# Patient Record
Sex: FEMALE | Race: WHITE | NOT HISPANIC OR LATINO | Employment: OTHER | URBAN - METROPOLITAN AREA
[De-identification: names, ages, dates, MRNs, and addresses within clinical notes are randomized per-mention and may not be internally consistent; named-entity substitution may affect disease eponyms.]

---

## 2020-03-02 ENCOUNTER — OFFICE VISIT (OUTPATIENT)
Dept: DERMATOLOGY | Facility: CLINIC | Age: 65
End: 2020-03-02
Payer: COMMERCIAL

## 2020-03-02 VITALS — WEIGHT: 159.38 LBS | TEMPERATURE: 98 F | BODY MASS INDEX: 29.33 KG/M2 | HEIGHT: 62 IN

## 2020-03-02 DIAGNOSIS — L57.0 ACTINIC KERATOSIS: Primary | ICD-10-CM

## 2020-03-02 DIAGNOSIS — L82.1 SEBORRHEIC KERATOSES: ICD-10-CM

## 2020-03-02 DIAGNOSIS — L29.9 PRURITUS: ICD-10-CM

## 2020-03-02 PROCEDURE — 99204 OFFICE O/P NEW MOD 45 MIN: CPT | Performed by: DERMATOLOGY

## 2020-03-02 PROCEDURE — 17000 DESTRUCT PREMALG LESION: CPT | Performed by: DERMATOLOGY

## 2020-03-02 RX ORDER — PANTOPRAZOLE SODIUM 40 MG/1
40 TABLET, DELAYED RELEASE ORAL DAILY
Status: ON HOLD | COMMUNITY
Start: 2020-02-10 | End: 2020-07-06 | Stop reason: SDUPTHER

## 2020-03-02 RX ORDER — LEVOTHYROXINE SODIUM 0.07 MG/1
75 TABLET ORAL DAILY
COMMUNITY
Start: 2019-11-15

## 2020-03-02 RX ORDER — BETAMETHASONE DIPROPIONATE 0.5 MG/G
CREAM TOPICAL 2 TIMES DAILY
Qty: 30 G | Refills: 2 | Status: SHIPPED | OUTPATIENT
Start: 2020-03-02 | End: 2020-08-17 | Stop reason: HOSPADM

## 2020-03-02 NOTE — PROGRESS NOTES
[facility] Note     Patient Name: Kerry Meyer  Encounter Date: 3/2/20    Today's Chief Concerns:  Yvonne Miller Concern #1:  Full body exam    Past Medical History:  Have you ever had or currently have any of the following medical conditions or treatments? · HIV/AIDS: No  · Hepatitis B: No  · Hepatitis C: No   · Diabetes: No  · Tuberculosis: No  · Biologic Therapy/Chemotherapy: No  · Organ or Bone Marrow Transplantation: No  · Radiation Treatment: No  · Cancer (If Yes, which types)- No      Have you ever had any of the following skin conditions? · Melanoma? (If Yes, please provide more detail)- No  · Basal Cell Carcinoma: No  · Squamous Cell Carcinoma: No  · Sebaceous Cell Carcinoma: No  · Merkel Cell Carcinoma: No  · Angiosarcoma: No  · Blistering Sunburns: YES  · Eczema: No  · Psoriasis: No    Social History:    What is your current Smoking Status? 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 Denies     Family history:  Do any of your "first degree relatives" (parent, brother, sister, or child) have any of the following conditions? · Melanoma? (If Yes, which relatives?) No  · Eczema: No  · Asthma: No  · Hay Fever/Seasonal Allergies: No  · Psoriasis: No  · Arthritis: YES  · Thyroid Problems: YES  · Lupus/Connective Tissue Disease: No  · Diabetes: No  · Stroke: YES  · Blood Clots: YES  · IBD/Crohn's/Ulcerative Colitis: No  · Vitiligo: No  · Scarring/Keloids: No  · Severe Acne: No  · Pancreatic Cancer: No  · Other known Skin Condition? If Yes, what condition and which relatives? No    Current Medications:    Current Outpatient Medications:     levothyroxine 75 mcg tablet, Take 75 mcg by mouth daily, Disp: , Rfl:     pantoprazole (PROTONIX) 40 mg tablet, Take 40 mg by mouth daily, Disp: , Rfl:     Specific Alerts:    Have you been seen by a [facility] Dermatologist in the last 3 years? No    Are you pregnant or planning to become pregnant? No    Are you currently or planning to be nursing or breast feeding? No    Allergies   Allergen Reactions    Sulfa Antibiotics Nausea Only    Medical Tape Rash       May we call your Preferred Phone number to discuss your specific medical information? YES    May we leave a detailed message that includes your specific medical information? YES    Have you traveled outside of the [facility] in the past 3 months? No    Do you currently have a pacemaker or defibrillator? No    Do you have any artificial heart valves, joints, plates, screws, rods, stents, pins, etc? No   - If Yes, were any placed within the last 2 years? Do you require any medications prior to a surgical procedure? No   - If Yes, for which procedure? n/a   - If Yes, what medications to you require? n/a    Are you taking any medications that cause you to bleed more easily ("blood thinners") No    Have you ever experienced a rapid heartbeat with epinephrine? No    Have you ever been treated with "gold" (gold sodium thiomalate) therapy? No    [facility] can help with wrinkles, "laugh lines," facial volume loss, "double chin," "love handles," age spots, and more  Are you interested in learning today about some of the skin enhancement procedures that we offer? (If Yes, please provide more detail) No    Review of Systems:  Have you recently had or currently have any of the following?     · Fever or chills: No  · Night Sweats: No  · Headaches: No  · Weight Gain: No  · Weight Loss: No  · Blurry Vision: No  · Nausea: No  · Vomiting: No  · Diarrhea: No  · Blood in Stool: No  · Abdominal Pain: No  · Itchy Skin: YES  · Painful Joints: No  · Swollen Joints: No  · Muscle Pain: No  · Irregular Mole: No  · Sun Burn: No  · Dry Skin: YES  · Skin Color Changes: No  · Scar or Keloid: No  · Cold Sores/Fever Blisters: YES  · Bacterial Infections/MRSA: No  · Anxiety: No  · Depression: No  · Suicidal or Homicidal Thoughts: No      PHYSICAL EXAM: Was a chaperone (Derm Clinical Assistant) present for the entirety of the Physical Exam? YES    Did the Dermatology Team specifically ask and  the patient on the importance of a Full Skin Exam to be sure that nothing is missed clinically? YES    Did the patient request or accept a Full Skin Exam?  YES    Did the patient specifically refuse to have the areas "under-the-bra" examined by the Dermatologist? No    Did the patient specifically refuse to have the areas "under-the-underwear" examined by the Dermatologist? No      CONSTITUTIONAL:   Vitals:    03/02/20 1253   Temp: 98 °F (36 7 °C)   TempSrc: Tympanic   Weight: 72 3 kg (159 lb 6 oz)   Height: 5' 2" (1 575 m)       PSYCH: Normal mood and affect  EYES: Normal conjunctiva  ENT: Normal lips and oral mucosa  CARDIOVASCULAR: No edema  RESPIRATORY: Normal respirations  HEME/LYMPH/IMMUNO:  No regional lymphadenopathy except as noted below in ASSESSMENT AND PLAN BY DIAGNOSIS    FULL ORGAN SYSTEM SKIN EXAM (SKIN)  Hair, Scalp, Ears, Face Normal except as noted below in Assessment   Neck, Cervical Chain Nodes Normal except as noted below in Assessment   Right Arm/Hand/Fingers Normal except as noted below in Assessment   Left Arm/Hand/Fingers Normal except as noted below in Assessment   Chest/Breasts/Axillae Viewed areas Normal except as noted below in Assessment   Abdomen, Umbilicus Normal except as noted below in Assessment   Back/Spine Normal except as noted below in Assessment   Groin/Genitalia/Buttocks Viewed areas Normal except as noted below in Assessment   Right Leg, Foot, Toes Normal except as noted below in Assessment   Left Leg, Foot, Toes Normal except as noted below in Assessment        ASSESSMENT AND PLAN BY DIAGNOSIS:    History of Present Condition:     Duration:  How long has this been an issue for you?    o  Years    Location Affected:  Where on the body is this affecting you?     o  trunk and extremities    Quality:  Is there any bleeding, pain, itch, burning/irritation, or redness associated with the skin lesion? o  denies    Severity:  Describe any bleeding, pain, itch, burning/irritation, or redness on a scale of 1 to 10 (with 10 being the worst)  o  denies    Timing:  Does this condition seem to be there pretty constantly or do you notice it more at specific times throughout the day?    o  denies    Context:  Have you ever noticed that this condition seems to be associated with specific activities you do?    o  denies    Modifying Factors:    o Anything that seems to make the condition worse?    -  denies   o What have you tried to do to make the condition better?    -  denies    Associated Signs and Symptoms:  Does this skin lesion seem to be associated with any of the following:  o  denies     1  ACTINIC KERATOSIS    Physical Exam:   Anatomic Location Affected:  Right upper lip   Morphological Description:  Red scaly papule     Assessment and Plan:  Based on a thorough discussion of this condition and the management approach to it (including a comprehensive discussion of the known risks, side effects and potential benefits of treatment), the patient (family) agrees to implement the following specific plan:     Reassure benign    Liquid nitrogen was applied for 10-12 seconds to the skin lesion and the expected blistering or scabbing reaction explained  Do not pick at the area  Patient reminded to expect hypopigmented scars from the procedure  Return if lesion fails to fully resolve  Actinic keratoses are very common on sites repeatedly exposed to the sun, especially the backs of the hands and the face, most often affecting the ears, nose, cheeks, upper lip, vermilion of the lower lip, temples, forehead and balding scalp  In severely chronically sun-damaged individuals, they may also be found on the upper trunk, upper and lower limbs, and dorsum of feet      We discussed the theoretical premalignant (pre-cancerous) nature and etiology of these growths  We discussed the prevailing notion that actinic keratoses are a reflection of abnormal skin cell development due to DNA damage by short wavelength UVB  They are more likely to appear if the immune function is poor, due to aging, recent sun exposure, predisposing disease or certain drugs  We discussed that the main concern is that actinic keratoses may predispose to squamous cell carcinoma  It is rare for a solitary actinic keratosis to evolve to squamous cell carcinoma (SCC), but the risk of SCC occurring at some stage in a patient with more than 10 actinic keratoses is thought to be about 10 to 15%  A tender, thickened, ulcerated or enlarging actinic keratosis is suspicious of SCC  Actinic keratoses may be prevented by strict sun protection  If already present, keratoses may improve with a very high sun protection factor (50+) broad-spectrum sunscreen applied at least daily to affected areas, year-round  We recommend that UPF-rated clothing and hats and sunglasses be worn whenever possible and that a sunscreen-moisturizer combination product such as Neutrogena Daily Defense be applied at least three times a day  We performed a thorough discussion of treatment options and specific risk/benefits/alternatives including but not limited to medical field treatment with medications such as the following:      Cryotherapy (specifically, local pain, scarring, dyspigmentation, blistering, possible superinfection, and treats only what we see versus directed treatment today)  PROCEDURE:  DESTRUCTION OF PRE-MALIGNANT LESIONS  After a thorough discussion of treatment options and risk/benefits/alternatives (including but not limited to local pain, scarring, dyspigmentation, blistering, and possible superinfection), verbal and written consent were obtained and the aforementioned lesions were treated on with cryotherapy using liquid nitrogen x 1 cycle for 5-10 seconds       TOTAL NUMBER of 1 pre-malignant lesions were treated today on the ANATOMIC LOCATION: right upper lip  The patient tolerated the procedure well, and after-care instructions were provided  2  PRURITUS    Physical Exam:   Anatomic Location Affected:  Right and left shoulder    Morphological Description:  Crusted erosions    Pertinent Positives:   Pertinent Negatives: no lymphadenopathy     Assessment and Plan:  Based on a thorough discussion of this condition and the management approach to it (including a comprehensive discussion of the known risks, side effects and potential benefits of treatment), the patient (family) agrees to implement the following specific plan:   Claritin or Allegra once daily    Start Betamethasone 0 05% cream, apply topically twice a day    What is pruritus? Pruritus is the medical term for itch  Itch is an unpleasant sensation on the skin that provokes the desire to rub or scratch the area to obtain relief  Itch can cause discomfort and frustration; in severe cases it can lead to disturbed sleep, anxiety and depression  Constant scratching to obtain relief can damage the skin (excoriation, lichenification) and reduce its effectiveness as a major protective barrier  Pruritus is often a symptom of an underlying disease process such as a skin problem, a systemic disease, or abnormal nerve impulses  What are skin signs of pruritus? There are no specific skin signs associated with pruritus, apart from scratch marks (excoriations) and signs of the underlying condition  Persistent scratching over a period of time may lead to:   Lichenification (thickened skin, lichen simplex)    Prurigo papules and nodules  Who gets pruritus? The epidemiology of pruritus depends on its underlying cause or causes  However, in general, the incidence of chronic pruritus increases with age, it is more common in women, and in those of  background       Mechanisms underlying pruritus  Itch, like pain, can originate anywhere along the neural itch pathway, from the central nervous system (brain and spinal cord) to the peripheral nervous system and the skin  Mechanisms underlying pruritus are complex   The itch signal is transmitted mainly through small, itch-selective C-fibers in the skin in addition to histamine-triggered and non-histaminergic neurons   These connect with secondary neurons which cross the opposite side of the spinothalamic tract and ascend to parts of the brain involved in sensation, emotion, reward and memory  These areas overlap with those activated by pain   Patients with chronic pruritus usually have both peripheral and central hypersensitization (heightened reaction) which means they tend to overreact to noxious stimuli which normally inhibit itch (such as heat and scratching) and also misinterpret non-noxious stimuli as an itch (eg, light touch)    The way scratching stops itching has been explained by an interaction with pain pathways within the dorsal horn of the spinal cord  Localized pruritus  Localized pruritus is pruritus that is confined to a certain part of the body  It can occur in association with a primary rash (eg dermatitis) or may occur because of hypersensitive nerves in the skin (neuropathic pruritus)  Neuropathic pruritus is due to compression or degeneration of nerves in the skin, on route to the spine or in the spine itself  Neuropathic itch is sometimes associated with reduced or absent sweating in the affected area of skin    Typical causes of localized itchy rashes   Scalp: seborrhoeic dermatitis, head lice    Back: [place] disease    Hands: pompholyx, irritant and/or allergic contact dermatitis    Genitals: vulvovaginal Candida albicans infection, lichen sclerosus    Legs: venous eczema    Feet: tinea pedis    Neuropathic causes of localized pruritus without primary rash   Face: trigeminal trophic syndrome    Hand: cheiralgia paraesthetica  Arm: brachioradial pruritus    Back: notalgia paraesthetica/topics/brachioradial-pruritus/    Genital: pruritus vulvae, pruritus ani    Dermatomal: herpes zoster (shingles) during recovery phase  Scratching a localised itch may lead to lichen simplex, prurigo or prurigo nodularis  Systemic causes of pruritus  Sytemic diseases may cause generalised pruritus  This is sometimes called metabolic itch  There is nothing wrong with the skin itself, at least until it's been scratched  Metabolic disorders include chronic renal failure (dialysis) and liver disease (with or without cholestasis)  Uraemic pruritus arises in patients undergoing dialysis is due to a combination of xerosis (dry skin), secondary hyperparathyroidism, peripheral neuropathy (nerve changes) and inflammation   Secondary hyperparathyroidism which also occurs in dialysis patients leads to microprecipitation (deposition) of calcium and magnesium salts in the skin, triggering mast cell degeneration, releasing serotonin and histamine   Once chronic pruritus has occurred, there may be secondary changes in the nerves in the skin and central nervous system which heighten the sensation of itch  Hepatogenic pruritus is more common in intrahepatic than extrahepatic cholestasis  Examples of intrahepatic cholestasis is associated with chronic viral hepatitis, primary biliary cirrhosis, pregnancy-related cholestasis  Extra-hepatic cholestasis is associated with pressure on the bile ducts, eg from pancreatic tumours or pseudocysts   Cholestasis is thought to release toxic substances from the liver, which stimulates neural itch fibres in the skin   Characteristically, cholestatic pruritus is most severe at night; it tends to affect the hands, feet and areas where clothes are rubbing on the skin  Haematological disorders include iron deficiency anaemia and polycythaemia vera     Generalized pruritus along with glossitis (tongue inflammation) and angular cheilitis (inflammation of mouth corners) are seen in iron deficiency anaemia   In polycythaemia vera, itch is usually precipitated by contact with water (aquagenic pruritus), eg after a shower  This is thought to be mediated by the effect of platelets, serotonin and prostaglandins  Endocrine disorders include thyroid disease and diabetes mellitus   In Graves' disease (thyrotoxicosis), increased blood flow, skin temperature and decreased itch threshold mediated by the increase in thyroid hormones, lead to the itch  Pruritus associated with myxoedema and hypothyroidism is rare, and if present, is more likely the result of xerosis (dry skin)   In diabetes mellitus, localized itch tends to occur in the perianal/genital region usually due to Candida albicans or dermatophyte infections  It is unclear if metabolic abnormalities such as renal impairment, autonomic failure or diabetic neuropathy contribute to this  Paraneoplastic itch is associated with lymphoma, especially Hodgkin lymphoma, leukemia or a solid organ tumor (eg lung, colon, brain)   In Hodgkin lymphoma, pruritus is thought to be caused by histamine release, which may be related to eosinophilia  Infections causing itch include human immunodeficiency virus infection (HIV) and hepatitis C virus   Patients with HIV commonly complain of itch  This may be associated with skin infections/infestations, dry skin, drug reactions, hyperoesinophilia (increased eosinophil levels) and cutaneous T cell lymphoma  There is a possible correlation between intractable pruritus and increased HIV viral load   In chronic hepatitis C infection, the mechanisms responsible for itch remain unclear  In the absence of cholestasis, pruritus may be related to antiviral therapy; it has been noted to occur in patients treated with combination therapy (interferon lianet and ribavirin)      Pruritic skin diseases  Pruritus is often a symptom of many skin diseases  Some of these are included in the following list    Allergic contact dermatitis    Dry skin    Urticaria    Psoriasis    Atopic dermatitis    Folliculitis    Dermatitis herpetiformis    Lichen simplex    Lichen planus    Bullous pemphigoid    Lice    Scabies    Miliaria    Sunburn    Pityriasis rosea    Mycosis fungoides    Exposure-related pruritus  Pruritus may arise as a result of exposure to certain external factors   Allergens or irritants    Cold, which can cause 'winter itch'    A physical urticaria, such as dermographism    Aquagenic pruritus (itch on exposure to water)    Insects and infestations, eg scabies    Medications (topical or systemic) eg opioids, aspirin    Hormonal reasons for pruritus  About 2% of pregnant women have pruritus without any obvious dermatological cause  In some cases the itch is due to cholestasis (pooling of bile in the gall bladder and liver)  It usually occurs in the 3rd trimester and is relieved after giving birth  Generalized itch is also a common symptom of menopause  How is pruritus diagnosed? The first steps of evaluation of an itchy patient are medical history and examination  A thorough history can identify constitutional symptoms that may point towards an underlying systemic disease  Drug triggers such as opioids may be identified, especially if the commencement of the drug relates to the itch  A careful examination can identify dermatological causes for the itch (eg scabies, lichen simplex, pemphigoid) or evidence of chronic skin changes related to the itch  In dermatological causes of pruritus, primary skin lesions will usually suggest the diagnosis  Patients without primary skin lesions and little evidence of chronic scratching should be investigated for systemic, neuropathic and psychogenic causes    The panel of investigations could include:   Full/complete blood count    Creatinine and renal function tests    Liver function tests    Thyroid function tests    Erythrocyte sedimentation rate    Chest radiography    HIV serology    What treatment is available for itch? The management of pruritus relies on establishing the cause and then either removing or treating the cause to prevent further itching  In many cases, tests are necessary to determine the cause; while these are in progress, treatment to provide symptomatic relief of pruritus may be given  Topical treatments  In addition to specific therapy for any underlying skin or internal disease, topical treatment may include:   Wet dressings or tepid shower to cool the skin    Calamine lotion (contains phenol, which cools the skin): avoid on dry skin and limit use to a few days    Menthol/camphor lotion: gives a chilling sensation    Local anesthetics, such as pramoxine (also called pramocaine), applied to small itchy spots such as insect bites    Regular use of emollients, especially if skin is dry    Mild topical corticosteroids for short periods    Topical calcineurin inhibitors are also used to reduce itch associated with inflammatory skin conditions    Topical doxepin, a tricyclic antidepressant and antihistamine, is an antipruritic used in eczema  Other measures that can be useful in preventing pruritus include avoiding precipitating factors such as rough clothing or fabrics, overheating, and vasodilators if they provoke itching (eg, caffeine, alcohol, spices)  Fingernails should be kept short and clean  If the urge to scratch is irresistible then rub the area with your palm  Topical antihistamines should not be used for chronic itch, as they may sensitize the skin and result in allergic contact dermatitis  Systemic therapy  If pruritus is severe and sleep is disturbed, then treatment with oral medication may be necessary  Some drugs may help to relieve the itch whilst others are given solely for their sedative effects     Antihistamines are most useful in urticaria, in which histamine is released  Use for other pruritic conditions is not supported by randomized control trials  Sedating antihistamines may be used for their sedative effects   Doxepin and amitriptyline are tricyclic antidepressants have antipruritic action and act on the central and peripheral nervous systems   Tetracyclic antidepressants such as mirtazepine and selective serotonin reuptake inhibitors (paroxetine, sertraline, fluoxetine) may also help some patients with severe itch including when it is caused by cholestasis, T-cell lymphoma, malignancy or a neuropathic condition   Anti-epileptic drugs such as sodium valproate, gabapentin and pregabalin may also be of benefit to some patients, e g , those with itch associated with renal failure or neuropathic itch  The mechanism of action is uncertain   Opioid antagonists such as butorphanol intranasal spray, naltrexone tablets, and naloxone have been effective in patients suffering from intractable pruritus in association with liver disease, atopic eczema and chronic urticaria  Nalfurafine, which is a kappa-opioid agonist has also been studied and shown to reduce itch associated with chronic renal impairment, however it is not widely available   Aspirin is sometimes effective if pruritus is mediated by kinins or prostaglandins and is noted to be effective in patients with pruritus due to polycythaemia vera  Note: aspirin may cause or aggravate itch in some patients   Thalidomide has been successful in treating nodular prurigo and chronic pruritus of various kinds but is rarely used because of serious adverse effects and expense   Rifampicin is effective for patients with pruritus associated with cholestasis (some forms of liver disease)       Isolated case reports in severe itch associated with malignancy have reported success with the NKR1 antagonist, aprepitant (normally used short-term for postoperative or chemotherapy-induced nausea)  This is under investigation for neuropathic itch and nodular prurigo  Phototherapy  Broadband ultraviolet B or narrow-band UVB phototherapy alone, or in conjunction with UVA, has been shown to be helpful for pruritus associated with chronic kidney disease, psoriasis, atopic eczema and cutaneous T-cell lymphoma  Behavioral therapy  Behavioral therapy may be used in conjunction with pharmacotherapy to modify behaviours such as coping mechanisms and stress reduction, which help interrupt the itch-scratch cycle  One randomized controlled trial showed short-term benefits in reduction in itch frequency and scratching as well as improvement in coping mechanisms  What is the outcome for pruritus? The management of chronic severe itch is difficult and often requires the use of combination therapy over a long period of time  Identification and treatment of underlying conditions causing pruritus may help in this process  The symptom may quickly disappear or persist for long periods of time  3  SEBORRHEIC KERATOSIS; NON-INFLAMED    Physical Exam:   Anatomic Location Affected:  Right nipple    Morphological Description:  scaly plaque   Pertinent Positives:   Pertinent Negatives: no lymphadenopathy     Additional History of Present Condition:  Patient reports new bumps on the skin  Denies itch, burn, pain, bleeding or ulceration  Present constantly; nothing seems to make it worse or better  No prior treatment  Assessment and Plan:  Based on a thorough discussion of this condition and the management approach to it (including a comprehensive discussion of the known risks, side effects and potential benefits of treatment), the patient (family) agrees to implement the following specific plan:   Reassure benign     Seborrheic Keratosis  A seborrheic keratosis is a harmless warty spot that appears during adult life as a common sign of skin aging    Seborrheic keratoses can arise on any area of skin, covered or uncovered, with the usual exception of the palms and soles  They do not arise from mucous membranes  Seborrheic keratoses can have highly variable appearance  Seborrheic keratoses are extremely common  It has been estimated that over 90% of adults over the age of 61 years have one or more of them  They occur in males and females of all races, typically beginning to erupt in the 35s or 45s  They are uncommon under the age of 21 years  The precise cause of seborrhoeic keratoses is not known  Seborrhoeic keratoses are considered degenerative in nature  As time goes by, seborrheic keratoses tend to become more numerous  Some people inherit a tendency to develop a very large number of them; some people may have hundreds of them  The name "seborrheic keratosis" is misleading, because these lesions are not limited to a seborrhoeic distribution (scalp, mid-face, chest, upper back), nor are they formed from sebaceous glands, nor are they associated with sebum -- which is greasy  Seborrheic keratosis may also be called "SK," "Seb K," "basal cell papilloma," "senile wart," or "barnacle "      Researchers have noted:   Eruptive seborrhoeic keratoses can follow sunburn or dermatitis   Skin friction may be the reason they appear in body folds   Viral cause (e g , human papillomavirus) seems unlikely   Stable and clonal mutations or activation of FRFR3, PIK3CA, ABBY, AKT1 and EGFR genes are found in seborrhoeic keratoses   Seborrhoeic keratosis can arise from solar lentigo   FRFR3 mutations also arise in solar lentigines  These mutations are associated with increased age and location on the head and neck, suggesting a role of ultraviolet radiation in these lesions   Seborrheic keratoses do not harbour tumour suppressor gene mutations   Epidermal growth factor receptor inhibitors, which are used to treat some cancers, often result in an increase in verrucal (warty) keratoses      There is no easy way to remove multiple lesions on a single occasion  Unless a specific lesion is "inflamed" and is causing pain or stinging/burning or is bleeding, most insurance companies do not authorize treatment  Physician synopsis:    Pt with fam hx of SCC (daughter) and  1  AK L upper lip tx with LN2 today  2  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

## 2020-03-02 NOTE — PATIENT INSTRUCTIONS
Assessment and Plan:  Based on a thorough discussion of this condition and the management approach to it (including a comprehensive discussion of the known risks, side effects and potential benefits of treatment), the patient (family) agrees to implement the following specific plan:     Reassure benign    Liquid nitrogen was applied for 10-12 seconds to the skin lesion and the expected blistering or scabbing reaction explained  Do not pick at the area  Patient reminded to expect hypopigmented scars from the procedure  Return if lesion fails to fully resolve  Actinic keratoses are very common on sites repeatedly exposed to the sun, especially the backs of the hands and the face, most often affecting the ears, nose, cheeks, upper lip, vermilion of the lower lip, temples, forehead and balding scalp  In severely chronically sun-damaged individuals, they may also be found on the upper trunk, upper and lower limbs, and dorsum of feet  We discussed the theoretical premalignant (pre-cancerous) nature and etiology of these growths  We discussed the prevailing notion that actinic keratoses are a reflection of abnormal skin cell development due to DNA damage by short wavelength UVB  They are more likely to appear if the immune function is poor, due to aging, recent sun exposure, predisposing disease or certain drugs  We discussed that the main concern is that actinic keratoses may predispose to squamous cell carcinoma  It is rare for a solitary actinic keratosis to evolve to squamous cell carcinoma (SCC), but the risk of SCC occurring at some stage in a patient with more than 10 actinic keratoses is thought to be about 10 to 15%  A tender, thickened, ulcerated or enlarging actinic keratosis is suspicious of SCC  Actinic keratoses may be prevented by strict sun protection   If already present, keratoses may improve with a very high sun protection factor (50+) broad-spectrum sunscreen applied at least daily to affected areas, year-round  We recommend that UPF-rated clothing and hats and sunglasses be worn whenever possible and that a sunscreen-moisturizer combination product such as Neutrogena Daily Defense be applied at least three times a day  We performed a thorough discussion of treatment options and specific risk/benefits/alternatives including but not limited to medical field treatment with medications such as the following:      Cryotherapy (specifically, local pain, scarring, dyspigmentation, blistering, possible superinfection, and treats only what we see versus directed treatment today)  Assessment and Plan:  Based on a thorough discussion of this condition and the management approach to it (including a comprehensive discussion of the known risks, side effects and potential benefits of treatment), the patient (family) agrees to implement the following specific plan:   Claritin or Allegra once daily    Start Betamethasone 0 05% cream, apply topically twice a day    What is pruritus? Pruritus is the medical term for itch  Itch is an unpleasant sensation on the skin that provokes the desire to rub or scratch the area to obtain relief  Itch can cause discomfort and frustration; in severe cases it can lead to disturbed sleep, anxiety and depression  Constant scratching to obtain relief can damage the skin (excoriation, lichenification) and reduce its effectiveness as a major protective barrier  Pruritus is often a symptom of an underlying disease process such as a skin problem, a systemic disease, or abnormal nerve impulses  What are skin signs of pruritus? There are no specific skin signs associated with pruritus, apart from scratch marks (excoriations) and signs of the underlying condition  Persistent scratching over a period of time may lead to:   Lichenification (thickened skin, lichen simplex)    Prurigo papules and nodules  Who gets pruritus?   The epidemiology of pruritus depends on its underlying cause or causes  However, in general, the incidence of chronic pruritus increases with age, it is more common in women, and in those of  background  Mechanisms underlying pruritus  Itch, like pain, can originate anywhere along the neural itch pathway, from the central nervous system (brain and spinal cord) to the peripheral nervous system and the skin  Mechanisms underlying pruritus are complex   The itch signal is transmitted mainly through small, itch-selective C-fibers in the skin in addition to histamine-triggered and non-histaminergic neurons   These connect with secondary neurons which cross the opposite side of the spinothalamic tract and ascend to parts of the brain involved in sensation, emotion, reward and memory  These areas overlap with those activated by pain   Patients with chronic pruritus usually have both peripheral and central hypersensitization (heightened reaction) which means they tend to overreact to noxious stimuli which normally inhibit itch (such as heat and scratching) and also misinterpret non-noxious stimuli as an itch (eg, light touch)    The way scratching stops itching has been explained by an interaction with pain pathways within the dorsal horn of the spinal cord  Localized pruritus  Localized pruritus is pruritus that is confined to a certain part of the body  It can occur in association with a primary rash (eg dermatitis) or may occur because of hypersensitive nerves in the skin (neuropathic pruritus)  Neuropathic pruritus is due to compression or degeneration of nerves in the skin, on route to the spine or in the spine itself  Neuropathic itch is sometimes associated with reduced or absent sweating in the affected area of skin    Typical causes of localized itchy rashes   Scalp: seborrhoeic dermatitis, head lice    Back: Zachary disease    Hands: pompholyx, irritant and/or allergic contact dermatitis    Genitals: vulvovaginal Candida albicans infection, lichen sclerosus    Legs: venous eczema    Feet: tinea pedis    Neuropathic causes of localized pruritus without primary rash   Face: trigeminal trophic syndrome    Hand: cheiralgia paraesthetica    Arm: brachioradial pruritus    Back: notalgia paraesthetica/topics/brachioradial-pruritus/    Genital: pruritus vulvae, pruritus ani    Dermatomal: herpes zoster (shingles) during recovery phase  Scratching a localised itch may lead to lichen simplex, prurigo or prurigo nodularis  Systemic causes of pruritus  Sytemic diseases may cause generalised pruritus  This is sometimes called metabolic itch  There is nothing wrong with the skin itself, at least until it's been scratched  Metabolic disorders include chronic renal failure (dialysis) and liver disease (with or without cholestasis)  Uraemic pruritus arises in patients undergoing dialysis is due to a combination of xerosis (dry skin), secondary hyperparathyroidism, peripheral neuropathy (nerve changes) and inflammation   Secondary hyperparathyroidism which also occurs in dialysis patients leads to microprecipitation (deposition) of calcium and magnesium salts in the skin, triggering mast cell degeneration, releasing serotonin and histamine   Once chronic pruritus has occurred, there may be secondary changes in the nerves in the skin and central nervous system which heighten the sensation of itch  Hepatogenic pruritus is more common in intrahepatic than extrahepatic cholestasis  Examples of intrahepatic cholestasis is associated with chronic viral hepatitis, primary biliary cirrhosis, pregnancy-related cholestasis  Extra-hepatic cholestasis is associated with pressure on the bile ducts, eg from pancreatic tumours or pseudocysts   Cholestasis is thought to release toxic substances from the liver, which stimulates neural itch fibres in the skin      Characteristically, cholestatic pruritus is most severe at night; it tends to affect the hands, feet and areas where clothes are rubbing on the skin  Haematological disorders include iron deficiency anaemia and polycythaemia vera   Generalized pruritus along with glossitis (tongue inflammation) and angular cheilitis (inflammation of mouth corners) are seen in iron deficiency anaemia   In polycythaemia vera, itch is usually precipitated by contact with water (aquagenic pruritus), eg after a shower  This is thought to be mediated by the effect of platelets, serotonin and prostaglandins  Endocrine disorders include thyroid disease and diabetes mellitus   In Graves' disease (thyrotoxicosis), increased blood flow, skin temperature and decreased itch threshold mediated by the increase in thyroid hormones, lead to the itch  Pruritus associated with myxoedema and hypothyroidism is rare, and if present, is more likely the result of xerosis (dry skin)   In diabetes mellitus, localized itch tends to occur in the perianal/genital region usually due to Candida albicans or dermatophyte infections  It is unclear if metabolic abnormalities such as renal impairment, autonomic failure or diabetic neuropathy contribute to this  Paraneoplastic itch is associated with lymphoma, especially Hodgkin lymphoma, leukemia or a solid organ tumor (eg lung, colon, brain)   In Hodgkin lymphoma, pruritus is thought to be caused by histamine release, which may be related to eosinophilia  Infections causing itch include human immunodeficiency virus infection (HIV) and hepatitis C virus   Patients with HIV commonly complain of itch  This may be associated with skin infections/infestations, dry skin, drug reactions, hyperoesinophilia (increased eosinophil levels) and cutaneous T cell lymphoma  There is a possible correlation between intractable pruritus and increased HIV viral load   In chronic hepatitis C infection, the mechanisms responsible for itch remain unclear   In the absence of cholestasis, pruritus may be related to antiviral therapy; it has been noted to occur in patients treated with combination therapy (interferon lianet and ribavirin)  Pruritic skin diseases  Pruritus is often a symptom of many skin diseases  Some of these are included in the following list    Allergic contact dermatitis    Dry skin    Urticaria    Psoriasis    Atopic dermatitis    Folliculitis    Dermatitis herpetiformis    Lichen simplex    Lichen planus    Bullous pemphigoid    Lice    Scabies    Miliaria    Sunburn    Pityriasis rosea    Mycosis fungoides    Exposure-related pruritus  Pruritus may arise as a result of exposure to certain external factors   Allergens or irritants    Cold, which can cause 'winter itch'    A physical urticaria, such as dermographism    Aquagenic pruritus (itch on exposure to water)    Insects and infestations, eg scabies    Medications (topical or systemic) eg opioids, aspirin    Hormonal reasons for pruritus  About 2% of pregnant women have pruritus without any obvious dermatological cause  In some cases the itch is due to cholestasis (pooling of bile in the gall bladder and liver)  It usually occurs in the 3rd trimester and is relieved after giving birth  Generalized itch is also a common symptom of menopause  How is pruritus diagnosed? The first steps of evaluation of an itchy patient are medical history and examination  A thorough history can identify constitutional symptoms that may point towards an underlying systemic disease  Drug triggers such as opioids may be identified, especially if the commencement of the drug relates to the itch  A careful examination can identify dermatological causes for the itch (eg scabies, lichen simplex, pemphigoid) or evidence of chronic skin changes related to the itch  In dermatological causes of pruritus, primary skin lesions will usually suggest the diagnosis   Patients without primary skin lesions and little evidence of chronic scratching should be investigated for systemic, neuropathic and psychogenic causes  The panel of investigations could include:   Full/complete blood count    Creatinine and renal function tests    Liver function tests    Thyroid function tests    Erythrocyte sedimentation rate    Chest radiography    HIV serology    What treatment is available for itch? The management of pruritus relies on establishing the cause and then either removing or treating the cause to prevent further itching  In many cases, tests are necessary to determine the cause; while these are in progress, treatment to provide symptomatic relief of pruritus may be given  Topical treatments  In addition to specific therapy for any underlying skin or internal disease, topical treatment may include:   Wet dressings or tepid shower to cool the skin    Calamine lotion (contains phenol, which cools the skin): avoid on dry skin and limit use to a few days    Menthol/camphor lotion: gives a chilling sensation    Local anesthetics, such as pramoxine (also called pramocaine), applied to small itchy spots such as insect bites    Regular use of emollients, especially if skin is dry    Mild topical corticosteroids for short periods    Topical calcineurin inhibitors are also used to reduce itch associated with inflammatory skin conditions    Topical doxepin, a tricyclic antidepressant and antihistamine, is an antipruritic used in eczema  Other measures that can be useful in preventing pruritus include avoiding precipitating factors such as rough clothing or fabrics, overheating, and vasodilators if they provoke itching (eg, caffeine, alcohol, spices)  Fingernails should be kept short and clean  If the urge to scratch is irresistible then rub the area with your palm  Topical antihistamines should not be used for chronic itch, as they may sensitize the skin and result in allergic contact dermatitis      Systemic therapy  If pruritus is severe and sleep is disturbed, then treatment with oral medication may be necessary  Some drugs may help to relieve the itch whilst others are given solely for their sedative effects   Antihistamines are most useful in urticaria, in which histamine is released  Use for other pruritic conditions is not supported by randomized control trials  Sedating antihistamines may be used for their sedative effects   Doxepin and amitriptyline are tricyclic antidepressants have antipruritic action and act on the central and peripheral nervous systems   Tetracyclic antidepressants such as mirtazepine and selective serotonin reuptake inhibitors (paroxetine, sertraline, fluoxetine) may also help some patients with severe itch including when it is caused by cholestasis, T-cell lymphoma, malignancy or a neuropathic condition   Anti-epileptic drugs such as sodium valproate, gabapentin and pregabalin may also be of benefit to some patients, e g , those with itch associated with renal failure or neuropathic itch  The mechanism of action is uncertain   Opioid antagonists such as butorphanol intranasal spray, naltrexone tablets, and naloxone have been effective in patients suffering from intractable pruritus in association with liver disease, atopic eczema and chronic urticaria  Nalfurafine, which is a kappa-opioid agonist has also been studied and shown to reduce itch associated with chronic renal impairment, however it is not widely available   Aspirin is sometimes effective if pruritus is mediated by kinins or prostaglandins and is noted to be effective in patients with pruritus due to polycythaemia vera  Note: aspirin may cause or aggravate itch in some patients   Thalidomide has been successful in treating nodular prurigo and chronic pruritus of various kinds but is rarely used because of serious adverse effects and expense      Rifampicin is effective for patients with pruritus associated with cholestasis (some forms of liver disease)  Isolated case reports in severe itch associated with malignancy have reported success with the NKR1 antagonist, aprepitant (normally used short-term for postoperative or chemotherapy-induced nausea)  This is under investigation for neuropathic itch and nodular prurigo  Phototherapy  Broadband ultraviolet B or narrow-band UVB phototherapy alone, or in conjunction with UVA, has been shown to be helpful for pruritus associated with chronic kidney disease, psoriasis, atopic eczema and cutaneous T-cell lymphoma  Behavioral therapy  Behavioral therapy may be used in conjunction with pharmacotherapy to modify behaviours such as coping mechanisms and stress reduction, which help interrupt the itch-scratch cycle  One randomized controlled trial showed short-term benefits in reduction in itch frequency and scratching as well as improvement in coping mechanisms  What is the outcome for pruritus? The management of chronic severe itch is difficult and often requires the use of combination therapy over a long period of time  Identification and treatment of underlying conditions causing pruritus may help in this process  The symptom may quickly disappear or persist for long periods of time  Assessment and Plan:  Based on a thorough discussion of this condition and the management approach to it (including a comprehensive discussion of the known risks, side effects and potential benefits of treatment), the patient (family) agrees to implement the following specific plan:   Reassure benign     Seborrheic Keratosis  A seborrheic keratosis is a harmless warty spot that appears during adult life as a common sign of skin aging  Seborrheic keratoses can arise on any area of skin, covered or uncovered, with the usual exception of the palms and soles  They do not arise from mucous membranes  Seborrheic keratoses can have highly variable appearance  Seborrheic keratoses are extremely common   It has been estimated that over 90% of adults over the age of 61 years have one or more of them  They occur in males and females of all races, typically beginning to erupt in the 35s or 45s  They are uncommon under the age of 21 years  The precise cause of seborrhoeic keratoses is not known  Seborrhoeic keratoses are considered degenerative in nature  As time goes by, seborrheic keratoses tend to become more numerous  Some people inherit a tendency to develop a very large number of them; some people may have hundreds of them  The name "seborrheic keratosis" is misleading, because these lesions are not limited to a seborrhoeic distribution (scalp, mid-face, chest, upper back), nor are they formed from sebaceous glands, nor are they associated with sebum -- which is greasy  Seborrheic keratosis may also be called "SK," "Seb K," "basal cell papilloma," "senile wart," or "barnacle "      Researchers have noted:   Eruptive seborrhoeic keratoses can follow sunburn or dermatitis   Skin friction may be the reason they appear in body folds   Viral cause (e g , human papillomavirus) seems unlikely   Stable and clonal mutations or activation of FRFR3, PIK3CA, ABBY, AKT1 and EGFR genes are found in seborrhoeic keratoses   Seborrhoeic keratosis can arise from solar lentigo   FRFR3 mutations also arise in solar lentigines  These mutations are associated with increased age and location on the head and neck, suggesting a role of ultraviolet radiation in these lesions   Seborrheic keratoses do not harbour tumour suppressor gene mutations   Epidermal growth factor receptor inhibitors, which are used to treat some cancers, often result in an increase in verrucal (warty) keratoses  There is no easy way to remove multiple lesions on a single occasion  Unless a specific lesion is "inflamed" and is causing pain or stinging/burning or is bleeding, most insurance companies do not authorize treatment

## 2020-03-06 ENCOUNTER — TELEPHONE (OUTPATIENT)
Dept: DERMATOLOGY | Facility: CLINIC | Age: 65
End: 2020-03-06

## 2020-07-05 ENCOUNTER — APPOINTMENT (EMERGENCY)
Dept: RADIOLOGY | Facility: HOSPITAL | Age: 65
End: 2020-07-05
Payer: COMMERCIAL

## 2020-07-05 ENCOUNTER — HOSPITAL ENCOUNTER (OUTPATIENT)
Facility: HOSPITAL | Age: 65
Setting detail: OBSERVATION
Discharge: HOME | End: 2020-07-06
Attending: EMERGENCY MEDICINE | Admitting: INTERNAL MEDICINE
Payer: COMMERCIAL

## 2020-07-05 ENCOUNTER — APPOINTMENT (EMERGENCY)
Dept: CT IMAGING | Facility: HOSPITAL | Age: 65
End: 2020-07-05
Payer: COMMERCIAL

## 2020-07-05 DIAGNOSIS — K21.9 GASTROESOPHAGEAL REFLUX DISEASE WITHOUT ESOPHAGITIS: ICD-10-CM

## 2020-07-05 DIAGNOSIS — R42 DIZZINESS: ICD-10-CM

## 2020-07-05 DIAGNOSIS — I16.0 HYPERTENSIVE URGENCY: ICD-10-CM

## 2020-07-05 DIAGNOSIS — R10.13 EPIGASTRIC PAIN DETERMINED BY EXAMINATION: ICD-10-CM

## 2020-07-05 DIAGNOSIS — R07.9 CHEST PAIN: Primary | ICD-10-CM

## 2020-07-05 LAB
ALBUMIN SERPL BCP-MCNC: 4.3 G/DL (ref 3.5–5)
ALP SERPL-CCNC: 59 U/L (ref 46–116)
ALT SERPL W P-5'-P-CCNC: 23 U/L (ref 12–78)
ANION GAP SERPL CALCULATED.3IONS-SCNC: 8 MMOL/L (ref 4–13)
AST SERPL W P-5'-P-CCNC: 15 U/L (ref 5–45)
BACTERIA UR QL AUTO: ABNORMAL /HPF
BASOPHILS # BLD AUTO: 0.04 THOUSANDS/ΜL (ref 0–0.1)
BASOPHILS NFR BLD AUTO: 1 % (ref 0–1)
BILIRUB SERPL-MCNC: 0.81 MG/DL (ref 0.2–1)
BILIRUB UR QL STRIP: NEGATIVE
BUN SERPL-MCNC: 13 MG/DL (ref 5–25)
CALCIUM SERPL-MCNC: 9.3 MG/DL (ref 8.3–10.1)
CHLORIDE SERPL-SCNC: 102 MMOL/L (ref 100–108)
CLARITY UR: CLEAR
CO2 SERPL-SCNC: 28 MMOL/L (ref 21–32)
COLOR UR: YELLOW
CREAT SERPL-MCNC: 0.97 MG/DL (ref 0.6–1.3)
EOSINOPHIL # BLD AUTO: 0.04 THOUSAND/ΜL (ref 0–0.61)
EOSINOPHIL NFR BLD AUTO: 1 % (ref 0–6)
ERYTHROCYTE [DISTWIDTH] IN BLOOD BY AUTOMATED COUNT: 12.6 % (ref 11.6–15.1)
GFR SERPL CREATININE-BSD FRML MDRD: 61 ML/MIN/1.73SQ M
GLUCOSE SERPL-MCNC: 128 MG/DL (ref 65–140)
GLUCOSE UR STRIP-MCNC: NEGATIVE MG/DL
HCT VFR BLD AUTO: 48.9 % (ref 34.8–46.1)
HGB BLD-MCNC: 16 G/DL (ref 11.5–15.4)
HGB UR QL STRIP.AUTO: NEGATIVE
IMM GRANULOCYTES # BLD AUTO: 0.03 THOUSAND/UL (ref 0–0.2)
IMM GRANULOCYTES NFR BLD AUTO: 0 % (ref 0–2)
KETONES UR STRIP-MCNC: NEGATIVE MG/DL
LEUKOCYTE ESTERASE UR QL STRIP: ABNORMAL
LYMPHOCYTES # BLD AUTO: 2.51 THOUSANDS/ΜL (ref 0.6–4.47)
LYMPHOCYTES NFR BLD AUTO: 34 % (ref 14–44)
MAGNESIUM SERPL-MCNC: 2.1 MG/DL (ref 1.6–2.6)
MCH RBC QN AUTO: 31.3 PG (ref 26.8–34.3)
MCHC RBC AUTO-ENTMCNC: 32.7 G/DL (ref 31.4–37.4)
MCV RBC AUTO: 96 FL (ref 82–98)
MONOCYTES # BLD AUTO: 0.33 THOUSAND/ΜL (ref 0.17–1.22)
MONOCYTES NFR BLD AUTO: 5 % (ref 4–12)
NEUTROPHILS # BLD AUTO: 4.41 THOUSANDS/ΜL (ref 1.85–7.62)
NEUTS SEG NFR BLD AUTO: 59 % (ref 43–75)
NITRITE UR QL STRIP: NEGATIVE
NON-SQ EPI CELLS URNS QL MICRO: ABNORMAL /HPF
NRBC BLD AUTO-RTO: 0 /100 WBCS
NT-PROBNP SERPL-MCNC: 123 PG/ML
PH UR STRIP.AUTO: 7 [PH] (ref 4.5–8)
PLATELET # BLD AUTO: 224 THOUSANDS/UL (ref 149–390)
PLATELET # BLD AUTO: 236 THOUSANDS/UL (ref 149–390)
PMV BLD AUTO: 9.7 FL (ref 8.9–12.7)
PMV BLD AUTO: 9.8 FL (ref 8.9–12.7)
POTASSIUM SERPL-SCNC: 3.7 MMOL/L (ref 3.5–5.3)
PROT SERPL-MCNC: 8.1 G/DL (ref 6.4–8.2)
PROT UR STRIP-MCNC: NEGATIVE MG/DL
RBC # BLD AUTO: 5.12 MILLION/UL (ref 3.81–5.12)
RBC #/AREA URNS AUTO: ABNORMAL /HPF
SARS-COV-2 RNA RESP QL NAA+PROBE: NEGATIVE
SODIUM SERPL-SCNC: 138 MMOL/L (ref 136–145)
SP GR UR STRIP.AUTO: 1.01 (ref 1–1.03)
TROPONIN I SERPL-MCNC: <0.02 NG/ML
TROPONIN I SERPL-MCNC: <0.02 NG/ML
TSH SERPL DL<=0.05 MIU/L-ACNC: 1.19 UIU/ML (ref 0.36–3.74)
UROBILINOGEN UR QL STRIP.AUTO: 0.2 E.U./DL
WBC # BLD AUTO: 7.36 THOUSAND/UL (ref 4.31–10.16)
WBC #/AREA URNS AUTO: ABNORMAL /HPF

## 2020-07-05 PROCEDURE — 99220 PR INITIAL OBSERVATION CARE/DAY 70 MINUTES: CPT | Performed by: PHYSICIAN ASSISTANT

## 2020-07-05 PROCEDURE — 81001 URINALYSIS AUTO W/SCOPE: CPT

## 2020-07-05 PROCEDURE — 87635 SARS-COV-2 COVID-19 AMP PRB: CPT | Performed by: EMERGENCY MEDICINE

## 2020-07-05 PROCEDURE — 74174 CTA ABD&PLVS W/CONTRAST: CPT

## 2020-07-05 PROCEDURE — 83880 ASSAY OF NATRIURETIC PEPTIDE: CPT | Performed by: EMERGENCY MEDICINE

## 2020-07-05 PROCEDURE — 71045 X-RAY EXAM CHEST 1 VIEW: CPT

## 2020-07-05 PROCEDURE — 85049 AUTOMATED PLATELET COUNT: CPT | Performed by: PHYSICIAN ASSISTANT

## 2020-07-05 PROCEDURE — 71275 CT ANGIOGRAPHY CHEST: CPT

## 2020-07-05 PROCEDURE — 36415 COLL VENOUS BLD VENIPUNCTURE: CPT | Performed by: EMERGENCY MEDICINE

## 2020-07-05 PROCEDURE — 99285 EMERGENCY DEPT VISIT HI MDM: CPT

## 2020-07-05 PROCEDURE — 84484 ASSAY OF TROPONIN QUANT: CPT | Performed by: PHYSICIAN ASSISTANT

## 2020-07-05 PROCEDURE — 93005 ELECTROCARDIOGRAM TRACING: CPT

## 2020-07-05 PROCEDURE — 83036 HEMOGLOBIN GLYCOSYLATED A1C: CPT | Performed by: PHYSICIAN ASSISTANT

## 2020-07-05 PROCEDURE — 85025 COMPLETE CBC W/AUTO DIFF WBC: CPT | Performed by: EMERGENCY MEDICINE

## 2020-07-05 PROCEDURE — 83735 ASSAY OF MAGNESIUM: CPT | Performed by: EMERGENCY MEDICINE

## 2020-07-05 PROCEDURE — 96375 TX/PRO/DX INJ NEW DRUG ADDON: CPT

## 2020-07-05 PROCEDURE — 84443 ASSAY THYROID STIM HORMONE: CPT | Performed by: PHYSICIAN ASSISTANT

## 2020-07-05 PROCEDURE — 84484 ASSAY OF TROPONIN QUANT: CPT | Performed by: EMERGENCY MEDICINE

## 2020-07-05 PROCEDURE — 99285 EMERGENCY DEPT VISIT HI MDM: CPT | Performed by: EMERGENCY MEDICINE

## 2020-07-05 PROCEDURE — 80053 COMPREHEN METABOLIC PANEL: CPT | Performed by: EMERGENCY MEDICINE

## 2020-07-05 PROCEDURE — 96374 THER/PROPH/DIAG INJ IV PUSH: CPT

## 2020-07-05 RX ORDER — MECLIZINE HYDROCHLORIDE 25 MG/1
25 TABLET ORAL EVERY 8 HOURS PRN
Status: DISCONTINUED | OUTPATIENT
Start: 2020-07-05 | End: 2020-07-06 | Stop reason: HOSPADM

## 2020-07-05 RX ORDER — MAGNESIUM HYDROXIDE/ALUMINUM HYDROXICE/SIMETHICONE 120; 1200; 1200 MG/30ML; MG/30ML; MG/30ML
30 SUSPENSION ORAL EVERY 4 HOURS PRN
Status: DISCONTINUED | OUTPATIENT
Start: 2020-07-05 | End: 2020-07-06 | Stop reason: HOSPADM

## 2020-07-05 RX ORDER — LEVOTHYROXINE SODIUM 0.07 MG/1
75 TABLET ORAL
Status: DISCONTINUED | OUTPATIENT
Start: 2020-07-06 | End: 2020-07-06 | Stop reason: HOSPADM

## 2020-07-05 RX ORDER — LABETALOL 20 MG/4 ML (5 MG/ML) INTRAVENOUS SYRINGE
10 ONCE
Status: COMPLETED | OUTPATIENT
Start: 2020-07-05 | End: 2020-07-05

## 2020-07-05 RX ORDER — PANTOPRAZOLE SODIUM 40 MG/1
40 TABLET, DELAYED RELEASE ORAL
Status: DISCONTINUED | OUTPATIENT
Start: 2020-07-06 | End: 2020-07-06

## 2020-07-05 RX ORDER — ONDANSETRON 2 MG/ML
4 INJECTION INTRAMUSCULAR; INTRAVENOUS ONCE
Status: COMPLETED | OUTPATIENT
Start: 2020-07-05 | End: 2020-07-05

## 2020-07-05 RX ORDER — ACETAMINOPHEN 325 MG/1
650 TABLET ORAL EVERY 6 HOURS PRN
Status: DISCONTINUED | OUTPATIENT
Start: 2020-07-05 | End: 2020-07-06 | Stop reason: HOSPADM

## 2020-07-05 RX ADMIN — LABETALOL 20 MG/4 ML (5 MG/ML) INTRAVENOUS SYRINGE 10 MG: at 19:10

## 2020-07-05 RX ADMIN — IOHEXOL 100 ML: 350 INJECTION, SOLUTION INTRAVENOUS at 19:45

## 2020-07-05 RX ADMIN — ONDANSETRON 4 MG: 2 INJECTION INTRAMUSCULAR; INTRAVENOUS at 18:59

## 2020-07-05 NOTE — ED PROVIDER NOTES
History  Chief Complaint   Patient presents with    Shortness of Breath     leg weakness walking up driveway yesterday; woke up with burning cp today; nausea     77-year-old female comes in for evaluation of chest pain  Patient states she got weak and short of breath walking from her house to her mailbox yesterday this is typically not a problem for her  Today she began to feel weak and lightheaded and began to have some chest pain on the left side then pain in between her shoulder blades and now has plain in her abdomen  She does have a history of acid reflux  History provided by:  Patient   used: No    Shortness of Breath   Severity:  Moderate  Onset quality:  Sudden  Duration:  1 day  Timing:  Constant  Progression:  Worsening  Chronicity:  New  Context: activity    Relieved by:  Lying down  Associated symptoms: abdominal pain and chest pain    Associated symptoms: no cough, no ear pain, no fever, no headaches, no rash and no wheezing    Abdominal pain:     Location:  Generalized    Quality: gnawing      Severity:  Moderate    Onset quality:  Gradual    Duration:  1 day    Timing:  Constant    Progression:  Worsening  Chest pain:     Quality: pressure      Severity:  Moderate    Onset quality:  Gradual    Duration:  12 hours    Timing:  Constant    Progression:  Waxing and waning    Chronicity:  New  Risk factors: no recent alcohol use, no oral contraceptive use and no prolonged immobilization        Prior to Admission Medications   Prescriptions Last Dose Informant Patient Reported? Taking?    betamethasone, augmented, (DIPROLENE-AF) 0 05 % cream   No No   Sig: Apply topically 2 (two) times a day   levothyroxine 75 mcg tablet   Yes No   Sig: Take 75 mcg by mouth daily   pantoprazole (PROTONIX) 40 mg tablet   Yes No   Sig: Take 40 mg by mouth daily      Facility-Administered Medications: None       Past Medical History:   Diagnosis Date    Disease of thyroid gland        Past Surgical History:   Procedure Laterality Date    GALLBLADDER SURGERY         Family History   Problem Relation Age of Onset    Squamous cell carcinoma Daughter      I have reviewed and agree with the history as documented  E-Cigarette/Vaping    E-Cigarette Use Never User      E-Cigarette/Vaping Substances     Social History     Tobacco Use    Smoking status: Never Smoker    Smokeless tobacco: Never Used   Substance Use Topics    Alcohol use: Never     Frequency: Never    Drug use: Never       Review of Systems   Constitutional: Negative for fatigue and fever  HENT: Negative for congestion and ear pain  Eyes: Negative for discharge and redness  Respiratory: Positive for shortness of breath  Negative for apnea, cough and wheezing  Cardiovascular: Positive for chest pain  Gastrointestinal: Positive for abdominal pain  Negative for diarrhea  Endocrine: Negative for cold intolerance and polydipsia  Genitourinary: Negative for difficulty urinating and hematuria  Musculoskeletal: Negative for arthralgias and back pain  Skin: Negative for color change and rash  Allergic/Immunologic: Negative for environmental allergies and immunocompromised state  Neurological: Negative for numbness and headaches  Hematological: Negative for adenopathy  Does not bruise/bleed easily  Psychiatric/Behavioral: Negative for agitation and behavioral problems  Physical Exam  Physical Exam   Constitutional: She is oriented to person, place, and time  Vital signs are normal  She appears well-developed and well-nourished  Non-toxic appearance  HENT:   Head: Normocephalic and atraumatic  Right Ear: Tympanic membrane and external ear normal    Left Ear: Tympanic membrane and external ear normal    Nose: Nose normal  No rhinorrhea, sinus tenderness or nasal deformity  Mouth/Throat: Uvula is midline and oropharynx is clear and moist  Normal dentition  Eyes: Pupils are equal, round, and reactive to light  Conjunctivae, EOM and lids are normal  Right eye exhibits no discharge  Left eye exhibits no discharge  Neck: Trachea normal and normal range of motion  Neck supple  No JVD present  Carotid bruit is not present  Cardiovascular: Normal rate, regular rhythm, intact distal pulses and normal pulses  No extrasystoles are present  PMI is not displaced  Pulmonary/Chest: Effort normal and breath sounds normal  No accessory muscle usage  No respiratory distress  She has no wheezes  She has no rhonchi  She has no rales  Abdominal: Soft  Normal appearance and bowel sounds are normal  She exhibits no mass  There is no tenderness  There is no rigidity, no rebound and no guarding  Musculoskeletal:        Right shoulder: She exhibits normal range of motion, no bony tenderness, no swelling and no deformity  Cervical back: Normal  She exhibits normal range of motion, no tenderness, no bony tenderness and no deformity  Lymphadenopathy:     She has no cervical adenopathy  She has no axillary adenopathy  Neurological: She is alert and oriented to person, place, and time  She has normal strength and normal reflexes  No cranial nerve deficit or sensory deficit  GCS eye subscore is 4  GCS verbal subscore is 5  GCS motor subscore is 6  Skin: Skin is warm and dry  No rash noted  Psychiatric: Her speech is normal and behavior is normal  Her mood appears anxious  Nursing note and vitals reviewed        Vital Signs  ED Triage Vitals   Temperature Pulse Respirations Blood Pressure SpO2   07/05/20 1838 07/05/20 1838 07/05/20 1838 07/05/20 1838 07/05/20 1838   98 6 °F (37 °C) 84 20 (!) 204/113 97 %      Temp Source Heart Rate Source Patient Position - Orthostatic VS BP Location FiO2 (%)   07/05/20 1838 07/05/20 1851 07/05/20 1851 07/05/20 1851 --   Oral Monitor Lying Right arm       Pain Score       07/05/20 1838       5           Vitals:    07/05/20 1901 07/05/20 1905 07/05/20 1930 07/05/20 2000   BP: (!) 186/99 (!) 177/91 155/87 142/72   Pulse:   62 72   Patient Position - Orthostatic VS:  Lying Lying Lying         Visual Acuity      ED Medications  Medications   ondansetron (ZOFRAN) injection 4 mg (4 mg Intravenous Given 7/5/20 1859)   Labetalol HCl (NORMODYNE) injection 10 mg (10 mg Intravenous Given 7/5/20 1910)   iohexol (OMNIPAQUE) 350 MG/ML injection (MULTI-DOSE) 100 mL (100 mL Intravenous Given 7/5/20 1945)       Diagnostic Studies  Results Reviewed     Procedure Component Value Units Date/Time    Urine Microscopic [[id number]]  (Abnormal) Collected:  07/05/20 2051    Lab Status:  Final result Specimen:  Urine, Clean Catch Updated:  07/05/20 2052     RBC, UA None Seen /hpf      WBC, UA 1-2 /hpf      Epithelial Cells Occasional /hpf      Bacteria, UA Occasional /hpf     Urine Macroscopic, POC [[id number]]  (Abnormal) Collected:  07/05/20 2051    Lab Status:  Final result Specimen:  Urine Updated:  07/05/20 2037     Color, UA Yellow     Clarity, UA Clear     pH, UA 7 0     Leukocytes, UA Trace     Nitrite, UA Negative     Protein, UA Negative mg/dl      Glucose, UA Negative mg/dl      Ketones, UA Negative mg/dl      Urobilinogen, UA 0 2 E U /dl      Bilirubin, UA Negative     Blood, UA Negative     Specific Gravity, UA 1 010    Narrative:       CLINITEK RESULT    Novel Coronavirus Rios Donnelly [[id number]]  (Normal) Collected:  07/05/20 1900    Lab Status:  Final result Specimen:  Nasopharyngeal Swab Updated:  07/05/20 2025     SARS-CoV-2 Negative    Narrative: The specimen collection materials, transport medium, and/or testing methodology utilized in the production of these test results have been proven to be reliable in a limited validation with an abbreviated program under the Emergency Utilization Authorization provided by the FDA  Testing reported as "Presumptive positive" will be confirmed with secondary testing with a reference laboratory to ensure result accuracy    Clinical caution and judgement should be used with the interpretation of these results with consideration of the clinical impression and other laboratory testing  Testing reported as "Positive" or "Negative" has been proven to be accurate according to standard laboratory validation requirements  All testing is performed with control materials showing appropriate reactivity at standard intervals        Magnesium [[id number]]  (Normal) Collected:  07/05/20 1851    Lab Status:  Final result Specimen:  Blood from Arm, Left Updated:  07/05/20 1933     Magnesium 2 1 mg/dL     NT-BNP PRO [[id number]]  (Normal) Collected:  07/05/20 1851    Lab Status:  Final result Specimen:  Blood from Arm, Left Updated:  07/05/20 1933     NT-proBNP 123 pg/mL     Comprehensive metabolic panel [[id number]] Collected:  07/05/20 1851    Lab Status:  Final result Specimen:  Blood from Arm, Left Updated:  07/05/20 1928     Sodium 138 mmol/L      Potassium 3 7 mmol/L      Chloride 102 mmol/L      CO2 28 mmol/L      ANION GAP 8 mmol/L      BUN 13 mg/dL      Creatinine 0 97 mg/dL      Glucose 128 mg/dL      Calcium 9 3 mg/dL      AST 15 U/L      ALT 23 U/L      Alkaline Phosphatase 59 U/L      Total Protein 8 1 g/dL      Albumin 4 3 g/dL      Total Bilirubin 0 81 mg/dL      eGFR 61 ml/min/1 73sq m     Narrative:       Nash guidelines for Chronic Kidney Disease (CKD):     Stage 1 with normal or high GFR (GFR > 90 mL/min/1 73 square meters)    Stage 2 Mild CKD (GFR = 60-89 mL/min/1 73 square meters)    Stage 3A Moderate CKD (GFR = 45-59 mL/min/1 73 square meters)    Stage 3B Moderate CKD (GFR = 30-44 mL/min/1 73 square meters)    Stage 4 Severe CKD (GFR = 15-29 mL/min/1 73 square meters)    Stage 5 End Stage CKD (GFR <15 mL/min/1 73 square meters)  Note: GFR calculation is accurate only with a steady state creatinine    Troponin I [[id number]]  (Normal) Collected:  07/05/20 1851    Lab Status:  Final result Specimen:  Blood from Arm, Left Updated: 07/05/20 1927     Troponin I <0 02 ng/mL     CBC and differential [[id number]]  (Abnormal) Collected:  07/05/20 1851    Lab Status:  Final result Specimen:  Blood from Arm, Left Updated:  07/05/20 1905     WBC 7 36 Thousand/uL      RBC 5 12 Million/uL      Hemoglobin 16 0 g/dL      Hematocrit 48 9 %      MCV 96 fL      MCH 31 3 pg      MCHC 32 7 g/dL      RDW 12 6 %      MPV 9 7 fL      Platelets 822 Thousands/uL      nRBC 0 /100 WBCs      Neutrophils Relative 59 %      Immat GRANS % 0 %      Lymphocytes Relative 34 %      Monocytes Relative 5 %      Eosinophils Relative 1 %      Basophils Relative 1 %      Neutrophils Absolute 4 41 Thousands/µL      Immature Grans Absolute 0 03 Thousand/uL      Lymphocytes Absolute 2 51 Thousands/µL      Monocytes Absolute 0 33 Thousand/µL      Eosinophils Absolute 0 04 Thousand/µL      Basophils Absolute 0 04 Thousands/µL                  CTA dissection protocol chest abdomen pelvis w wo contrast   Final Result by Rio Burgess MD (07/05 2038)      No acute pathology visualized on CT angiography of the chest, abdomen and pelvis with IV without oral contrast       Workstation performed: [id number]         XR chest 1 view portable    (Results Pending)              Procedures  ECG 12 Lead Documentation Only  Date/Time: 7/5/2020 6:47 PM  Performed by: Linard Leventhal, DO  Authorized by: Linard Leventhal, DO     Patient location:  ED  Rate:     ECG rate:  80  Rhythm:     Rhythm: sinus rhythm    Ectopy:     Ectopy: none    QRS:     QRS axis:  Normal  Conduction:     Conduction: normal               ED Course       US AUDIT      Most Recent Value   Initial Alcohol Screen: US AUDIT-C    1  How often do you have a drink containing alcohol?  0 Filed at: 07/05/2020 1841   2  How many drinks containing alcohol do you have on a typical day you are drinking? 0 Filed at: 07/05/2020 1841   3a  Male UNDER 65: How often do you have five or more drinks on one occasion?   0 Filed at: 07/05/2020 4743 3b  FEMALE Any Age, or MALE 65+: How often do you have 4 or more drinks on one occassion? 0 Filed at: 07/05/2020 1841   Audit-C Score  0 Filed at: 07/05/2020 1841            HEART Risk Score      Most Recent Value   Heart Score Risk Calculator   History  1 Filed at: 07/05/2020 2057   ECG  0 Filed at: 07/05/2020 2057   Age  2 Filed at: 07/05/2020 2057   Risk Factors  1 Filed at: 07/05/2020 2057   Troponin  0 Filed at: 07/05/2020 2057   HEART Score  4 Filed at: 07/05/2020 2057            SOFIA/DAST-10      Most Recent Value   How many times in the past year have you    Used an illegal drug or used a prescription medication for non-medical reasons? Never Filed at: 07/05/2020 1841                                MDM  Number of Diagnoses or Management Options  Chest pain: new and requires workup  Dizziness: new and requires workup  Hypertensive urgency: new and requires workup     Amount and/or Complexity of Data Reviewed  Clinical lab tests: ordered and reviewed  Tests in the radiology section of CPT®: ordered and reviewed  Tests in the medicine section of CPT®: ordered and reviewed  Discuss the patient with other providers: yes    Risk of Complications, Morbidity, and/or Mortality  General comments: Although we have corrected her blood pressure patient still continues to be dizzy and short of breath with ambulation      Patient Progress  Patient progress: stable        Disposition  Final diagnoses:   Chest pain   Dizziness   Hypertensive urgency     Time reflects when diagnosis was documented in both MDM as applicable and the Disposition within this note     Time User Action Codes Description Comment    7/5/2020  9:04 PM Megan Vail [R07 9] Chest pain     7/5/2020  9:06 PM Daniel COOL Add [R42] Dizziness     7/5/2020  9:06 PM Megan Vail [I16 0] Hypertensive urgency       ED Disposition     ED Disposition Condition Date/Time Comment    Admit Stable Sun Jul 5, 2020  9:04 PM Case was discussed with dr Olga Leger and the patient's admission status was agreed to be Admission Status: observation status to the service of Dr Olga Leger   Follow-up Information    None         Patient's Medications   Discharge Prescriptions    No medications on file     No discharge procedures on file      PDMP Review     None          ED Provider  Electronically Signed by           Amara Ruff,   07/05/20 196 Zak Kahn DO  07/05/20 7888

## 2020-07-06 VITALS
BODY MASS INDEX: 28.56 KG/M2 | OXYGEN SATURATION: 98 % | RESPIRATION RATE: 19 BRPM | SYSTOLIC BLOOD PRESSURE: 123 MMHG | HEART RATE: 68 BPM | TEMPERATURE: 98.5 F | HEIGHT: 62 IN | WEIGHT: 155.2 LBS | DIASTOLIC BLOOD PRESSURE: 72 MMHG

## 2020-07-06 LAB
ANION GAP SERPL CALCULATED.3IONS-SCNC: 11 MMOL/L (ref 4–13)
ATRIAL RATE: 53 BPM
ATRIAL RATE: 62 BPM
ATRIAL RATE: 64 BPM
ATRIAL RATE: 80 BPM
BUN SERPL-MCNC: 11 MG/DL (ref 5–25)
CALCIUM SERPL-MCNC: 9.2 MG/DL (ref 8.3–10.1)
CHLORIDE SERPL-SCNC: 103 MMOL/L (ref 100–108)
CHOLEST SERPL-MCNC: 225 MG/DL (ref 50–200)
CO2 SERPL-SCNC: 26 MMOL/L (ref 21–32)
CREAT SERPL-MCNC: 0.92 MG/DL (ref 0.6–1.3)
ERYTHROCYTE [DISTWIDTH] IN BLOOD BY AUTOMATED COUNT: 13 % (ref 11.6–15.1)
EST. AVERAGE GLUCOSE BLD GHB EST-MCNC: 105 MG/DL
GFR SERPL CREATININE-BSD FRML MDRD: 65 ML/MIN/1.73SQ M
GLUCOSE P FAST SERPL-MCNC: 94 MG/DL (ref 65–99)
GLUCOSE SERPL-MCNC: 94 MG/DL (ref 65–140)
HBA1C MFR BLD: 5.3 %
HCT VFR BLD AUTO: 44.3 % (ref 34.8–46.1)
HDLC SERPL-MCNC: 33 MG/DL
HGB BLD-MCNC: 14.3 G/DL (ref 11.5–15.4)
LDLC SERPL CALC-MCNC: 143 MG/DL (ref 0–100)
MCH RBC QN AUTO: 31.2 PG (ref 26.8–34.3)
MCHC RBC AUTO-ENTMCNC: 32.3 G/DL (ref 31.4–37.4)
MCV RBC AUTO: 97 FL (ref 82–98)
NONHDLC SERPL-MCNC: 192 MG/DL
P AXIS: 35 DEGREES
P AXIS: 52 DEGREES
P AXIS: 58 DEGREES
P AXIS: 68 DEGREES
PLATELET # BLD AUTO: 217 THOUSANDS/UL (ref 149–390)
PMV BLD AUTO: 10 FL (ref 8.9–12.7)
POTASSIUM SERPL-SCNC: 3.7 MMOL/L (ref 3.5–5.3)
PR INTERVAL: 166 MS
PR INTERVAL: 172 MS
PR INTERVAL: 174 MS
PR INTERVAL: 176 MS
QRS AXIS: 0 DEGREES
QRS AXIS: 33 DEGREES
QRS AXIS: 6 DEGREES
QRS AXIS: 6 DEGREES
QRSD INTERVAL: 66 MS
QRSD INTERVAL: 68 MS
QRSD INTERVAL: 72 MS
QRSD INTERVAL: 72 MS
QT INTERVAL: 396 MS
QT INTERVAL: 418 MS
QT INTERVAL: 442 MS
QT INTERVAL: 464 MS
QTC INTERVAL: 424 MS
QTC INTERVAL: 435 MS
QTC INTERVAL: 455 MS
QTC INTERVAL: 456 MS
RBC # BLD AUTO: 4.58 MILLION/UL (ref 3.81–5.12)
SODIUM SERPL-SCNC: 140 MMOL/L (ref 136–145)
T WAVE AXIS: 27 DEGREES
T WAVE AXIS: 41 DEGREES
T WAVE AXIS: 42 DEGREES
T WAVE AXIS: 48 DEGREES
TRIGL SERPL-MCNC: 246 MG/DL
TROPONIN I SERPL-MCNC: <0.02 NG/ML
TROPONIN I SERPL-MCNC: <0.02 NG/ML
VENTRICULAR RATE: 53 BPM
VENTRICULAR RATE: 62 BPM
VENTRICULAR RATE: 64 BPM
VENTRICULAR RATE: 80 BPM
WBC # BLD AUTO: 7.98 THOUSAND/UL (ref 4.31–10.16)

## 2020-07-06 PROCEDURE — 84484 ASSAY OF TROPONIN QUANT: CPT | Performed by: PHYSICIAN ASSISTANT

## 2020-07-06 PROCEDURE — 85027 COMPLETE CBC AUTOMATED: CPT | Performed by: PHYSICIAN ASSISTANT

## 2020-07-06 PROCEDURE — 80048 BASIC METABOLIC PNL TOTAL CA: CPT | Performed by: PHYSICIAN ASSISTANT

## 2020-07-06 PROCEDURE — 99217 PR OBSERVATION CARE DISCHARGE MANAGEMENT: CPT | Performed by: INTERNAL MEDICINE

## 2020-07-06 PROCEDURE — 80061 LIPID PANEL: CPT | Performed by: PHYSICIAN ASSISTANT

## 2020-07-06 PROCEDURE — 99204 OFFICE O/P NEW MOD 45 MIN: CPT | Performed by: PHYSICIAN ASSISTANT

## 2020-07-06 PROCEDURE — 93010 ELECTROCARDIOGRAM REPORT: CPT | Performed by: INTERNAL MEDICINE

## 2020-07-06 PROCEDURE — 93005 ELECTROCARDIOGRAM TRACING: CPT

## 2020-07-06 RX ORDER — HYDROCHLOROTHIAZIDE 12.5 MG/1
12.5 TABLET ORAL DAILY
Qty: 30 TABLET | Refills: 0 | Status: SHIPPED | OUTPATIENT
Start: 2020-07-06 | End: 2020-08-17 | Stop reason: HOSPADM

## 2020-07-06 RX ORDER — SUCRALFATE ORAL 1 G/10ML
1000 SUSPENSION ORAL EVERY 6 HOURS SCHEDULED
Qty: 420 ML | Refills: 0 | Status: SHIPPED | OUTPATIENT
Start: 2020-07-06 | End: 2020-09-08 | Stop reason: SDUPTHER

## 2020-07-06 RX ORDER — PANTOPRAZOLE SODIUM 40 MG/1
40 TABLET, DELAYED RELEASE ORAL 2 TIMES DAILY
Qty: 20 TABLET | Refills: 0 | Status: SHIPPED | OUTPATIENT
Start: 2020-07-06 | End: 2020-08-31

## 2020-07-06 RX ORDER — AMLODIPINE BESYLATE 5 MG/1
5 TABLET ORAL DAILY
Status: DISCONTINUED | OUTPATIENT
Start: 2020-07-06 | End: 2020-07-06 | Stop reason: HOSPADM

## 2020-07-06 RX ORDER — MAGNESIUM HYDROXIDE/ALUMINUM HYDROXICE/SIMETHICONE 120; 1200; 1200 MG/30ML; MG/30ML; MG/30ML
30 SUSPENSION ORAL EVERY 4 HOURS PRN
Qty: 355 ML | Refills: 0 | Status: SHIPPED | OUTPATIENT
Start: 2020-07-06 | End: 2022-07-01

## 2020-07-06 RX ORDER — PANTOPRAZOLE SODIUM 40 MG/1
40 TABLET, DELAYED RELEASE ORAL
Status: DISCONTINUED | OUTPATIENT
Start: 2020-07-06 | End: 2020-07-06 | Stop reason: HOSPADM

## 2020-07-06 RX ORDER — ONDANSETRON 2 MG/ML
4 INJECTION INTRAMUSCULAR; INTRAVENOUS EVERY 6 HOURS PRN
Status: DISCONTINUED | OUTPATIENT
Start: 2020-07-06 | End: 2020-07-06 | Stop reason: HOSPADM

## 2020-07-06 RX ORDER — MECLIZINE HYDROCHLORIDE 25 MG/1
25 TABLET ORAL 3 TIMES DAILY PRN
Qty: 30 TABLET | Refills: 0 | Status: SHIPPED | OUTPATIENT
Start: 2020-07-06 | End: 2022-07-01

## 2020-07-06 RX ORDER — HYDROCHLOROTHIAZIDE 12.5 MG/1
12.5 TABLET ORAL DAILY
Status: DISCONTINUED | OUTPATIENT
Start: 2020-07-06 | End: 2020-07-06 | Stop reason: HOSPADM

## 2020-07-06 RX ORDER — AMLODIPINE BESYLATE 5 MG/1
5 TABLET ORAL DAILY
Qty: 30 TABLET | Refills: 0 | Status: SHIPPED | OUTPATIENT
Start: 2020-07-06

## 2020-07-06 RX ORDER — SUCRALFATE ORAL 1 G/10ML
1000 SUSPENSION ORAL EVERY 6 HOURS SCHEDULED
Status: DISCONTINUED | OUTPATIENT
Start: 2020-07-06 | End: 2020-07-06 | Stop reason: HOSPADM

## 2020-07-06 RX ADMIN — ONDANSETRON 4 MG: 2 INJECTION INTRAMUSCULAR; INTRAVENOUS at 11:56

## 2020-07-06 RX ADMIN — HYDROCHLOROTHIAZIDE 12.5 MG: 12.5 TABLET ORAL at 10:47

## 2020-07-06 RX ADMIN — AMLODIPINE BESYLATE 5 MG: 5 TABLET ORAL at 10:48

## 2020-07-06 RX ADMIN — ENOXAPARIN SODIUM 40 MG: 40 INJECTION SUBCUTANEOUS at 08:20

## 2020-07-06 RX ADMIN — SUCRALFATE 1000 MG: 1 SUSPENSION ORAL at 11:56

## 2020-07-06 RX ADMIN — PANTOPRAZOLE SODIUM 40 MG: 40 TABLET, DELAYED RELEASE ORAL at 05:46

## 2020-07-06 RX ADMIN — ACETAMINOPHEN 650 MG: 325 TABLET, FILM COATED ORAL at 08:19

## 2020-07-06 RX ADMIN — LEVOTHYROXINE SODIUM 75 MCG: 75 TABLET ORAL at 05:46

## 2020-07-06 RX ADMIN — MECLIZINE HYDROCHLORIDE 25 MG: 25 TABLET ORAL at 08:19

## 2020-07-06 NOTE — ASSESSMENT & PLAN NOTE
· Patient was severe dizziness, and shortness of breath especially with movement of her head  · She states that she has been dizzy for the past couple of days expect she was sitting up, and moving of her head  · She did have horizontal nystagmus on exam   · Likely related to BPPV as she does have a history of vertigo in the past which she was treated of physical therapy

## 2020-07-06 NOTE — ASSESSMENT & PLAN NOTE
· Patient was severe dizziness, and shortness of breath especially with movement of her head  · She states that she has been dizzy for the past couple of days expect she was sitting up, and moving of her head  · She did have horizontal nystagmus on exam   · Likely related to BPPV as she does have a history of vertigo in the past which she was treated of physical therapy  · Meclizine q 8 hours scheduled

## 2020-07-06 NOTE — UTILIZATION REVIEW
Initial Clinical Review    Admission: Date/Time/Statement: Admission Orders (From admission, onward)     Ordered        07/05/20 2107  Place in Observation  Once                   Orders Placed This Encounter   Procedures    Place in Observation     Standing Status:   Standing     Number of Occurrences:   1     Order Specific Question:   Admitting Physician     Answer:   Jairon Madsen [[id number]]     Order Specific Question:   Level of Care     Answer:   Med Surg [[id number]]     ED Arrival Information     Expected Arrival Acuity Means of Arrival Escorted By Service Admission Type    - 7/5/2020 18:31 Emergent Walk-In Family Member Hospitalist Emergency    Arrival Complaint    SOB        Chief Complaint   Patient presents with    Shortness of Breath     leg weakness walking up driveway yesterday; woke up with burning cp today; nausea     Assessment/Plan: 75 yo female to ED from home admitted to Observation admission with chest pain, dizziness, and shortness of breath  PMHx for BPV,  hypothyroidism  Reports spell of SOB, dizziness beginning day before of admission after walking up driveway  Today, she reports acute substernal chest pain & burning radiation across chest into her back  IN ED she presents with HTN urgency with SBPs: 200  She received IV labetalol with improvement  CT negative ; ekg w/o ischemic changes, EKG= nsr  Maintain telemetry, trend trops, fasting lipid panel & A1c  Monitor BPs  Monitor dizziness; Meclizine Q 8hr  Cont PPI for GERD   Consult GI      7/6 GI=    ED Triage Vitals   Temperature Pulse Respirations Blood Pressure SpO2   07/05/20 1838 07/05/20 1838 07/05/20 1838 07/05/20 1838 07/05/20 1838   98 6 °F (37 °C) 84 20 (!) 204/113 97 %      Temp Source Heart Rate Source Patient Position - Orthostatic VS BP Location FiO2 (%)   07/05/20 1838 07/05/20 1851 07/05/20 1851 07/05/20 1851 --   Oral Monitor Lying Right arm       Pain Score       07/05/20 1838       5        Wt Readings from Last 1 Encounters: 07/05/20 70 4 kg (155 lb 3 3 oz)     Additional Vital Signs:   Date/Time  Temp  Pulse  Resp  BP  MAP (mmHg)  SpO2  O2 Device  Patient Position - Orthostatic VS   07/06/20 0700  98 5 °F (36 9 °C)  68  19  143/68  --  98 %  --  Lying   07/05/20 2245  --  --  --  --  --  --  None (Room air)  --   07/05/20 2200  98 5 °F (36 9 °C)  64  18  137/73  --  97 %  None (Room air)  Lying   07/05/20 2135  --  65  18  130/73  --  97 %  None (Room air)  Lying   07/05/20 2000  --  72  16  142/72  101  98 %  None (Room air)  Lying   07/05/20 1930  --  62  18  155/87  114  97 %  None (Room air)  Lying   07/05/20 1912  --  --  --  --  --  --  None (Room air)  --   07/05/20 1905  --  --  --  177/91Abnormal   127  --  --  Lying   07/05/20 1901  --  --  --  186/99Abnormal   --  --  --  --   07/05/20 1900  --  80  --  183/95Abnormal   131  --  --  --   07/05/20 1851  --  86  20  183/95Abnormal   --  --  --  Lying   07/05/20 1838  98 6 °F (37 °C)  84  20  204/113Abnormal   --  97 %  None (Room air)  --      Weights (last 14 days)     Date/Time  Weight  Weight Method  Height   07/05/20 2200  70 4 kg (155 lb 3 3 oz)  Standing scale  5' 2" (1 575 m)   07/05/20 1838  72 1 kg (158 lb 15 2 oz)  Bed scale         Pertinent Labs/Diagnostic Test Results:   Results from last 7 days   Lab Units 07/05/20 1900   SARS-COV-2  Negative     Results from last 7 days   Lab Units 07/06/20  0444 07/05/20  2224 07/05/20  1851   WBC Thousand/uL 7 98  --  7 36   HEMOGLOBIN g/dL 14 3  --  16 0*   HEMATOCRIT % 44 3  --  48 9*   PLATELETS Thousands/uL 217 224 236   NEUTROS ABS Thousands/µL  --   --  4 41         Results from last 7 days   Lab Units 07/06/20  0444 07/05/20  1851   SODIUM mmol/L 140 138   POTASSIUM mmol/L 3 7 3 7   CHLORIDE mmol/L 103 102   CO2 mmol/L 26 28   ANION GAP mmol/L 11 8   BUN mg/dL 11 13   CREATININE mg/dL 0 92 0 97   EGFR ml/min/1 73sq m 65 61   CALCIUM mg/dL 9 2 9 3   MAGNESIUM mg/dL  --  2 1     Results from last 7 days   Lab Units 07/05/20  1851   AST U/L 15   ALT U/L 23   ALK PHOS U/L 59   TOTAL PROTEIN g/dL 8 1   ALBUMIN g/dL 4 3   TOTAL BILIRUBIN mg/dL 0 81         Results from last 7 days   Lab Units 07/06/20  0444 07/05/20  1851   GLUCOSE RANDOM mg/dL 94 128         Results from last 7 days   Lab Units 07/05/20  2224   HEMOGLOBIN A1C % 5 3   EAG mg/dl 105     No results found for: BETA-HYDROXYBUTYRATE                   Results from last 7 days   Lab Units 07/06/20  0529 07/06/20  0128 07/05/20  2223 07/05/20  1851   TROPONIN I ng/mL <0 02 <0 02 <0 02 <0 02             Results from last 7 days   Lab Units 07/05/20  2223   TSH 3RD GENERATON uIU/mL 1 186                     Results from last 7 days   Lab Units 07/05/20  1851   NT-PRO BNP pg/mL 123                         Results from last 7 days   Lab Units 07/05/20  2051   CLARITY UA  Clear   COLOR UA  Yellow   SPEC GRAV UA  1 010   PH UA  7 0   GLUCOSE UA mg/dl Negative   KETONES UA mg/dl Negative   BLOOD UA  Negative   PROTEIN UA mg/dl Negative   NITRITE UA  Negative   BILIRUBIN UA  Negative   UROBILINOGEN UA E U /dl 0 2   LEUKOCYTES UA  Trace*   WBC UA /hpf 1-2*   RBC UA /hpf None Seen   BACTERIA UA /hpf Occasional   EPITHELIAL CELLS WET PREP /hpf Occasional     ED Treatment:   Medication Administration from 07/05/2020 1831 to 07/05/2020 2141       Date/Time Order Dose Route Action     07/05/2020 1859 ondansetron (ZOFRAN) injection 4 mg 4 mg Intravenous Given     07/05/2020 1910 Labetalol HCl (NORMODYNE) injection 10 mg 10 mg Intravenous Given     07/05/2020 1945 iohexol (OMNIPAQUE) 350 MG/ML injection (MULTI-DOSE) 100 mL 100 mL Intravenous Given        Past Medical History:   Diagnosis Date    Disease of thyroid gland      Present on Admission:   Mixed hyperlipidemia   Hypothyroidism   GERD (gastroesophageal reflux disease)      Admitting Diagnosis: Shortness of breath [R06 02]  Dizziness [R42]  Chest pain [R07 9]  Hypertensive urgency [I16 0]  Age/Sex: 76 y o  female  Admission Orders:  Scheduled Medications:    Medications:  amLODIPine 5 mg Oral Daily   enoxaparin 40 mg Subcutaneous Daily   hydrochlorothiazide 12 5 mg Oral Daily   levothyroxine 75 mcg Oral Early Morning   pantoprazole 40 mg Oral Early Morning     Continuous IV Infusions:     PRN Meds:    acetaminophen 650 mg Oral Q6H PRN   aluminum-magnesium hydroxide-simethicone 30 mL Oral Q4H PRN   meclizine 25 mg Oral Q8H PRN       IP CONSULT TO GASTROENTEROLOGY  Telemetry  Cardiac diet  Contact & airborne isolation  Up oob  scd  Network Utilization Review Department  [email or web address]  ATTENTION: Please call with any questions or concerns to [phone] and carefully listen to the prompts so that you are directed to the right person  All voicemails are confidential   Randy Magana all requests for admission clinical reviews, approved or denied determinations and any other requests to dedicated fax number below belonging to the campus where the patient is receiving treatment   List of dedicated fax numbers for the Facilities:  [address] DENIALS (Administrative/Medical Necessity) [phone]   [address] (Maternity/NICU/Pediatrics) [phone]   Diana Hoffman [phone]   Beck Delay [phone]   Lela Sitter [phone]   Sara Duquesne [phone]   [address] [facility] [address] [facility] [phone]   [facility]  [phone]   [address] [facility] [address]  [address] [address] [address] [facility] [phone]

## 2020-07-06 NOTE — DISCHARGE INSTR - AVS FIRST PAGE
Dear Dian Kern,     It was our pleasure to care for you here at [facility], [facility]  It is our hope that we were always able to exceed the expected standards for your care during your stay  You were hospitalized due to ***  You were cared for on the *** floor by Sharan Kumar DO under the service of Danica Mcgregor MD with the [facility] who covers for your primary care physician (PCP), Whitfield Duane, DO, while you were hospitalized  If you have any questions or concerns related to this hospitalization, you may contact us at [phone]  For follow up as well as any medication refills, we recommend that you follow up with your primary care physician  A registered nurse will reach out to you by phone within a few days after your discharge to answer any additional questions that you may have after going home  However, at this time we provide for you here, the most important instructions / recommendations at discharge:     · Notable Medication Adjustments -   · Start mylanta, amlodipine, hydrochlorothiazide, sulcrafate   · We increased your dose of protonix  Start taking protonix 40 mg twice daily  · Testing Required after Discharge -   · EGD - your gastroenterology will set up an endoscopy when you follow-up with them  · Outpatient Stress Test - recommend early as possible  · Important follow up information -   · Follow-up with your PCP in 1 week   · Follow-up with GI in 2 weeks    · Please review this entire after visit summary as additional general instructions including medication list, appointments, activity, diet, any pertinent wound care, and other additional recommendations from your care team that may be provided for you        Sincerely,     Sharan Kumar DO

## 2020-07-06 NOTE — QUICK NOTE
[facility]ist [facility] Attending Physician Attestation Note - Discharge    I have seen and examined Irineo Rivas personally and have reviewed the medical record independently  I have reviewed the case with the resident physician including all assessments and the plan of care for each  I agree with the resident physician and offer the following addendum to the below statements by the resident physician:     Date Evaluated:  July 6, 2020  Time Evaluated:  Morning    Subjective / HPI:  This is a 79-year-old female with dyspeptic like symptoms and chest pain admitted yesterday  BERYL score is 1 and chest pain has subsided  CTA was negative for dissection  She continues to have early satiety and some belching/discomfort after eating  She states is been going on for years but acutely worse over the last couple of days  We have consulted Gastroenterology have set her up for an outpatient EGD  Overall given her negative troponins and negative EKG as well as no events on telemetry and negative CT angiogram she is medically stable for discharge  We have ordered an outpatient stress test as well    Exam:   Heart sounds regular  Chest clear  Abdomen soft nondistended  Mildly tender in epigastrium  Extremities no edema  No neurological deficits    Assessment and Plan:  Chest pain  - from a CAT cardiac standpoint troponins x3 were negative  Dissection study was negative as well  Also no events on telemetry an EKG did not show any acute ischemia  The patient will be ordered a nuclear stress test which should be done within the next 72 hours  From a GI standpoint she does have dyspepsia and this is been ongoing for several months to years acutely worse over the last couple of days  The patient complains of early satiety and nausea following meals over the last week or so      For this reason we consulted GI and they are setting up an outpatient EGD for the patient to be done at earliest convenience  Patient Centered Rounds: I have performed bedside rounds with nursing staff today  Discussions with Specialists or Other Care Team Provider:  Discussed with gastroenterology they will see the patient in the outpatient setting and perform EGD    Education and Discussions with Family / Patient:  Discussed with the patient resident updated family    Discharge Statement:  I spent 45 minutes discharging the patient  This time was spent on the day of discharge  I had direct contact with the patient on the day of discharge  Greater than 50% of the total time was spent examining patient, answering all patient questions, arranging and discussing plan of care with patient as well as directly providing post-discharge instructions  Additional time then spent on discharge activities  For detailed history, assessment, and plan of care, please review the statements below by Dr Tierra Saldaña MD

## 2020-07-06 NOTE — H&P
H&P- Marybel Fong 1955, 76 y o  female MRN: [id number]  Unit/Bed#: VIN Encounter: [id number]  Primary Care Provider: Costa Wilkes DO   Date and time admitted to hospital: 7/5/2020  6:37 PM    * Chest pain  Assessment & Plan   Patient Presentation: presented with chest burning after being short of breath yesterday walking up dir[address]   CTA negative for dissection   Likely etiology: unclear at this time but could be related to GERD, rule out ACS    BERYL: 1   Initial Troponin:  Negative  o Trend x3 or to peak   Initial EKG:  Normal sinus rhythm no ischemic changes  o Monitor on telemetry   Check fasting lipid panel and A1c   Admit under Observation Status  Hypertensive urgency  Assessment & Plan  · Hypertensive urgency present on admission  · Patient had blood pressures exceeding 279 systolic initially when she presented  · She does not have a history of high blood pressure in the past   · Labetalol given in the ED and pressures have since come down to 140s  · Continue to monitor BP  · Labetalol as needed  · Aortic dissection ruled out with negative CTA  Dizziness  Assessment & Plan  · Patient was severe dizziness, and shortness of breath especially with movement of her head  · She states that she has been dizzy for the past couple of days expect she was sitting up, and moving of her head  · She did have horizontal nystagmus on exam   · Likely related to BPPV as she does have a history of vertigo in the past which she was treated of physical therapy  · Meclizine q 8 hours scheduled  GERD (gastroesophageal reflux disease)  Assessment & Plan  · Continue PPI    · Will try Mylanta    Hypothyroidism  Assessment & Plan  · Continue levothyroxine    Mixed hyperlipidemia  Assessment & Plan  · Check lipids    VTE Prophylaxis: Enoxaparin (Lovenox)  / sequential compression device   Code Status: full   POLST: There is no POLST form on file for this patient (pre-hospital)  Discussion with family: patient and daughter at the bedside     Anticipated Length of Stay:  Patient will be admitted on an Observation basis with an anticipated length of stay of  < 2 midnights  Justification for Hospital Stay:  Chest pain rule out    Total Time for Visit, including Counseling / Coordination of Care: 1 hour  Greater than 50% of this total time spent on direct patient counseling and coordination of care  Chief Complaint:   Chest pain, dizziness, shortness of breath    History of Present Illness:    Tamiko Oates is a 76 y o  female who presents with chest pain, dizziness  Past medical history significant for hypothyroidism  She presents to the emergency department after having a spell of shortness of breath, and dizziness which began yesterday after she was walking up her driveway  She states that today she also acutely developed some substernal chest pain and burning with radiation across her chest and into her back  She has never had symptoms like this before  She denies any other associated symptoms such as arm pain, jaw pain, diaphoresis  She presented to the emergency depart in hypertensive urgency with pressures exceeding 334 systolic  She was given labetalol in pressures decreased  CTA was done in the emergency room to rule out any dissection which was negative  She had negative initial troponin, EKG was normal sinus rhythm without ischemic changes  Denies any prior cardiac history, or family history of any heart disease  She does state that she was having some dizziness especially with moving of her head and sitting up  She does have a past medical history of being BPV for which she was treated       Review of Systems:    Review of Systems   Constitutional: Negative for chills, fatigue, fever and unexpected weight change  Respiratory: Positive for chest tightness and shortness of breath  Negative for cough  Cardiovascular: Positive for chest pain  Negative for palpitations  Gastrointestinal: Negative for abdominal pain, diarrhea, nausea and vomiting  Genitourinary: Negative for decreased urine volume, dysuria, frequency and urgency  Musculoskeletal: Negative for arthralgias  Neurological: Positive for dizziness and light-headedness  Negative for syncope and headaches  All other systems reviewed and are negative  Past Medical and Surgical History:     Past Medical History:   Diagnosis Date    Disease of thyroid gland        Past Surgical History:   Procedure Laterality Date    GALLBLADDER SURGERY         Meds/Allergies:    Prior to Admission medications    Medication Sig Start Date End Date Taking? Authorizing Provider   betamethasone, augmented, (DIPROLENE-AF) 0 05 % cream Apply topically 2 (two) times a day 3/2/20   Breanne Baca MD   levothyroxine 75 mcg tablet Take 75 mcg by mouth daily 11/15/19   Historical Provider, MD   pantoprazole (PROTONIX) 40 mg tablet Take 40 mg by mouth daily 2/10/20   Historical Provider, MD GRIJALVA have reviewed home medications with patient personally  Allergies:    Allergies   Allergen Reactions    Sulfa Antibiotics Nausea Only    Medical Tape Rash       Social History:     Marital Status: /Civil Union   Occupation: unknown   Patient Pre-hospital Living Situation: lives at home   Patient Pre-hospital Level of Mobility: ambulates   Patient Pre-hospital Diet Restrictions: none   Substance Use History:   Social History     Substance and Sexual Activity   Alcohol Use Never    Frequency: Never     Social History     Tobacco Use   Smoking Status Never Smoker   Smokeless Tobacco Never Used     Social History     Substance and Sexual Activity   Drug Use Never       Family History:    Family History   Problem Relation Age of Onset    Squamous cell carcinoma Daughter        Physical Exam:     Vitals:   Blood Pressure: 130/73 (07/05/20 2135)  Pulse: 65 (07/05/20 2135)  Temperature: 98 6 °F (37 °C) (07/05/20 1838)  Temp Source: Oral (07/05/20 1838)  Respirations: 18 (07/05/20 2135)  Weight - Scale: 72 1 kg (158 lb 15 2 oz) (07/05/20 1838)  SpO2: 97 % (07/05/20 2135)    Physical Exam   Constitutional: She is oriented to person, place, and time  She appears well-developed and well-nourished  No distress  HENT:   Head: Normocephalic and atraumatic  Mouth/Throat: Mucous membranes are not pale, not dry and not cyanotic  Eyes: Pupils are equal, round, and reactive to light  Conjunctivae and EOM are normal  Right eye exhibits no discharge  Left eye exhibits no discharge  Neck: Normal range of motion  Neck supple  No JVD present  No tracheal deviation present  Cardiovascular: Normal rate, regular rhythm and normal heart sounds  No murmur heard  Pulmonary/Chest: Effort normal and breath sounds normal  She has no wheezes  She has no rales  Abdominal: Soft  Bowel sounds are normal  She exhibits no distension  There is no tenderness  Musculoskeletal: Normal range of motion  She exhibits no edema or tenderness  No lower extremity edema, Homans sign negative bilaterally  Neurological: She is alert and oriented to person, place, and time  No cranial nerve deficit or sensory deficit  Horizontal nystagmus   Skin: Skin is warm and dry  Capillary refill takes less than 2 seconds  No rash noted  She is not diaphoretic  No erythema  Psychiatric: She has a normal mood and affect  Nursing note and vitals reviewed  Additional Data:     Lab Results: I have personally reviewed pertinent reports        Results from last 7 days   Lab Units 07/05/20  1851   WBC Thousand/uL 7 36   HEMOGLOBIN g/dL 16 0*   HEMATOCRIT % 48 9*   PLATELETS Thousands/uL 236   NEUTROS PCT % 59   LYMPHS PCT % 34   MONOS PCT % 5   EOS PCT % 1     Results from last 7 days   Lab Units 07/05/20  1851   SODIUM mmol/L 138   POTASSIUM mmol/L 3 7   CHLORIDE mmol/L 102   CO2 mmol/L 28   BUN mg/dL 13   CREATININE mg/dL 0 97   ANION GAP mmol/L 8   CALCIUM mg/dL 9 3   ALBUMIN g/dL 4 3   TOTAL BILIRUBIN mg/dL 0 81   ALK PHOS U/L 59   ALT U/L 23   AST U/L 15   GLUCOSE RANDOM mg/dL 128                       Imaging: I have personally reviewed pertinent reports  CTA dissection protocol chest abdomen pelvis w wo contrast   Final Result by Patricio Rodriguez MD (07/05 2038)      No acute pathology visualized on CT angiography of the chest, abdomen and pelvis with IV without oral contrast       Workstation performed: [id number]         XR chest 1 view portable    (Results Pending)       EKG, Pathology, and Other Studies Reviewed on Admission:   · EKG: nsr with no ischemic changes     Allscripts / Epic Records Reviewed: Yes     ** Please Note: This note has been constructed using a voice recognition system   **

## 2020-07-06 NOTE — ASSESSMENT & PLAN NOTE
· Hypertensive urgency present on admission  · Patient had blood pressures exceeding 723 systolic initially when she presented  · She does not have a history of high blood pressure in the past   · Labetalol given in the ED and pressures have since come down to 140s  · Continue to monitor BP  · Labetalol as needed  · Aortic dissection ruled out with negative CTA

## 2020-07-06 NOTE — ASSESSMENT & PLAN NOTE
· Hypertensive urgency present on admission  · Patient had blood pressures exceeding 426 systolic initially when she presented  · Aortic dissection ruled out with negative CTA  · She does not have a history of high blood pressure in the past   · Labetalol given in the ED and pressures have since come down to 140s  · BP remained in 140s overnight     · Based on Stage II HTN prescribed amlodipine and HCTZ

## 2020-07-06 NOTE — ASSESSMENT & PLAN NOTE
 Patient Presentation: presented with chest burning after being short of breath yesterday walking up dir[address]   CTA negative for dissection   Likely etiology: unclear at this time but could be related to GERD, rule out ACS    BERYL: 1   Initial Troponin:  Negative  o Trend x3 or to peak   Initial EKG:  Normal sinus rhythm no ischemic changes  o Monitor on telemetry   Check fasting lipid panel and A1c   Admit under Observation Status

## 2020-07-06 NOTE — ASSESSMENT & PLAN NOTE
 Patient Presentation: presented with chest burning after being short of breath yesterday walking up dirve[address]   CTA negative for dissection   BERYL: 1   Troponin:  Negative x3   Initial EKG: Normal sinus rhythm no ischemic changes  o Monitored on telemetry  No overnight events  · NM Stress Test ordered for outpatient  Pt was instructed and understood to complete this asap  · Likely etiology: Suspect dyspepsia considering epigastric pain w/ gaurding  · GI consulted and recommended outpatient EGD, PPI, and carafate  · GI referral and medications were provided

## 2020-07-06 NOTE — PLAN OF CARE
Problem: CARDIOVASCULAR - ADULT  Goal: Maintains optimal cardiac output and hemodynamic stability  Description  INTERVENTIONS:  - Monitor I/O, vital signs and rhythm  - Monitor for S/S and trends of decreased cardiac output  - Administer and titrate ordered vasoactive medications to optimize hemodynamic stability  - Assess quality of pulses, skin color and temperature  - Assess for signs of decreased coronary artery perfusion  - Instruct patient to report change in severity of symptoms  Outcome: Progressing  Goal: Absence of cardiac dysrhythmias or at baseline rhythm  Description  INTERVENTIONS:  - Continuous cardiac monitoring, vital signs, obtain 12 lead EKG if ordered  - Administer antiarrhythmic and heart rate control medications as ordered  - Monitor electrolytes and administer replacement therapy as ordered  Outcome: Progressing     Problem: PAIN - ADULT  Goal: Verbalizes/displays adequate comfort level or baseline comfort level  Description  Interventions:  - Encourage patient to monitor pain and request assistance  - Assess pain using appropriate pain scale  - Administer analgesics based on type and severity of pain and evaluate response  - Implement non-pharmacological measures as appropriate and evaluate response  - Consider cultural and social influences on pain and pain management  - Notify physician/advanced practitioner if interventions unsuccessful or patient reports new pain  Outcome: Progressing     Problem: SAFETY ADULT  Goal: Patient will remain free of falls  Description  INTERVENTIONS:  - Assess patient frequently for physical needs  -  Identify cognitive and physical deficits and behaviors that affect risk of falls    -  [place] fall precautions as indicated by assessment   - Educate patient/family on patient safety including physical limitations  - Instruct patient to call for assistance with activity based on assessment  - Modify environment to reduce risk of injury  - Consider OT/PT consult to assist with strengthening/mobility  Outcome: Progressing     Problem: DISCHARGE PLANNING  Goal: Discharge to home or other facility with appropriate resources  Description  INTERVENTIONS:  - Identify barriers to discharge w/patient and caregiver  - Arrange for needed discharge resources and transportation as appropriate  - Identify discharge learning needs (meds, wound care, etc )  - Arrange for interpretive services to assist at discharge as needed  - Refer to Case Management Department for coordinating discharge planning if the patient needs post-hospital services based on physician/advanced practitioner order or complex needs related to functional status, cognitive ability, or social support system  Outcome: Progressing     Problem: Knowledge Deficit  Goal: Patient/family/caregiver demonstrates understanding of disease process, treatment plan, medications, and discharge instructions  Description  Complete learning assessment and assess knowledge base    Interventions:  - Provide teaching at level of understanding  - Provide teaching via preferred learning methods  Outcome: Progressing

## 2020-07-06 NOTE — CONSULTS
Consultation - [facility]  Matheus Butt 76 y o  female MRN: [id number]  Unit/Bed#: S -01 Encounter: [id number]         Reason for Consult / Principal Problem:  Epigastric pain, nausea and GERD    HPI:  Kandice Marinelli is a 50-year-old female with history of hypothyroidism, hyperlipidemia, GERD, and hypertension  Patient presented to the emergency room yesterday for chest pain, nausea and shortness of breath  On admission, her blood pressure was 204/113  CT a on admission negative  Troponin negative x3  LFTs within normal limits  She was given labetalol, and her blood pressure has stabilized  GI was consulted as the patient continues to have epigastric discomfort with nausea  Patient reports that she has also noticed increased regurgitation that began about a month ago  She reports that her symptoms are similar to when she had "gallbladder attacks" prior to cholecystectomy  She denies NSAID use  Her last EGD and colonoscopy was about 4 years ago at [facility]  Patient was told that she had reflux damage on EGD  Otherwise, she does not recall other details  Review of Systems:    CONSTITUTIONAL: Denies any fever, chills, or rigors  Good appetite, and no recent weight loss  HEENT: No earache or tinnitus  Denies hearing loss or visual disturbances  CARDIOVASCULAR: No chest pain or palpitations  RESPIRATORY: Denies any cough, hemoptysis, shortness of breath or dyspnea on exertion  GASTROINTESTINAL: As noted in the History of Present Illness  GENITOURINARY: No problems with urination  Denies any hematuria or dysuria  NEUROLOGIC: No dizziness or vertigo, denies headaches  MUSCULOSKELETAL: Denies any muscle or joint pain  SKIN: Denies skin rashes or itching  ENDOCRINE: Denies excessive thirst  Denies intolerance to heat or cold  PSYCHOSOCIAL: Denies depression or anxiety  Denies any recent memory loss         Historical Information   Past Medical History:   Diagnosis Date    Disease of thyroid gland      Past Surgical History:   Procedure Laterality Date    GALLBLADDER SURGERY       Social History   Social History     Substance and Sexual Activity   Alcohol Use Not Currently    Frequency: Never     Social History     Substance and Sexual Activity   Drug Use Never     Social History     Tobacco Use   Smoking Status Never Smoker   Smokeless Tobacco Never Used     Family History   Problem Relation Age of Onset    Squamous cell carcinoma Daughter         Meds/Allergies     Current Facility-Administered Medications   Medication Dose Route Frequency    acetaminophen (TYLENOL) tablet 650 mg  650 mg Oral Q6H PRN    aluminum-magnesium hydroxide-simethicone (MYLANTA) 200-200-20 mg/5 mL oral suspension 30 mL  30 mL Oral Q4H PRN    amLODIPine (NORVASC) tablet 5 mg  5 mg Oral Daily    enoxaparin (LOVENOX) subcutaneous injection 40 mg  40 mg Subcutaneous Daily    hydrochlorothiazide (HYDRODIURIL) tablet 12 5 mg  12 5 mg Oral Daily    levothyroxine tablet 75 mcg  75 mcg Oral Early Morning    meclizine (ANTIVERT) tablet 25 mg  25 mg Oral Q8H PRN    ondansetron (ZOFRAN) injection 4 mg  4 mg Intravenous Q6H PRN    pantoprazole (PROTONIX) EC tablet 40 mg  40 mg Oral BID AC    sucralfate (CARAFATE) oral suspension 1,000 mg  1,000 mg Oral Q6H [address]       Allergies   Allergen Reactions    Sulfa Antibiotics Nausea Only    Medical Tape Rash         Objective     Blood pressure 123/72, pulse 68, temperature 98 5 °F (36 9 °C), temperature source Oral, resp  rate 19, height 5' 2" (1 575 m), weight 70 4 kg (155 lb 3 3 oz), SpO2 98 %  No intake or output data in the 24 hours ending 07/06/20 1328      PHYSICAL EXAM:      General Appearance:   Alert and oriented x 3   Cooperative, and in no respiratory distress   HEENT:   Normocephalic, atraumatic, anicteric      Neck:  Supple, symmetrical, trachea midline   Lungs:   Clear to auscultation bilaterally; no rales, rhonchi or wheezing; respirations unlabored    Heart[de-identified]   S1 and S2 normal; regular rate and rhythm; no murmur, rub, or gallop  Abdomen:   Soft, epigastric tenderness with deep palpation without guarding or rigidity, non-distended; normal bowel sounds; no masses, no organomegaly    Genitalia:   Deferred    Rectal:   Deferred    Extremities:  No cyanosis, clubbing or edema    Pulses:  2+ and symmetric all extremities    Skin:  Skin color, texture, turgor normal, no rashes or lesions    Lymph nodes:  No palpable cervical or supraclavicular lymphadenopathy        Lab Results:   Results from last 7 days   Lab Units 07/06/20  0444  07/05/20  1851   WBC Thousand/uL 7 98  --  7 36   HEMOGLOBIN g/dL 14 3  --  16 0*   HEMATOCRIT % 44 3  --  48 9*   PLATELETS Thousands/uL 217   < > 236   NEUTROS PCT %  --   --  59   LYMPHS PCT %  --   --  34   MONOS PCT %  --   --  5   EOS PCT %  --   --  1    < > = values in this interval not displayed  Results from last 7 days   Lab Units 07/06/20 0444 07/05/20  1851   POTASSIUM mmol/L 3 7 3 7   CHLORIDE mmol/L 103 102   CO2 mmol/L 26 28   BUN mg/dL 11 13   CREATININE mg/dL 0 92 0 97   CALCIUM mg/dL 9 2 9 3   ALK PHOS U/L  --  59   ALT U/L  --  23   AST U/L  --  15               Imaging Studies: I have personally reviewed pertinent imaging studies  Xr Chest 1 View Portable    Result Date: 7/6/2020  Impression: No acute cardiopulmonary disease  Workstation performed: [id number]     Cta Dissection Protocol Chest Abdomen Pelvis W Wo Contrast    Result Date: 7/5/2020  Impression: No acute pathology visualized on CT angiography of the chest, abdomen and pelvis with IV without oral contrast  Workstation performed: [id number]       ASSESSMENT and PLAN:      1) Noncardiac chest pain, epigastric pain and regurgitation - Patient reports that she has a history of GERD in the past   As an outpatient, she was taking Nexium once daily  She reports that she only started this about 1 week ago    She reports that she was previously on Protonix, however she felt as though this did not help her symptoms either  Hypertensive urgency resolved  CT a negative  - Patient planned for discharge  - Will plan for EGD as an outpatient to investigate  - I would like her to take Nexium twice daily, 30 minutes before breakfast and dinner   - Carafate course  - Went over GERD precautions  - Plan discussed with primary team      The patient was seen and examined by Dr Jarod Cortez, all [place] medical decisions were made with Dr Jarod Cortez  Thank you for allowing us to participate in the care of this pleasant patient  We will follow up with you closely

## 2020-07-06 NOTE — ASSESSMENT & PLAN NOTE
· ASCVD Risk 9 6%  Based on these findings consider a moderate to high intensity statin vs a trial of lifestyle intervention

## 2020-07-06 NOTE — DISCHARGE SUMMARY
Discharge- [place] Dys 1955, 76 y o  female MRN: [id number]    Unit/Bed#: S -01 Encounter: [id number]    Primary Care Provider: Chen Garcia DO   Date and time admitted to hospital: 7/5/2020  6:37 PM        * Chest pain  Assessment & Plan   Patient Presentation: presented with chest burning after being short of breath yesterday walking up [address]   CTA negative for dissection   BERYL: 1   Troponin:  Negative x3   Initial EKG: Normal sinus rhythm no ischemic changes  o Monitored on telemetry  No overnight events  · NM Stress Test ordered for outpatient  Pt was instructed and understood to complete this asap  · Likely etiology: Suspect dyspepsia considering epigastric pain w/ gaurding  · GI consulted and recommended outpatient EGD, PPI, and carafate  · GI referral and medications were provided  Hypertensive urgency  Assessment & Plan  · Hypertensive urgency present on admission  · Patient had blood pressures exceeding 720 systolic initially when she presented  · Aortic dissection ruled out with negative CTA  · She does not have a history of high blood pressure in the past   · Labetalol given in the ED and pressures have since come down to 140s  · BP remained in 140s overnight  · Based on Stage II HTN prescribed amlodipine and HCTZ    Dizziness  Assessment & Plan  · Patient was severe dizziness, and shortness of breath especially with movement of her head  · She states that she has been dizzy for the past couple of days expect she was sitting up, and moving of her head  · She did have horizontal nystagmus on exam   · Likely related to BPPV as she does have a history of vertigo in the past which she was treated of physical therapy  GERD (gastroesophageal reflux disease)  Assessment & Plan  · Continue protonix  · Will try Mylanta    Mixed hyperlipidemia  Assessment & Plan  · ASCVD Risk 9 6%   Based on these findings consider a moderate to high intensity statin vs a trial of lifestyle intervention  Hypothyroidism  Assessment & Plan  · Continue levothyroxine        Discharging Resident Physician: Sharan Kumar DO  Attending: No att  providers found  PCP: Whitfield Duane, DO  Admission Date: 7/5/2020  Discharge Date: 07/06/20    Disposition:     Home    Reason for Admission: Chest Pain     Consultations During Hospital Stay:  · GI    Procedures Performed:     · None     Significant Findings / Test Results:     · EKG: normal sinus rhythm  · Troponins: Negative x3  · CT: No acute pathology visualized on CT angiography of the chest, abdomen and pelvis with IV without oral contrast     Incidental Findings:   · None     Test Results Pending at Discharge (will require follow up): · None      Outpatient Tests Requested:  · EGD: Follow-up with GI  Referral provided  Complications:  None     Hospital Course: Dian Kern is a 76 y o  female patient who originally presented to the hospital on 7/5/2020 due to dyspeptic like symptoms and chest pain  Chest pain worsened after walking up driveway  Pain described as substernal with radiation to her back  ED cardiac workup included normal EKG (normal sinus rhythm) and negative troponins x3  BERYL score of 1  CTA was negative for dissection  Provided protonix and carafate for GI symptoms which helped alleviate her chest pain  In the AM, patient continued to have some belching/discomfort after eating  During exam, patient demonstrated epigastric tenderness  with guarding  GI was consulted and recommended continuing PPI, carafate, and scheduled outpatient EGD  Medication and referral were provided prior to discharge       Overall given her negative troponins and negative EKG as well as no events on telemetry and negative CT angiogram she is medically stable for discharge  We have ordered an outpatient NM stress test as well   Patient understands to complete test as soon as possible         Condition at Discharge: good     Discharge Day Visit / Exam:     Subjective:   Patient complained of some belching/discomfort this AM  Stated that her chest pain has improved following protonix and carafate  Denies fever, chills, N/V, SOB, abdominal pain, diarrhea, constipation  Patient is sitting comfortably at the bedside during morning evaluation  Admits that she is ready for discharge  Vitals: Blood Pressure: 123/72 (07/06/20 1047)  Pulse: 68 (07/06/20 0700)  Temperature: 98 5 °F (36 9 °C) (07/06/20 0700)  Temp Source: Oral (07/06/20 0700)  Respirations: 19 (07/06/20 0700)  Height: 5' 2" (157 5 cm) (07/05/20 2200)  Weight - Scale: 70 4 kg (155 lb 3 3 oz) (07/05/20 2200)  SpO2: 98 % (07/06/20 0700)  Exam:   Physical Exam   Constitutional: She is oriented to person, place, and time  She appears well-developed and well-nourished  HENT:   Head: Normocephalic and atraumatic  Eyes: Pupils are equal, round, and reactive to light  Conjunctivae and EOM are normal    Cardiovascular: Normal rate, regular rhythm, S1 normal and S2 normal    No murmur heard  Pulmonary/Chest: Effort normal and breath sounds normal  No respiratory distress  She has no wheezes  She has no rales  Abdominal: Soft  There is tenderness (epigastric)  There is guarding  Neurological: She is alert and oriented to person, place, and time  Discussion with Family: Yes    Discharge instructions/Information to patient and family:   See after visit summary for information provided to patient and family  Provisions for Follow-Up Care:  See after visit summary for information related to follow-up care and any pertinent home health orders  Planned Readmission: No     Discharge Medications:  See after visit summary for reconciled discharge medications provided to patient and family        ** Please Note: This note has been constructed using a voice recognition system **

## 2020-07-07 ENCOUNTER — HOSPITAL ENCOUNTER (EMERGENCY)
Facility: HOSPITAL | Age: 65
Discharge: HOME | End: 2020-07-07
Attending: EMERGENCY MEDICINE | Admitting: EMERGENCY MEDICINE
Payer: COMMERCIAL

## 2020-07-07 ENCOUNTER — APPOINTMENT (EMERGENCY)
Dept: RADIOLOGY | Facility: HOSPITAL | Age: 65
End: 2020-07-07
Payer: COMMERCIAL

## 2020-07-07 VITALS
OXYGEN SATURATION: 96 % | TEMPERATURE: 97.8 F | RESPIRATION RATE: 16 BRPM | DIASTOLIC BLOOD PRESSURE: 71 MMHG | HEART RATE: 68 BPM | BODY MASS INDEX: 27.54 KG/M2 | SYSTOLIC BLOOD PRESSURE: 125 MMHG | WEIGHT: 150.57 LBS

## 2020-07-07 DIAGNOSIS — R55 NEAR SYNCOPE: Primary | ICD-10-CM

## 2020-07-07 LAB
ANION GAP SERPL CALCULATED.3IONS-SCNC: 11 MMOL/L (ref 4–13)
ATRIAL RATE: 68 BPM
BASOPHILS # BLD AUTO: 0.04 THOUSANDS/ΜL (ref 0–0.1)
BASOPHILS NFR BLD AUTO: 0 % (ref 0–1)
BUN SERPL-MCNC: 15 MG/DL (ref 5–25)
CALCIUM SERPL-MCNC: 9.4 MG/DL (ref 8.3–10.1)
CHLORIDE SERPL-SCNC: 100 MMOL/L (ref 100–108)
CO2 SERPL-SCNC: 27 MMOL/L (ref 21–32)
CREAT SERPL-MCNC: 1.08 MG/DL (ref 0.6–1.3)
D DIMER PPP FEU-MCNC: <0.27 UG/ML FEU
EOSINOPHIL # BLD AUTO: 0.04 THOUSAND/ΜL (ref 0–0.61)
EOSINOPHIL NFR BLD AUTO: 0 % (ref 0–6)
ERYTHROCYTE [DISTWIDTH] IN BLOOD BY AUTOMATED COUNT: 12.5 % (ref 11.6–15.1)
GFR SERPL CREATININE-BSD FRML MDRD: 54 ML/MIN/1.73SQ M
GLUCOSE SERPL-MCNC: 100 MG/DL (ref 65–140)
HCT VFR BLD AUTO: 47.6 % (ref 34.8–46.1)
HGB BLD-MCNC: 16.3 G/DL (ref 11.5–15.4)
IMM GRANULOCYTES # BLD AUTO: 0.04 THOUSAND/UL (ref 0–0.2)
IMM GRANULOCYTES NFR BLD AUTO: 0 % (ref 0–2)
LYMPHOCYTES # BLD AUTO: 1.28 THOUSANDS/ΜL (ref 0.6–4.47)
LYMPHOCYTES NFR BLD AUTO: 14 % (ref 14–44)
MCH RBC QN AUTO: 32.7 PG (ref 26.8–34.3)
MCHC RBC AUTO-ENTMCNC: 34.2 G/DL (ref 31.4–37.4)
MCV RBC AUTO: 96 FL (ref 82–98)
MONOCYTES # BLD AUTO: 0.48 THOUSAND/ΜL (ref 0.17–1.22)
MONOCYTES NFR BLD AUTO: 5 % (ref 4–12)
NEUTROPHILS # BLD AUTO: 7.05 THOUSANDS/ΜL (ref 1.85–7.62)
NEUTS SEG NFR BLD AUTO: 81 % (ref 43–75)
NRBC BLD AUTO-RTO: 0 /100 WBCS
P AXIS: 60 DEGREES
PLATELET # BLD AUTO: 223 THOUSANDS/UL (ref 149–390)
PMV BLD AUTO: 9.8 FL (ref 8.9–12.7)
POTASSIUM SERPL-SCNC: 3.5 MMOL/L (ref 3.5–5.3)
PR INTERVAL: 166 MS
QRS AXIS: 13 DEGREES
QRSD INTERVAL: 78 MS
QT INTERVAL: 402 MS
QTC INTERVAL: 427 MS
RBC # BLD AUTO: 4.98 MILLION/UL (ref 3.81–5.12)
SODIUM SERPL-SCNC: 138 MMOL/L (ref 136–145)
T WAVE AXIS: 48 DEGREES
TROPONIN I SERPL-MCNC: <0.02 NG/ML
TROPONIN I SERPL-MCNC: <0.02 NG/ML
TSH SERPL DL<=0.05 MIU/L-ACNC: 1.7 UIU/ML (ref 0.36–3.74)
VENTRICULAR RATE: 68 BPM
WBC # BLD AUTO: 8.93 THOUSAND/UL (ref 4.31–10.16)

## 2020-07-07 PROCEDURE — 96360 HYDRATION IV INFUSION INIT: CPT

## 2020-07-07 PROCEDURE — 80048 BASIC METABOLIC PNL TOTAL CA: CPT | Performed by: PHYSICIAN ASSISTANT

## 2020-07-07 PROCEDURE — 93010 ELECTROCARDIOGRAM REPORT: CPT | Performed by: INTERNAL MEDICINE

## 2020-07-07 PROCEDURE — 71045 X-RAY EXAM CHEST 1 VIEW: CPT

## 2020-07-07 PROCEDURE — 36415 COLL VENOUS BLD VENIPUNCTURE: CPT | Performed by: PHYSICIAN ASSISTANT

## 2020-07-07 PROCEDURE — 84443 ASSAY THYROID STIM HORMONE: CPT | Performed by: PHYSICIAN ASSISTANT

## 2020-07-07 PROCEDURE — 85025 COMPLETE CBC W/AUTO DIFF WBC: CPT | Performed by: PHYSICIAN ASSISTANT

## 2020-07-07 PROCEDURE — 85379 FIBRIN DEGRADATION QUANT: CPT | Performed by: PHYSICIAN ASSISTANT

## 2020-07-07 PROCEDURE — 99284 EMERGENCY DEPT VISIT MOD MDM: CPT

## 2020-07-07 PROCEDURE — 84484 ASSAY OF TROPONIN QUANT: CPT | Performed by: PHYSICIAN ASSISTANT

## 2020-07-07 PROCEDURE — 99283 EMERGENCY DEPT VISIT LOW MDM: CPT | Performed by: PHYSICIAN ASSISTANT

## 2020-07-07 PROCEDURE — 93005 ELECTROCARDIOGRAM TRACING: CPT

## 2020-07-07 RX ADMIN — SODIUM CHLORIDE 1000 ML: 0.9 INJECTION, SOLUTION INTRAVENOUS at 13:20

## 2020-07-07 NOTE — DISCHARGE INSTRUCTIONS
Please proceed with your stress test as already scheduled for tomorrow  Please return to the ED if you develop any symptoms such as chest pain/shortness breath or if you have any further syncopal episodes

## 2020-07-07 NOTE — ED PROVIDER NOTES
History  Chief Complaint   Patient presents with    Syncope     Pt states that she was going to the bathroom to have a bowel movement and had a near syncopal episode  Per EMS she was pale and diaphoretc but denies CP     The patient is 48-year-old female with history of hyperthyroidism presents to the emergency department after a near syncopal episode  The patient states that she went to the bathroom and had 1 episode of diarrhea  She states while still sitting on the toilet, she became very clammy, nauseous, short of breath and shaky  She additionally reports that she had pins and needles sensation in bilateral lower extremities  She states that she felt like she was going to pass out  She reports having the symptoms for approximately 10 minutes  She states she immediately called her  in who called 911  The patient states she was just discharged from the hospital yesterday where she had a workup after experiencing chest pain and epigastric pain  She is to follow up with GI for an EGD  She states that symptoms have largely resolved as of now  She states that she did not strain at all during her bowel movement  She denies fever, chills, chest pain, abdominal pain, vomiting, headache or dizziness  History provided by:  Patient   used: No    Syncope   Associated symptoms: nausea and shortness of breath    Associated symptoms: no chest pain, no dizziness, no fever, no headaches and no vomiting        Prior to Admission Medications   Prescriptions Last Dose Informant Patient Reported?  Taking?   aluminum-magnesium hydroxide-simethicone (MYLANTA) 200-200-20 mg/5 mL suspension   No No   Sig: Take 30 mL by mouth every 4 (four) hours as needed for indigestion or heartburn   amLODIPine (NORVASC) 5 mg tablet   No No   Sig: Take 1 tablet (5 mg total) by mouth daily   betamethasone, augmented, (DIPROLENE-AF) 0 05 % cream   No No   Sig: Apply topically 2 (two) times a day hydrochlorothiazide (HYDRODIURIL) 12 5 mg tablet   No No   Sig: Take 1 tablet (12 5 mg total) by mouth daily   levothyroxine 75 mcg tablet   Yes No   Sig: Take 75 mcg by mouth daily   meclizine (ANTIVERT) 25 mg tablet   No No   Sig: Take 1 tablet (25 mg total) by mouth 3 (three) times a day as needed for dizziness   pantoprazole (PROTONIX) 40 mg tablet   No No   Sig: Take 1 tablet (40 mg total) by mouth 2 (two) times a day for 10 days   sucralfate (CARAFATE) 1 g/10 mL suspension   No No   Sig: Take 10 mL (1,000 mg total) by mouth every 6 (six) hours      Facility-Administered Medications: None       Past Medical History:   Diagnosis Date    Disease of thyroid gland     Vertigo        Past Surgical History:   Procedure Laterality Date    GALLBLADDER SURGERY         Family History   Problem Relation Age of Onset    Squamous cell carcinoma Daughter      I have reviewed and agree with the history as documented  E-Cigarette/Vaping    E-Cigarette Use Never User      E-Cigarette/Vaping Substances     Social History     Tobacco Use    Smoking status: Never Smoker    Smokeless tobacco: Never Used   Substance Use Topics    Alcohol use: Not Currently     Frequency: Never    Drug use: Never       Review of Systems   Constitutional: Negative for chills and fever  HENT: Negative for ear pain and sore throat  Eyes: Negative for redness and visual disturbance  Respiratory: Positive for shortness of breath  Negative for cough  Cardiovascular: Positive for syncope  Negative for chest pain  Gastrointestinal: Positive for diarrhea and nausea  Negative for abdominal pain and vomiting  Genitourinary: Negative for dysuria and hematuria  Musculoskeletal: Negative for back pain, neck pain and neck stiffness  Skin: Negative for color change and rash  Neurological: Positive for syncope (Near syncope)  Negative for dizziness, light-headedness and headaches     All other systems reviewed and are negative  Physical Exam  Physical Exam   Constitutional: She is oriented to person, place, and time  She appears well-developed and well-nourished  Non-toxic appearance  She does not have a sickly appearance  She does not appear ill  No distress  HENT:   Head: Normocephalic and atraumatic  Mouth/Throat: Uvula is midline, oropharynx is clear and moist and mucous membranes are normal    Eyes: Conjunctivae and lids are normal    Neck: Normal range of motion  Neck supple  Cardiovascular: Normal rate, regular rhythm and normal heart sounds  Pulmonary/Chest: Effort normal and breath sounds normal    Abdominal: Soft  She exhibits no distension  There is no tenderness  Neurological: She is alert and oriented to person, place, and time  She has normal strength  No cranial nerve deficit or sensory deficit  Coordination and gait normal    Skin: Skin is warm and dry  Nursing note and vitals reviewed        Vital Signs  ED Triage Vitals [07/07/20 1234]   Temperature Pulse Respirations Blood Pressure SpO2   97 8 °F (36 6 °C) 78 16 162/88 100 %      Temp Source Heart Rate Source Patient Position - Orthostatic VS BP Location FiO2 (%)   Oral Monitor Lying Right arm --      Pain Score       --           Vitals:    07/07/20 1234 07/07/20 1330 07/07/20 1403 07/07/20 1630   BP: 162/88 138/80 165/76 125/71   Pulse: 78 68 87 68   Patient Position - Orthostatic VS: Lying  Lying          Visual Acuity      ED Medications  Medications   sodium chloride 0 9 % bolus 1,000 mL (0 mL Intravenous Stopped 7/7/20 1426)       Diagnostic Studies  Results Reviewed     Procedure Component Value Units Date/Time    Troponin I [[id number]]  (Normal) Collected:  07/07/20 1650    Lab Status:  Final result Specimen:  Blood from Arm, Left Updated:  07/07/20 1722     Troponin I <0 02 ng/mL     TSH [[id number]]  (Normal) Collected:  07/07/20 1330    Lab Status:  Final result Specimen:  Blood from Arm, Left Updated:  07/07/20 1414     TSH 3RD URBAN 1 699 uIU/mL     Narrative:       Patients undergoing fluorescein dye angiography may retain small amounts of fluorescein in the body for 48-72 hours post procedure  Samples containing fluorescein can produce falsely depressed TSH values  If the patient had this procedure,a specimen should be resubmitted post fluorescein clearance        Troponin I [[id number]]  (Normal) Collected:  07/07/20 1330    Lab Status:  Final result Specimen:  Blood from Arm, Left Updated:  07/07/20 1402     Troponin I <0 02 ng/mL     D-Dimer [[id number]]  (Normal) Collected:  07/07/20 1330    Lab Status:  Final result Specimen:  Blood from Arm, Left Updated:  07/07/20 1358     D-Dimer, Quant <0 27 ug/ml FEU     Basic metabolic panel [[id number]] Collected:  07/07/20 1330    Lab Status:  Final result Specimen:  Blood from Arm, Left Updated:  07/07/20 1353     Sodium 138 mmol/L      Potassium 3 5 mmol/L      Chloride 100 mmol/L      CO2 27 mmol/L      ANION GAP 11 mmol/L      BUN 15 mg/dL      Creatinine 1 08 mg/dL      Glucose 100 mg/dL      Calcium 9 4 mg/dL      eGFR 54 ml/min/1 73sq m     Narrative:       Nash guidelines for Chronic Kidney Disease (CKD):     Stage 1 with normal or high GFR (GFR > 90 mL/min/1 73 square meters)    Stage 2 Mild CKD (GFR = 60-89 mL/min/1 73 square meters)    Stage 3A Moderate CKD (GFR = 45-59 mL/min/1 73 square meters)    Stage 3B Moderate CKD (GFR = 30-44 mL/min/1 73 square meters)    Stage 4 Severe CKD (GFR = 15-29 mL/min/1 73 square meters)    Stage 5 End Stage CKD (GFR <15 mL/min/1 73 square meters)  Note: GFR calculation is accurate only with a steady state creatinine    CBC and differential [[id number]]  (Abnormal) Collected:  07/07/20 1330    Lab Status:  Final result Specimen:  Blood from Arm, Left Updated:  07/07/20 1347     WBC 8 93 Thousand/uL      RBC 4 98 Million/uL      Hemoglobin 16 3 g/dL      Hematocrit 47 6 %      MCV 96 fL      MCH 32 7 pg      MCHC 34 2 g/dL      RDW 12 5 %      MPV 9 8 fL      Platelets 568 Thousands/uL      nRBC 0 /100 WBCs      Neutrophils Relative 81 %      Immat GRANS % 0 %      Lymphocytes Relative 14 %      Monocytes Relative 5 %      Eosinophils Relative 0 %      Basophils Relative 0 %      Neutrophils Absolute 7 05 Thousands/µL      Immature Grans Absolute 0 04 Thousand/uL      Lymphocytes Absolute 1 28 Thousands/µL      Monocytes Absolute 0 48 Thousand/µL      Eosinophils Absolute 0 04 Thousand/µL      Basophils Absolute 0 04 Thousands/µL                  XR chest 1 view portable   Final Result by Victorina Puckett MD (07/07 1404)      No acute cardiopulmonary disease  Workstation performed: [id number]                    Procedures  ECG 12 Lead Documentation Only  Date/Time: 7/7/2020 8:32 PM  Performed by: Damon Astorga PA-C  Authorized by: Beth Paula PA-C     Comments:      Normal sinus rhythm at 68  Normal axis  No acute ST-T changes  No significant change from EKG done yesterday  ED Course  ED Course as of Jul 07 2035   Tue Jul 07, 2020   1445 Patient is resting comfortably  She agrees to stay for a 3 hour repeat troponin  Will redraw at 1630  The patient is reporting that she feels like she does not have good circulation in her hands right now  She reports some slight tingling  On exam sensation is intact  She has strong radial pulses and good capillary refill  [phone] Patient is resting comfortably  She reports resolution of the strange sensation in her hands  56 Patient continues to rest comfortably  Vital signs remain stable  Pending repeat troponin approximately 10 minutes                  HEART Risk Score      Most Recent Value   Heart Score Risk Calculator   History  0 Filed at: 07/07/2020 1514   ECG  0 Filed at: 07/07/2020 1514   Age  2 Filed at: 07/07/2020 1514   Risk Factors  0 Filed at: 07/07/2020 1514   Troponin  0 Filed at: 07/07/2020 1514   HEART Score  2 Filed at: 07/07/2020 1514                                      [facility]  Number of Diagnoses or Management Options  Near syncope: new and requires workup  Diagnosis management comments: Patient presents for evaluation following a near syncopal episode after having 1 episode of diarrhea  Differential includes but is not limited to vasovagal syncope versus dehydration versus ACS versus PE  Labs, imaging and EKG ordered  Of note, the patient was discharged from the hospital yesterday after having an extensive chest pain workup  Patient had multiple troponin levels done, which were all within normal limits  Vasovagal syncope is highest on my list at this time  Labs and imaging reviewed with no significant findings  EKG shows no ischemic changes  Patient was re-evaluated multiple times during ED stay  Please see ED course for more details  Repeat troponin remains within normal limits  The patient has remained largely asymptomatic during her time in the ED  Her only complaint was a strange sensation in her hands, but after I adjusted her bed so she laid back flatter, she reported resolution of those symptoms  I advised the patient that I believe her symptoms today were due to a vasovagal episode  Patient is scheduled for a stress test tomorrow  I advised that she keep that appointment  Patient was given strict return to ED precautions for new or worsening symptoms  She acknowledged understanding  Patient is appropriate for discharge         Amount and/or Complexity of Data Reviewed  Clinical lab tests: ordered and reviewed  Tests in the radiology section of CPT®: ordered and reviewed  Decide to obtain previous medical records or to obtain history from someone other than the patient: yes  Review and summarize past medical records: yes    Risk of Complications, Morbidity, and/or Mortality  Presenting problems: low  Diagnostic procedures: low  Management options: low    Patient Progress  Patient progress: stable        Disposition  Final diagnoses:   Near syncope     Time reflects when diagnosis was documented in both MDM as applicable and the Disposition within this note     Time User Action Codes Description Comment    7/7/2020  5:42 PM Hari Valentin Add [R55] Near syncope       ED Disposition     ED Disposition Condition Date/Time Comment    Discharge Stable Tue Jul 7, 2020  5:42 PM Catie Ricci discharge to home/self care              Follow-up Information     Follow up With Specialties Details Why Contact Info Additional [address] [place] [address], DO Internal Medicine Schedule an appointment as soon as possible for a visit in 2 days  [id number]  [address] [place] [address] Emergency Department Emergency Medicine  If symptoms worsen [address] [facility] [address]  [phone] [facility] ED, [address], [place], [place], [address]          Discharge Medication List as of 7/7/2020  5:43 PM      CONTINUE these medications which have NOT CHANGED    Details   aluminum-magnesium hydroxide-simethicone (MYLANTA) 200-200-20 mg/5 mL suspension Take 30 mL by mouth every 4 (four) hours as needed for indigestion or heartburn, Starting Mon 7/6/2020, Normal      amLODIPine (NORVASC) 5 mg tablet Take 1 tablet (5 mg total) by mouth daily, Starting Mon 7/6/2020, Normal      betamethasone, augmented, (DIPROLENE-AF) 0 05 % cream Apply topically 2 (two) times a day, Starting Mon 3/2/2020, Normal      hydrochlorothiazide (HYDRODIURIL) 12 5 mg tablet Take 1 tablet (12 5 mg total) by mouth daily, Starting Mon 7/6/2020, Normal      levothyroxine 75 mcg tablet Take 75 mcg by mouth daily, Starting Fri 11/15/2019, Historical Med      meclizine (ANTIVERT) 25 mg tablet Take 1 tablet (25 mg total) by mouth 3 (three) times a day as needed for dizziness, Starting Mon 7/6/2020, Normal      pantoprazole (PROTONIX) 40 mg tablet Take 1 tablet (40 mg total) by mouth 2 (two) times a day for 10 days, Starting Mon 7/6/2020, Until u 7/16/2020, Normal      sucralfate (CARAFATE) 1 g/10 mL suspension Take 10 mL (1,000 mg total) by mouth every 6 (six) hours, Starting Mon 7/6/2020, Normal           No discharge procedures on file      PDMP Review     None          ED Provider  Electronically Signed by           Rachel Kang PA-C  07/07/20 2036

## 2020-07-08 ENCOUNTER — HOSPITAL ENCOUNTER (OUTPATIENT)
Dept: NON INVASIVE DIAGNOSTICS | Facility: CLINIC | Age: 65
Discharge: HOME | End: 2020-07-08
Payer: COMMERCIAL

## 2020-07-08 ENCOUNTER — TELEPHONE (OUTPATIENT)
Dept: GASTROENTEROLOGY | Facility: AMBULARY SURGERY CENTER | Age: 65
End: 2020-07-08

## 2020-07-08 DIAGNOSIS — R07.9 CHEST PAIN: ICD-10-CM

## 2020-07-08 PROCEDURE — 93016 CV STRESS TEST SUPVJ ONLY: CPT | Performed by: INTERNAL MEDICINE

## 2020-07-08 PROCEDURE — 78452 HT MUSCLE IMAGE SPECT MULT: CPT | Performed by: INTERNAL MEDICINE

## 2020-07-08 PROCEDURE — 93018 CV STRESS TEST I&R ONLY: CPT | Performed by: INTERNAL MEDICINE

## 2020-07-08 PROCEDURE — A9502 TC99M TETROFOSMIN: HCPCS

## 2020-07-08 PROCEDURE — 93017 CV STRESS TEST TRACING ONLY: CPT

## 2020-07-08 PROCEDURE — 78452 HT MUSCLE IMAGE SPECT MULT: CPT

## 2020-07-09 LAB
ARRHY DURING EX: NORMAL
CHEST PAIN STATEMENT: NORMAL
MAX DIASTOLIC BP: 80 MMHG
MAX HEART RATE: 148 BPM
MAX PREDICTED HEART RATE: 154 BPM
MAX. SYSTOLIC BP: 172 MMHG
PROTOCOL NAME: NORMAL
REASON FOR TERMINATION: NORMAL
TARGET HR FORMULA: NORMAL
TEST INDICATION: NORMAL
TIME IN EXERCISE PHASE: NORMAL

## 2020-07-28 DIAGNOSIS — I16.0 HYPERTENSIVE URGENCY: ICD-10-CM

## 2020-07-29 RX ORDER — AMLODIPINE BESYLATE 5 MG/1
TABLET ORAL
Qty: 30 TABLET | Refills: 0 | OUTPATIENT
Start: 2020-07-29

## 2020-07-29 RX ORDER — HYDROCHLOROTHIAZIDE 12.5 MG/1
TABLET ORAL
Qty: 30 TABLET | Refills: 0 | OUTPATIENT
Start: 2020-07-29

## 2020-08-14 ENCOUNTER — APPOINTMENT (EMERGENCY)
Dept: CT IMAGING | Facility: HOSPITAL | Age: 65
DRG: 392 | End: 2020-08-14
Payer: COMMERCIAL

## 2020-08-14 ENCOUNTER — HOSPITAL ENCOUNTER (INPATIENT)
Facility: HOSPITAL | Age: 65
LOS: 3 days | Discharge: HOME | DRG: 392 | End: 2020-08-17
Attending: EMERGENCY MEDICINE | Admitting: INTERNAL MEDICINE
Payer: COMMERCIAL

## 2020-08-14 DIAGNOSIS — R10.13 EPIGASTRIC PAIN: Primary | ICD-10-CM

## 2020-08-14 DIAGNOSIS — K21.9 GASTROESOPHAGEAL REFLUX DISEASE WITHOUT ESOPHAGITIS: ICD-10-CM

## 2020-08-14 DIAGNOSIS — R11.2 NAUSEA AND VOMITING: ICD-10-CM

## 2020-08-14 PROBLEM — R10.9 ABDOMINAL PAIN: Status: ACTIVE | Noted: 2020-08-14

## 2020-08-14 LAB
ALBUMIN SERPL BCP-MCNC: 4.3 G/DL (ref 3.5–5)
ALP SERPL-CCNC: 65 U/L (ref 46–116)
ALT SERPL W P-5'-P-CCNC: 31 U/L (ref 12–78)
ANION GAP SERPL CALCULATED.3IONS-SCNC: 9 MMOL/L (ref 4–13)
AST SERPL W P-5'-P-CCNC: 15 U/L (ref 5–45)
BASOPHILS # BLD AUTO: 0.04 THOUSANDS/ΜL (ref 0–0.1)
BASOPHILS NFR BLD AUTO: 1 % (ref 0–1)
BILIRUB SERPL-MCNC: 0.78 MG/DL (ref 0.2–1)
BUN SERPL-MCNC: 12 MG/DL (ref 5–25)
CALCIUM SERPL-MCNC: 9 MG/DL (ref 8.3–10.1)
CHLORIDE SERPL-SCNC: 101 MMOL/L (ref 100–108)
CO2 SERPL-SCNC: 29 MMOL/L (ref 21–32)
CREAT SERPL-MCNC: 1.01 MG/DL (ref 0.6–1.3)
EOSINOPHIL # BLD AUTO: 0.05 THOUSAND/ΜL (ref 0–0.61)
EOSINOPHIL NFR BLD AUTO: 1 % (ref 0–6)
ERYTHROCYTE [DISTWIDTH] IN BLOOD BY AUTOMATED COUNT: 12.7 % (ref 11.6–15.1)
GFR SERPL CREATININE-BSD FRML MDRD: 58 ML/MIN/1.73SQ M
GLUCOSE SERPL-MCNC: 88 MG/DL (ref 65–140)
HCT VFR BLD AUTO: 47.9 % (ref 34.8–46.1)
HGB BLD-MCNC: 15.8 G/DL (ref 11.5–15.4)
IMM GRANULOCYTES # BLD AUTO: 0.04 THOUSAND/UL (ref 0–0.2)
IMM GRANULOCYTES NFR BLD AUTO: 1 % (ref 0–2)
LACTATE SERPL-SCNC: 0.9 MMOL/L (ref 0.5–2)
LIPASE SERPL-CCNC: 118 U/L (ref 73–393)
LYMPHOCYTES # BLD AUTO: 2.56 THOUSANDS/ΜL (ref 0.6–4.47)
LYMPHOCYTES NFR BLD AUTO: 33 % (ref 14–44)
MCH RBC QN AUTO: 31.7 PG (ref 26.8–34.3)
MCHC RBC AUTO-ENTMCNC: 33 G/DL (ref 31.4–37.4)
MCV RBC AUTO: 96 FL (ref 82–98)
MONOCYTES # BLD AUTO: 0.38 THOUSAND/ΜL (ref 0.17–1.22)
MONOCYTES NFR BLD AUTO: 5 % (ref 4–12)
NEUTROPHILS # BLD AUTO: 4.63 THOUSANDS/ΜL (ref 1.85–7.62)
NEUTS SEG NFR BLD AUTO: 59 % (ref 43–75)
NRBC BLD AUTO-RTO: 0 /100 WBCS
PLATELET # BLD AUTO: 246 THOUSANDS/UL (ref 149–390)
PMV BLD AUTO: 9.7 FL (ref 8.9–12.7)
POTASSIUM SERPL-SCNC: 3.9 MMOL/L (ref 3.5–5.3)
PROT SERPL-MCNC: 8.3 G/DL (ref 6.4–8.2)
RBC # BLD AUTO: 4.99 MILLION/UL (ref 3.81–5.12)
SODIUM SERPL-SCNC: 139 MMOL/L (ref 136–145)
TROPONIN I SERPL-MCNC: <0.02 NG/ML
WBC # BLD AUTO: 7.7 THOUSAND/UL (ref 4.31–10.16)

## 2020-08-14 PROCEDURE — 36415 COLL VENOUS BLD VENIPUNCTURE: CPT | Performed by: EMERGENCY MEDICINE

## 2020-08-14 PROCEDURE — 85025 COMPLETE CBC W/AUTO DIFF WBC: CPT | Performed by: EMERGENCY MEDICINE

## 2020-08-14 PROCEDURE — 93005 ELECTROCARDIOGRAM TRACING: CPT

## 2020-08-14 PROCEDURE — 74177 CT ABD & PELVIS W/CONTRAST: CPT

## 2020-08-14 PROCEDURE — 83605 ASSAY OF LACTIC ACID: CPT | Performed by: EMERGENCY MEDICINE

## 2020-08-14 PROCEDURE — 80053 COMPREHEN METABOLIC PANEL: CPT | Performed by: EMERGENCY MEDICINE

## 2020-08-14 PROCEDURE — 99223 1ST HOSP IP/OBS HIGH 75: CPT | Performed by: PHYSICIAN ASSISTANT

## 2020-08-14 PROCEDURE — 83690 ASSAY OF LIPASE: CPT | Performed by: EMERGENCY MEDICINE

## 2020-08-14 PROCEDURE — 99285 EMERGENCY DEPT VISIT HI MDM: CPT | Performed by: EMERGENCY MEDICINE

## 2020-08-14 PROCEDURE — G1004 CDSM NDSC: HCPCS

## 2020-08-14 PROCEDURE — 99285 EMERGENCY DEPT VISIT HI MDM: CPT

## 2020-08-14 PROCEDURE — 84484 ASSAY OF TROPONIN QUANT: CPT | Performed by: EMERGENCY MEDICINE

## 2020-08-14 RX ORDER — FAMOTIDINE 20 MG/1
20 TABLET, FILM COATED ORAL ONCE
Status: COMPLETED | OUTPATIENT
Start: 2020-08-14 | End: 2020-08-14

## 2020-08-14 RX ORDER — PANTOPRAZOLE SODIUM 40 MG/1
40 TABLET, DELAYED RELEASE ORAL
Status: DISCONTINUED | OUTPATIENT
Start: 2020-08-15 | End: 2020-08-17 | Stop reason: HOSPADM

## 2020-08-14 RX ORDER — HYDRALAZINE HYDROCHLORIDE 20 MG/ML
10 INJECTION INTRAMUSCULAR; INTRAVENOUS EVERY 6 HOURS PRN
Status: DISCONTINUED | OUTPATIENT
Start: 2020-08-14 | End: 2020-08-17 | Stop reason: HOSPADM

## 2020-08-14 RX ORDER — DICYCLOMINE HYDROCHLORIDE 10 MG/1
10 CAPSULE ORAL
Status: DISCONTINUED | OUTPATIENT
Start: 2020-08-14 | End: 2020-08-17 | Stop reason: HOSPADM

## 2020-08-14 RX ORDER — ACETAMINOPHEN 325 MG/1
650 TABLET ORAL EVERY 6 HOURS PRN
Status: DISCONTINUED | OUTPATIENT
Start: 2020-08-14 | End: 2020-08-17 | Stop reason: HOSPADM

## 2020-08-14 RX ORDER — LEVOTHYROXINE SODIUM 0.07 MG/1
75 TABLET ORAL
Status: DISCONTINUED | OUTPATIENT
Start: 2020-08-15 | End: 2020-08-17 | Stop reason: HOSPADM

## 2020-08-14 RX ORDER — SODIUM CHLORIDE 9 MG/ML
50 INJECTION, SOLUTION INTRAVENOUS CONTINUOUS
Status: DISCONTINUED | OUTPATIENT
Start: 2020-08-14 | End: 2020-08-17

## 2020-08-14 RX ORDER — AMLODIPINE BESYLATE 5 MG/1
5 TABLET ORAL DAILY
Status: DISCONTINUED | OUTPATIENT
Start: 2020-08-15 | End: 2020-08-17 | Stop reason: HOSPADM

## 2020-08-14 RX ORDER — SIMETHICONE 80 MG
80 TABLET,CHEWABLE ORAL ONCE
Status: COMPLETED | OUTPATIENT
Start: 2020-08-14 | End: 2020-08-14

## 2020-08-14 RX ORDER — MAGNESIUM HYDROXIDE/ALUMINUM HYDROXICE/SIMETHICONE 120; 1200; 1200 MG/30ML; MG/30ML; MG/30ML
30 SUSPENSION ORAL EVERY 4 HOURS PRN
Status: DISCONTINUED | OUTPATIENT
Start: 2020-08-14 | End: 2020-08-17 | Stop reason: HOSPADM

## 2020-08-14 RX ORDER — MECLIZINE HYDROCHLORIDE 25 MG/1
25 TABLET ORAL 3 TIMES DAILY PRN
Status: DISCONTINUED | OUTPATIENT
Start: 2020-08-14 | End: 2020-08-17 | Stop reason: HOSPADM

## 2020-08-14 RX ORDER — SUCRALFATE ORAL 1 G/10ML
1000 SUSPENSION ORAL EVERY 6 HOURS SCHEDULED
Status: DISCONTINUED | OUTPATIENT
Start: 2020-08-15 | End: 2020-08-17 | Stop reason: HOSPADM

## 2020-08-14 RX ORDER — ACETAMINOPHEN 325 MG/1
975 TABLET ORAL ONCE
Status: COMPLETED | OUTPATIENT
Start: 2020-08-14 | End: 2020-08-14

## 2020-08-14 RX ORDER — SIMETHICONE 80 MG
80 TABLET,CHEWABLE ORAL 4 TIMES DAILY PRN
Status: DISCONTINUED | OUTPATIENT
Start: 2020-08-14 | End: 2020-08-17 | Stop reason: HOSPADM

## 2020-08-14 RX ADMIN — SODIUM CHLORIDE 75 ML/HR: 0.9 INJECTION, SOLUTION INTRAVENOUS at 23:02

## 2020-08-14 RX ADMIN — SUCRALFATE 1000 MG: 1 SUSPENSION ORAL at 23:01

## 2020-08-14 RX ADMIN — IOHEXOL 100 ML: 350 INJECTION, SOLUTION INTRAVENOUS at 19:28

## 2020-08-14 RX ADMIN — DICYCLOMINE HYDROCHLORIDE 10 MG: 10 CAPSULE ORAL at 23:01

## 2020-08-14 RX ADMIN — SIMETHICONE CHEW TAB 80 MG 80 MG: 80 TABLET ORAL at 18:59

## 2020-08-14 RX ADMIN — ACETAMINOPHEN 975 MG: 325 TABLET, FILM COATED ORAL at 18:59

## 2020-08-14 RX ADMIN — FAMOTIDINE 20 MG: 20 TABLET, FILM COATED ORAL at 18:59

## 2020-08-15 LAB
ANION GAP SERPL CALCULATED.3IONS-SCNC: 9 MMOL/L (ref 4–13)
BUN SERPL-MCNC: 13 MG/DL (ref 5–25)
CALCIUM SERPL-MCNC: 8.8 MG/DL (ref 8.3–10.1)
CHLORIDE SERPL-SCNC: 105 MMOL/L (ref 100–108)
CO2 SERPL-SCNC: 27 MMOL/L (ref 21–32)
CREAT SERPL-MCNC: 0.92 MG/DL (ref 0.6–1.3)
ERYTHROCYTE [DISTWIDTH] IN BLOOD BY AUTOMATED COUNT: 12.8 % (ref 11.6–15.1)
GFR SERPL CREATININE-BSD FRML MDRD: 65 ML/MIN/1.73SQ M
GLUCOSE SERPL-MCNC: 95 MG/DL (ref 65–140)
HCT VFR BLD AUTO: 42.6 % (ref 34.8–46.1)
HGB BLD-MCNC: 13.5 G/DL (ref 11.5–15.4)
MCH RBC QN AUTO: 31.3 PG (ref 26.8–34.3)
MCHC RBC AUTO-ENTMCNC: 31.7 G/DL (ref 31.4–37.4)
MCV RBC AUTO: 99 FL (ref 82–98)
PLATELET # BLD AUTO: 219 THOUSANDS/UL (ref 149–390)
PLATELET # BLD AUTO: 232 THOUSANDS/UL (ref 149–390)
PMV BLD AUTO: 9.7 FL (ref 8.9–12.7)
PMV BLD AUTO: 9.7 FL (ref 8.9–12.7)
POTASSIUM SERPL-SCNC: 3.9 MMOL/L (ref 3.5–5.3)
RBC # BLD AUTO: 4.32 MILLION/UL (ref 3.81–5.12)
SODIUM SERPL-SCNC: 141 MMOL/L (ref 136–145)
TROPONIN I SERPL-MCNC: <0.02 NG/ML
WBC # BLD AUTO: 6.22 THOUSAND/UL (ref 4.31–10.16)

## 2020-08-15 PROCEDURE — 84484 ASSAY OF TROPONIN QUANT: CPT | Performed by: PHYSICIAN ASSISTANT

## 2020-08-15 PROCEDURE — 80048 BASIC METABOLIC PNL TOTAL CA: CPT | Performed by: PHYSICIAN ASSISTANT

## 2020-08-15 PROCEDURE — 85027 COMPLETE CBC AUTOMATED: CPT | Performed by: PHYSICIAN ASSISTANT

## 2020-08-15 PROCEDURE — 85049 AUTOMATED PLATELET COUNT: CPT | Performed by: PHYSICIAN ASSISTANT

## 2020-08-15 PROCEDURE — 99222 1ST HOSP IP/OBS MODERATE 55: CPT | Performed by: PHYSICIAN ASSISTANT

## 2020-08-15 PROCEDURE — 99232 SBSQ HOSP IP/OBS MODERATE 35: CPT | Performed by: FAMILY MEDICINE

## 2020-08-15 RX ORDER — FAMOTIDINE 20 MG/1
20 TABLET, FILM COATED ORAL DAILY
Status: DISCONTINUED | OUTPATIENT
Start: 2020-08-15 | End: 2020-08-17 | Stop reason: HOSPADM

## 2020-08-15 RX ADMIN — SUCRALFATE 1000 MG: 1 SUSPENSION ORAL at 17:56

## 2020-08-15 RX ADMIN — AMLODIPINE BESYLATE 5 MG: 5 TABLET ORAL at 09:03

## 2020-08-15 RX ADMIN — DICYCLOMINE HYDROCHLORIDE 10 MG: 10 CAPSULE ORAL at 22:07

## 2020-08-15 RX ADMIN — DICYCLOMINE HYDROCHLORIDE 10 MG: 10 CAPSULE ORAL at 06:21

## 2020-08-15 RX ADMIN — ACETAMINOPHEN 650 MG: 325 TABLET, FILM COATED ORAL at 09:34

## 2020-08-15 RX ADMIN — DICYCLOMINE HYDROCHLORIDE 10 MG: 10 CAPSULE ORAL at 17:00

## 2020-08-15 RX ADMIN — DICYCLOMINE HYDROCHLORIDE 10 MG: 10 CAPSULE ORAL at 11:11

## 2020-08-15 RX ADMIN — SODIUM CHLORIDE 75 ML/HR: 0.9 INJECTION, SOLUTION INTRAVENOUS at 08:51

## 2020-08-15 RX ADMIN — ENOXAPARIN SODIUM 40 MG: 40 INJECTION SUBCUTANEOUS at 09:03

## 2020-08-15 RX ADMIN — SUCRALFATE 1000 MG: 1 SUSPENSION ORAL at 23:53

## 2020-08-15 RX ADMIN — PANTOPRAZOLE SODIUM 40 MG: 40 TABLET, DELAYED RELEASE ORAL at 06:21

## 2020-08-15 RX ADMIN — LEVOTHYROXINE SODIUM 75 MCG: 75 TABLET ORAL at 06:21

## 2020-08-15 RX ADMIN — PANTOPRAZOLE SODIUM 40 MG: 40 TABLET, DELAYED RELEASE ORAL at 17:00

## 2020-08-15 RX ADMIN — SUCRALFATE 1000 MG: 1 SUSPENSION ORAL at 06:21

## 2020-08-15 RX ADMIN — SUCRALFATE 1000 MG: 1 SUSPENSION ORAL at 11:11

## 2020-08-15 RX ADMIN — FAMOTIDINE 20 MG: 20 TABLET, FILM COATED ORAL at 11:00

## 2020-08-15 NOTE — PROGRESS NOTES
Progress Note - Leah Villanueva 1955, 76 y o  female MRN: [id number]    Unit/Bed#: S -01 Encounter: [id number]    Primary Care Provider: Dony Pandey DO   Date and time admitted to hospital: 8/14/2020  6:12 PM        * Abdominal pain  Assessment & Plan  · Patient with epigastric pain and the inability to eat for the past couple of days  · likley secondary to gerd   · DDX: include erosive esophagitis verses pill esophagitis versus malignancy verses hiatal hernia verses chest pain versus obstruction  · CT abdomen pelvis negative  · Patient started on Bentyl, Protonix 40 mg twice a day, Mylanta  She continues to have epigastric pain and bloating  · Will add Pepcid  · Patient has scheduled endoscopy and colonoscopy in September 29th with [facility]  However patient states she cannot wait that long due to significant discomfort  · Will consult inpatient Gastroenterology evaluate for endoscopy    Hypertensive urgency  Assessment & Plan  · Blood pressure noted to be elevated at greater than 575 systolic on admission which is now improved  · P r n  Hydralazine for elevated pressures greater than 597 systolic  · Would ideally not like to drop the blood pressure greater than 25%  · Continue home medications including amlodipine 5 mg daily,  · Hydrochlorothiazide 12 5 mg daily  · Monitor BP  GERD (gastroesophageal reflux disease)  Assessment & Plan  · Continue mylanta  · Continue PPI   · Pain likely related to esophagitis     Hypothyroidism  Assessment & Plan  · Continue synthroid     VTE Pharmacologic Prophylaxis:   Pharmacologic: Enoxaparin (Lovenox)  Mechanical VTE Prophylaxis in Place: Yes    Patient Centered Rounds: I have performed bedside rounds with nursing staff today      Current Length of Stay: 1 day(s)    Current Patient Status: Inpatient   Certification Statement: The patient will continue to require additional inpatient hospital stay due to Gastritis    Discharge Plan: Home when medically clear    Code Status: Level 1 - Full Code      Subjective:   Patient continues to have abdominal pain, bloating, distention in the epigastric area  Denies any fever or chills  Patient also had loose stool this morning    Objective:     Vitals:   Temp (24hrs), Av 1 °F (36 7 °C), Min:97 5 °F (36 4 °C), Max:98 8 °F (37 1 °C)    Temp:  [97 5 °F (36 4 °C)-98 8 °F (37 1 °C)] 97 5 °F (36 4 °C)  HR:  [60-78] 60  Resp:  [18] 18  BP: (120-185)/(65-80) 141/65  SpO2:  [96 %-98 %] 96 %  Body mass index is 29 68 kg/m²  Input and Output Summary (last 24 hours): Intake/Output Summary (Last 24 hours) at 8/15/2020 1001  Last data filed at 8/15/2020 0851  Gross per 24 hour   Intake 736 25 ml   Output --   Net 736 25 ml       Physical Exam:     Physical Exam  Constitutional:       Appearance: She is well-developed  HENT:      Head: Normocephalic and atraumatic  Neck:      Musculoskeletal: Normal range of motion  Pulmonary:      Effort: Pulmonary effort is normal  No respiratory distress  Breath sounds: Normal breath sounds  Abdominal:      Tenderness: There is abdominal tenderness  Comments: Epigastric tenderness   Skin:     General: Skin is warm and dry  Additional Data:     Labs:    Results from last 7 days   Lab Units 08/15/20  0620  1829   WBC Thousand/uL 6 22  --  7 70   HEMOGLOBIN g/dL 13 5  --  15 8*   HEMATOCRIT % 42 6  --  47 9*   PLATELETS Thousands/uL 232   < > 246   NEUTROS PCT %  --   --  59   LYMPHS PCT %  --   --  33   MONOS PCT %  --   --  5   EOS PCT %  --   --  1    < > = values in this interval not displayed       Results from last 7 days   Lab Units 08/15/20  0620  1829   SODIUM mmol/L 141 139   POTASSIUM mmol/L 3 9 3 9   CHLORIDE mmol/L 105 101   CO2 mmol/L 27 29   BUN mg/dL 13 12   CREATININE mg/dL 0 92 1 01   ANION GAP mmol/L 9 9   CALCIUM mg/dL 8 8 9 0   ALBUMIN g/dL  --  4 3   TOTAL BILIRUBIN mg/dL  --  0 78   ALK PHOS U/L  --  65 ALT U/L  --  31   AST U/L  --  15   GLUCOSE RANDOM mg/dL 95 88                 Results from last 7 days   Lab Units 08/14/20  1859   LACTIC ACID mmol/L 0 9           * I Have Reviewed All Lab Data Listed Above  * Additional Pertinent Lab Tests Reviewed: Lakeisha Lopez Admission Reviewed      Recent Cultures (last 7 days):           Last 24 Hours Medication List:   Current Facility-Administered Medications   Medication Dose Route Frequency Provider Last Rate    acetaminophen  650 mg Oral Q6H PRN Luke Lima PA-C      aluminum-magnesium hydroxide-simethicone  30 mL Oral Q4H PRN Luke Lima PA-C      amLODIPine  5 mg Oral Daily Luke Lima PA-C      dicyclomine  10 mg Oral 4x Daily (AC & HS) Luke Lima PA-C      enoxaparin  40 mg Subcutaneous Daily Luke Lima PA-C      famotidine  20 mg Oral Daily Kaylynn Wilkes MD      hydrALAZINE  10 mg Intravenous Q6H PRN Everrubia Montes PA-C      levothyroxine  75 mcg Oral Early Morning Luke Lima PA-C      meclizine  25 mg Oral TID PRN Everretphoebe Montes PA-C      pantoprazole  40 mg Oral BID AC Luke Lima PA-C      simethicone  80 mg Oral 4x Daily PRN Everretphoebe Montes PA-C      sodium chloride  75 mL/hr Intravenous Continuous Luke Lima PA-C 75 mL/hr (08/15/20 0851)    sucralfate  1,000 mg Oral Q6H [facility], [place]          Today, Patient Was Seen By: Kaylynn Wilkes MD    ** Please Note: Dictation voice to text software may have been used in the creation of this document   **

## 2020-08-15 NOTE — ASSESSMENT & PLAN NOTE
· Blood pressure noted to be elevated at greater than 910 systolic  · P r n  Hydralazine for elevated pressures greater than 627 systolic  · Would ideally not like to drop the blood pressure greater than 25%  · Continue home medications including amlodipine 5 mg daily,  · Hydrochlorothiazide 12 5 mg daily  · Monitor BP

## 2020-08-15 NOTE — UTILIZATION REVIEW
Initial Clinical Review    Admission: Date/Time/Statement:   Admission Orders (From admission, onward)     Ordered        08/14/20 2034  Inpatient Admission  Once                   Orders Placed This Encounter   Procedures    Inpatient Admission     Standing Status:   Standing     Number of Occurrences:   1     Order Specific Question:   Admitting Physician     Answer:   Dilan Jj [[id number]]     Order Specific Question:   Level of Care     Answer:   Med Surg [[id number]]     Order Specific Question:   Estimated length of stay     Answer:   More than 2 Midnights     Order Specific Question:   Certification     Answer:   I certify that inpatient services are medically necessary for this patient for a duration of greater than two midnights  See H&P and MD Progress Notes for additional information about the patient's course of treatment  ED Arrival Information     Expected Arrival Acuity Means of Arrival Escorted By Service Admission Type    - 8/14/2020 18:04 Urgent Walk-In Family Member General Medicine Urgent    Arrival Complaint    ABDOMINAL PAIN        Chief Complaint   Patient presents with    Abdominal Pain     pt woke up with upper abdominal pain and bloating this morning, vomittingx1  also c/o soft stoolsx3 months     Assessment/Plan: this is a 76year old female from home to ED admitted to inpatient due to abdominal pain/hypertensive urgency  History of GERD, hypothyroid, hpt  Presented due to worsening upper abdominal pain over last several months with bloating, nausea and intractable vomiting  Seen in ED twice in July for similar  Is unable to tolerate po for last couple of days     On exam abdominal tenderness in RUQ and epigastric area  Hypertensive  H&H 15 8/47  9  CT abdomen without acute findings, is status post cholecystectomy without duct dilatation  In the ED given mylicon, tylenol and Pepcid  Plan is BP control, IVF, pain control with bentyl, Mylanta,aquaK, PPI  GI consulted       On 8/15/2020: patient with continued abdominal pain and bloating, today with loose stool  On exam abdominal distention, epigastric and abdominal tenderness  IVF continue  Pepcid added  Per GI on 8/15/2020:  Patient with epigastric and abdominal pain with bloating with differential of gastritis vs peptic ulcer disease vs gastroparesis vs functional  Plan is increase PPI to BID, add Carafate, no NSAIDS  For EGD on 8/17/2020  ED Triage Vitals   Temperature Pulse Respirations Blood Pressure SpO2   08/14/20 1817 08/14/20 1815 08/14/20 1815 08/14/20 1815 08/14/20 1815   98 8 °F (37 1 °C) 78 18 (!) 185/80 98 %      Temp Source Heart Rate Source Patient Position - Orthostatic VS BP Location FiO2 (%)   08/14/20 1817 08/14/20 1815 08/14/20 1815 08/14/20 1815 --   Oral Monitor Sitting Right arm       Pain Score       08/14/20 2143       6          Wt Readings from Last 1 Encounters:   08/14/20 73 6 kg (162 lb 4 1 oz)     Additional Vital Signs:   08/15/20 0630   97 5 °F (36 4 °C)   60   18   141/65   96 %   None (Room air)   Lying    08/14/20 2149   98 1 °F (36 7 °C)   60   18   120/68   98 %   None (Room air)   Lying    08/14/20 2120   --   70   18   137/65   97 %   None (Room air)          Pertinent Labs/Diagnostic Test Results:   8/14/2020 CT abdomen No significant interval change since the prior examination  No discrete peripancreatic inflammatory changes seen   No evidence for bowel obstruction  Status post cholecystectomy without evidence for biliary ductal dilatation     Colonic diverticulosis without evidence for diverticulitis      Results from last 7 days   Lab Units 08/15/20  0623 08/15/20  0021 08/14/20  1829   WBC Thousand/uL 6 22  --  7 70   HEMOGLOBIN g/dL 13 5  --  15 8*   HEMATOCRIT % 42 6  --  47 9*   PLATELETS Thousands/uL 232 219 246   NEUTROS ABS Thousands/µL  --   --  4 63       Results from last 7 days   Lab Units 08/15/20  0623 08/14/20  1829   SODIUM mmol/L 141 139   POTASSIUM mmol/L 3 9 3 9 CHLORIDE mmol/L 105 101   CO2 mmol/L 27 29   ANION GAP mmol/L 9 9   BUN mg/dL 13 12   CREATININE mg/dL 0 92 1 01   EGFR ml/min/1 73sq m 65 58   CALCIUM mg/dL 8 8 9 0     Results from last 7 days   Lab Units 08/14/20  1829   AST U/L 15   ALT U/L 31   ALK PHOS U/L 65   TOTAL PROTEIN g/dL 8 3*   ALBUMIN g/dL 4 3   TOTAL BILIRUBIN mg/dL 0 78     Results from last 7 days   Lab Units 08/15/20  0623 08/14/20  1829   GLUCOSE RANDOM mg/dL 95 88     Results from last 7 days   Lab Units 08/15/20  0021 08/14/20  1829   TROPONIN I ng/mL <0 02 <0 02     Results from last 7 days   Lab Units 08/14/20  1859   LACTIC ACID mmol/L 0 9     Results from last 7 days   Lab Units 08/14/20  1829   LIPASE u/L 118     ED Treatment:   Medication Administration from 08/14/2020 1804 to 08/14/2020 2137       Date/Time Order Dose Route Action Comments     08/14/2020 1859 famotidine (PEPCID) tablet 20 mg 20 mg Oral Given      08/14/2020 3228 simethicone (MYLICON) chewable tablet 80 mg 80 mg Oral Given      08/14/2020 1859 acetaminophen (TYLENOL) tablet 975 mg 975 mg Oral Given         Past Medical History:   Diagnosis Date    Disease of thyroid gland     Vertigo      Present on Admission:   Hypothyroidism   Hypertensive urgency   GERD (gastroesophageal reflux disease)      Admitting Diagnosis: Epigastric pain [R10 13]  Abdominal pain [R10 9]  Nausea and vomiting [R11 2]  Age/Sex: 76 y o  female  Admission Orders: 8/14/2020 2034 inpatient   Scheduled Medications:  amLODIPine, 5 mg, Oral, Daily  dicyclomine, 10 mg, Oral, 4x Daily (AC & HS)  enoxaparin, 40 mg, Subcutaneous, Daily  famotidine, 20 mg, Oral, Daily  levothyroxine, 75 mcg, Oral, Early Morning  pantoprazole, 40 mg, Oral, BID AC  sucralfate, 1,000 mg, Oral, Q6H NANETTE  famotidine (PEPCID) tablet 20 mg    Dose: 20 mg  Freq: Daily Route: PO  Start: 08/15/20 1000     Continuous IV Infusions:  sodium chloride, 75 mL/hr, Intravenous, Continuous      PRN Meds:  acetaminophen, 650 mg, Oral, Q6H PRN - used x 1   aluminum-magnesium hydroxide-simethicone, 30 mL, Oral, Q4H PRN  hydrALAZINE, 10 mg, Intravenous, Q6H PRN  meclizine, 25 mg, Oral, TID PRN  simethicone, 80 mg, Oral, 4x Daily PRN    IP CONSULT TO GASTROENTEROLOGY    Network Utilization Review Department  [email or web address]  ATTENTION: Please call with any questions or concerns to [phone] and carefully listen to the prompts so that you are directed to the right person  All voicemails are confidential   Latrell Delude all requests for admission clinical reviews, approved or denied determinations and any other requests to dedicated fax number below belonging to the campus where the patient is receiving treatment   List of dedicated fax numbers for the Facilities:  [address] DENIALS (Administrative/Medical Necessity) [phone]   [address] (Maternity/NICU/Pediatrics) [phone]   Ana Paula Blackman [phone]   Gabriela Kirkpatrick [phone]   [facility]  [phone]   Juan Miguel Daniel [phone]   [address] [place] [address] [phone]   [facility]  [phone]   [address] [facility]  [address] [facility] [address] [facility] [phone]

## 2020-08-15 NOTE — ASSESSMENT & PLAN NOTE
· Patient with epigastric pain and the inability to eat for the past couple of days  · likley secondary to gerd   · DDX: include erosive esophagitis verses pill esophagitis versus malignancy verses hiatal hernia verses chest pain versus obstruction  · CT abdomen pelvis negative  · Patient started on Bentyl, Protonix 40 mg twice a day, Mylanta  She continues to have epigastric pain and bloating  · Will add Pepcid  · Patient has scheduled endoscopy and colonoscopy in September 29th with [facility]    However patient states she cannot wait that long due to significant discomfort  · Will consult inpatient Gastroenterology evaluate for endoscopy

## 2020-08-15 NOTE — H&P
H&P- Hemalatha Calderon 1955, 76 y o  female MRN: [id number]  Unit/Bed#: S -01 Encounter: [id number]  Primary Care Provider: Cleaster Cowden, DO   Date and time admitted to hospital: 8/14/2020  6:12 PM    * Abdominal pain  Assessment & Plan  · Patient with epigastric pain and the inability to eat for the past couple of days  · Bentyl, mylanta, mylicon, aqua k, PPI   · likley secondary to gerd   · DDX: include erosive esophagitis verses pill esophagitis versus malignancy verses hiatal hernia verses chest pain versus obstruction  · CT abdomen pelvis negative    Hypertensive urgency  Assessment & Plan  · Blood pressure noted to be elevated at greater than 935 systolic  · P r n  Hydralazine for elevated pressures greater than 569 systolic  · Would ideally not like to drop the blood pressure greater than 25%  · Continue home medications including amlodipine 5 mg daily,  · Hydrochlorothiazide 12 5 mg daily  · Monitor BP  GERD (gastroesophageal reflux disease)  Assessment & Plan  · Continue mylanta  · Continue PPI   · Pain likely related to esophagitis     Hypothyroidism  Assessment & Plan  · Continue synthroid       VTE Prophylaxis: Enoxaparin (Lovenox)  / sequential compression device   Code Status:  Full code  POLST: There is no POLST form on file for this patient (pre-hospital)  Discussion with family:  Patient with daughter at bedside    Anticipated Length of Stay:  Patient will be admitted on an Inpatient basis with an anticipated length of stay of  > 2 midnights  Justification for Hospital Stay: abdominal pain     Total Time for Visit, including Counseling / Coordination of Care: 1 hour  Greater than 50% of this total time spent on direct patient counseling and coordination of care  Chief Complaint:   Abdominal pain    History of Present Illness:    Hemalatha Calderon is a 76 y o  female who presents with abdominal pain  She has past medical history significant for hypothyroidism, hypertension    She presents the emergency department for 1 day history of abdominal pain  According to daughter patient has been dealing with intermittent on and off abdominal pain in this fashion for the past 4 years  She states that she has undergone colonoscopy, and EGD in 2016 at [facility] which was normal   She states that her abdominal pain began acutely today and resultant 1 episode of emesis  She denies any worsening with food but states that she has been unable to eat properly  She denies any hematemesis, bloody stools, or dark stools  She denies any recent NSAID intake  She does state that she has felt somewhat bloated as result of this  When asked to describe her pain as she describes it as a 6/10 knowing pain intermittent in nature occurring in her epigastric reason  She denies any urinary symptoms or back pain at this time  She denies any chest pain, shortness of breath, or diaphoresis  Initial troponin the ED was negative    Review of Systems:    Review of Systems   Constitutional: Positive for appetite change  Negative for chills, diaphoresis, fatigue, fever and unexpected weight change  Respiratory: Negative for cough, chest tightness and shortness of breath  Cardiovascular: Negative for chest pain and palpitations  Gastrointestinal: Positive for abdominal distention, abdominal pain, nausea and vomiting  Negative for blood in stool and diarrhea  Genitourinary: Negative for decreased urine volume, difficulty urinating, dysuria, frequency and urgency  Musculoskeletal: Negative for arthralgias  Neurological: Negative for dizziness, syncope, light-headedness and headaches  All other systems reviewed and are negative        Past Medical and Surgical History:     Past Medical History:   Diagnosis Date    Disease of thyroid gland     Vertigo        Past Surgical History:   Procedure Laterality Date    GALLBLADDER SURGERY      OOPHORECTOMY         Meds/Allergies:    Prior to Admission medications    Medication Sig Start Date End Date Taking? Authorizing Provider   aluminum-magnesium hydroxide-simethicone (MYLANTA) 200-200-20 mg/5 mL suspension Take 30 mL by mouth every 4 (four) hours as needed for indigestion or heartburn 7/6/20   Melo Yuliana, DO   amLODIPine (NORVASC) 5 mg tablet Take 1 tablet (5 mg total) by mouth daily 7/6/20   [facility], DO   betamethasone, augmented, (DIPROLENE-AF) 0 05 % cream Apply topically 2 (two) times a day 3/2/20   Gt Stephens MD   hydrochlorothiazide (HYDRODIURIL) 12 5 mg tablet Take 1 tablet (12 5 mg total) by mouth daily 7/6/20   [facility], DO   levothyroxine 75 mcg tablet Take 75 mcg by mouth daily 11/15/19   Historical Provider, MD   meclizine (ANTIVERT) 25 mg tablet Take 1 tablet (25 mg total) by mouth 3 (three) times a day as needed for dizziness 7/6/20   Kit Leger PA-C   pantoprazole (PROTONIX) 40 mg tablet Take 1 tablet (40 mg total) by mouth 2 (two) times a day for 10 days 7/6/20 7/16/20  [facility], DO   sucralfate (CARAFATE) 1 g/10 mL suspension Take 10 mL (1,000 mg total) by mouth every 6 (six) hours 7/6/20   [facility]     I have reviewed home medications with patient personally  Allergies:    Allergies   Allergen Reactions    Sulfa Antibiotics Nausea Only    Medical Tape Rash       Social History:     Marital Status: /Civil Union   Occupation: unknown   Patient Pre-hospital Living Situation: lives at home   Patient Pre-hospital Level of Mobility: ambulates   Patient Pre-hospital Diet Restrictions: low residue   Substance Use History:   Social History     Substance and Sexual Activity   Alcohol Use Not Currently    Frequency: Never     Social History     Tobacco Use   Smoking Status Never Smoker   Smokeless Tobacco Never Used     Social History     Substance and Sexual Activity   Drug Use Never       Family History:    Family History   Problem Relation Age of Onset    Squamous cell carcinoma Daughter Physical Exam:     Vitals:   Blood Pressure: 137/65 (08/14/20 2120)  Pulse: 70 (08/14/20 2120)  Temperature: 98 8 °F (37 1 °C) (08/14/20 1817)  Temp Source: Oral (08/14/20 1817)  Respirations: 18 (08/14/20 2120)  SpO2: 97 % (08/14/20 2120)    Physical Exam  Constitutional:       General: She is not in acute distress  HENT:      Head: Normocephalic and atraumatic  Mouth/Throat:      Mouth: Mucous membranes are moist       Pharynx: Oropharynx is clear  No oropharyngeal exudate  Eyes:      General:         Right eye: No discharge  Left eye: No discharge  Conjunctiva/sclera: Conjunctivae normal       Pupils: Pupils are equal, round, and reactive to light  Neck:      Musculoskeletal: Neck supple  No muscular tenderness  Cardiovascular:      Rate and Rhythm: Normal rate and regular rhythm  Pulses: Normal pulses  Heart sounds: Normal heart sounds  No murmur  Pulmonary:      Effort: Pulmonary effort is normal  No respiratory distress  Breath sounds: Normal breath sounds  No wheezing or rales  Abdominal:      General: Abdomen is flat  There is no distension  Palpations: Abdomen is soft  There is no mass  Tenderness: There is abdominal tenderness in the right upper quadrant and epigastric area  There is no right CVA tenderness or left CVA tenderness  Musculoskeletal: Normal range of motion  Right lower leg: No edema  Left lower leg: No edema  Skin:     General: Skin is warm and dry  Capillary Refill: Capillary refill takes less than 2 seconds  Coloration: Skin is not jaundiced  Neurological:      General: No focal deficit present  Mental Status: She is alert and oriented to person, place, and time  Cranial Nerves: No cranial nerve deficit  Psychiatric:         Mood and Affect: Mood normal            Additional Data:     Lab Results: I have personally reviewed pertinent reports        Results from last 7 days   Lab Units 08/14/20  1829   WBC Thousand/uL 7 70   HEMOGLOBIN g/dL 15 8*   HEMATOCRIT % 47 9*   PLATELETS Thousands/uL 246   NEUTROS PCT % 59   LYMPHS PCT % 33   MONOS PCT % 5   EOS PCT % 1     Results from last 7 days   Lab Units 08/14/20  1829   SODIUM mmol/L 139   POTASSIUM mmol/L 3 9   CHLORIDE mmol/L 101   CO2 mmol/L 29   BUN mg/dL 12   CREATININE mg/dL 1 01   ANION GAP mmol/L 9   CALCIUM mg/dL 9 0   ALBUMIN g/dL 4 3   TOTAL BILIRUBIN mg/dL 0 78   ALK PHOS U/L 65   ALT U/L 31   AST U/L 15   GLUCOSE RANDOM mg/dL 88                 Results from last 7 days   Lab Units 08/14/20  1859   LACTIC ACID mmol/L 0 9       Imaging: I have personally reviewed pertinent reports  CT abdomen pelvis with contrast   Final Result by Salvador Zee MD (08/14 2009)      No significant interval change since the prior examination  No discrete peripancreatic inflammatory changes seen  No evidence for bowel obstruction  Status post cholecystectomy without evidence for biliary ductal dilatation  Colonic diverticulosis without evidence for diverticulitis  Workstation performed: [id number]             EKG, Pathology, and Other Studies Reviewed on Admission:   · All reports viewed in [email or web address] Records Reviewed: Yes     ** Please Note: This note has been constructed using a voice recognition system   **

## 2020-08-15 NOTE — PLAN OF CARE
Problem: PAIN - ADULT  Goal: Verbalizes/displays adequate comfort level or baseline comfort level  Description: Interventions:  - Encourage patient to monitor pain and request assistance  - Assess pain using appropriate pain scale  - Administer analgesics based on type and severity of pain and evaluate response  - Implement non-pharmacological measures as appropriate and evaluate response  - Consider cultural and social influences on pain and pain management  - Notify physician/advanced practitioner if interventions unsuccessful or patient reports new pain  Outcome: Progressing     Problem: SAFETY ADULT  Goal: Patient will remain free of falls  Description: INTERVENTIONS:  - Assess patient frequently for physical needs  -  Identify cognitive and physical deficits and behaviors that affect risk of falls    -  [place] fall precautions as indicated by assessment   - Educate patient/family on patient safety including physical limitations  - Instruct patient to call for assistance with activity based on assessment  - Modify environment to reduce risk of injury  - Consider OT/PT consult to assist with strengthening/mobility  Outcome: Progressing

## 2020-08-15 NOTE — ED PROVIDER NOTES
History  Chief Complaint   Patient presents with    Abdominal Pain     pt woke up with upper abdominal pain and bloating this morning, vomittingx1  also c/o soft stoolsx3 months     HPI  67-year-old female with past medical history significant for hypothyroidism, hypertension presenting to the emergency department with several months of increasing upper abdominal pain associated with bloating, distention, nausea, intractable vomiting  Patient is unable to describe her pain, but describes it as a sharp pain worse with eating  Patient was seen in the emergency department 2 times in July for similar pain, was admitted for chest pain/ACS workup, received a CT angio of chest abdomen pelvis to rule out dissection  Patient was told to follow up with GI for possible endoscopy, however patient is unable to get an appointment until September  Patient states that she is unable to tolerate anything p o  And constantly vomits  Denies a headache, chills, night sweats, vision changes, headache, neck pain, cough, sore throat, nausea, vomiting, urinary symptoms, changes in stool, leg pain or leg swelling, rash  No sick contacts, no recent travel  Has history of gallbladder removal   Last bowel movement was earlier today  Stools have been very soft  No recent antibiotics  No one else in the household is experiencing similar symptoms  Prior to Admission Medications   Prescriptions Last Dose Informant Patient Reported?  Taking?   aluminum-magnesium hydroxide-simethicone (MYLANTA) 200-200-20 mg/5 mL suspension   No No   Sig: Take 30 mL by mouth every 4 (four) hours as needed for indigestion or heartburn   amLODIPine (NORVASC) 5 mg tablet   No No   Sig: Take 1 tablet (5 mg total) by mouth daily   betamethasone, augmented, (DIPROLENE-AF) 0 05 % cream   No No   Sig: Apply topically 2 (two) times a day   hydrochlorothiazide (HYDRODIURIL) 12 5 mg tablet   No No   Sig: Take 1 tablet (12 5 mg total) by mouth daily   levothyroxine 75 mcg tablet   Yes No   Sig: Take 75 mcg by mouth daily   meclizine (ANTIVERT) 25 mg tablet   No No   Sig: Take 1 tablet (25 mg total) by mouth 3 (three) times a day as needed for dizziness   pantoprazole (PROTONIX) 40 mg tablet   No No   Sig: Take 1 tablet (40 mg total) by mouth 2 (two) times a day for 10 days   sucralfate (CARAFATE) 1 g/10 mL suspension   No No   Sig: Take 10 mL (1,000 mg total) by mouth every 6 (six) hours      Facility-Administered Medications: None       Past Medical History:   Diagnosis Date    Disease of thyroid gland     Vertigo        Past Surgical History:   Procedure Laterality Date    GALLBLADDER SURGERY      OOPHORECTOMY         Family History   Problem Relation Age of Onset    Squamous cell carcinoma Daughter      I have reviewed and agree with the history as documented  E-Cigarette/Vaping    E-Cigarette Use Never User      E-Cigarette/Vaping Substances     Social History     Tobacco Use    Smoking status: Never Smoker    Smokeless tobacco: Never Used   Substance Use Topics    Alcohol use: Not Currently     Frequency: Never    Drug use: Never       Review of Systems   Constitutional: Negative for chills and fever  Respiratory: Negative for apnea, cough, choking, chest tightness, shortness of breath, wheezing and stridor  Cardiovascular: Negative for chest pain and leg swelling  Gastrointestinal: Positive for abdominal distention, abdominal pain, diarrhea, nausea and vomiting  Negative for constipation and rectal pain  Genitourinary: Negative for dysuria and hematuria  Musculoskeletal: Negative for back pain, gait problem and neck pain  Skin: Negative for color change, pallor, rash and wound  Neurological: Negative for dizziness, tremors, seizures, syncope, facial asymmetry, speech difficulty, weakness, light-headedness, numbness and headaches  Physical Exam  Physical Exam  Vitals signs and nursing note reviewed     Constitutional:       General: She is not in acute distress  Appearance: She is well-developed  She is not diaphoretic  HENT:      Head: Normocephalic and atraumatic  Right Ear: External ear normal       Left Ear: External ear normal       Nose: Nose normal    Eyes:      General:         Right eye: No discharge  Left eye: No discharge  Conjunctiva/sclera: Conjunctivae normal       Pupils: Pupils are equal, round, and reactive to light  Neck:      Musculoskeletal: Normal range of motion and neck supple  Cardiovascular:      Rate and Rhythm: Normal rate and regular rhythm  Heart sounds: Normal heart sounds  No murmur  No friction rub  No gallop  Pulmonary:      Effort: Pulmonary effort is normal  No respiratory distress  Breath sounds: Normal breath sounds  No stridor  No wheezing or rales  Chest:      Chest wall: No tenderness  Abdominal:      General: Bowel sounds are normal  There is no distension  Palpations: Abdomen is soft  There is no mass  Tenderness: There is abdominal tenderness in the right upper quadrant and epigastric area  There is no guarding or rebound  Hernia: No hernia is present  Musculoskeletal: Normal range of motion  General: No tenderness or deformity  Skin:     General: Skin is warm and dry  Capillary Refill: Capillary refill takes less than 2 seconds  Neurological:      Mental Status: She is alert and oriented to person, place, and time           Vital Signs  ED Triage Vitals   Temperature Pulse Respirations Blood Pressure SpO2   08/14/20 1817 08/14/20 1815 08/14/20 1815 08/14/20 1815 08/14/20 1815   98 8 °F (37 1 °C) 78 18 (!) 185/80 98 %      Temp Source Heart Rate Source Patient Position - Orthostatic VS BP Location FiO2 (%)   08/14/20 1817 08/14/20 1815 08/14/20 1815 08/14/20 1815 --   Oral Monitor Sitting Right arm       Pain Score       --                  Vitals:    08/14/20 1815   BP: (!) 185/80   Pulse: 78   Patient Position - Orthostatic VS: Sitting         Visual Acuity      ED Medications  Medications   famotidine (PEPCID) tablet 20 mg (20 mg Oral Given 8/14/20 1859)   simethicone (MYLICON) chewable tablet 80 mg (80 mg Oral Given 8/14/20 1859)   acetaminophen (TYLENOL) tablet 975 mg (975 mg Oral Given 8/14/20 1859)   iohexol (OMNIPAQUE) 350 MG/ML injection (MULTI-DOSE) 100 mL (100 mL Intravenous Given 8/14/20 1928)       Diagnostic Studies  Results Reviewed     Procedure Component Value Units Date/Time    Lactic acid, plasma [[id number]]  (Normal) Collected:  08/14/20 1859    Lab Status:  Final result Specimen:  Blood from Arm, Right Updated:  08/14/20 1939     LACTIC ACID 0 9 mmol/L     Narrative:       Result may be elevated if tourniquet was used during collection      Troponin I [[id number]]  (Normal) Collected:  08/14/20 1829    Lab Status:  Final result Specimen:  Blood from Arm, Right Updated:  08/14/20 1909     Troponin I <0 02 ng/mL     Comprehensive metabolic panel [[id number]]  (Abnormal) Collected:  08/14/20 1829    Lab Status:  Final result Specimen:  Blood from Arm, Right Updated:  08/14/20 1907     Sodium 139 mmol/L      Potassium 3 9 mmol/L      Chloride 101 mmol/L      CO2 29 mmol/L      ANION GAP 9 mmol/L      BUN 12 mg/dL      Creatinine 1 01 mg/dL      Glucose 88 mg/dL      Calcium 9 0 mg/dL      AST 15 U/L      ALT 31 U/L      Alkaline Phosphatase 65 U/L      Total Protein 8 3 g/dL      Albumin 4 3 g/dL      Total Bilirubin 0 78 mg/dL      eGFR 58 ml/min/1 73sq m     Narrative:       [place] guidelines for Chronic Kidney Disease (CKD):     Stage 1 with normal or high GFR (GFR > 90 mL/min/1 73 square meters)    Stage 2 Mild CKD (GFR = 60-89 mL/min/1 73 square meters)    Stage 3A Moderate CKD (GFR = 45-59 mL/min/1 73 square meters)    Stage 3B Moderate CKD (GFR = 30-44 mL/min/1 73 square meters)    Stage 4 Severe CKD (GFR = 15-29 mL/min/1 73 square meters)    Stage 5 End Stage CKD (GFR <15 mL/min/1 73 square meters)  Note: GFR calculation is accurate only with a steady state creatinine    Lipase [[id number]]  (Normal) Collected:  08/14/20 1829    Lab Status:  Final result Specimen:  Blood from Arm, Right Updated:  08/14/20 1907     Lipase 118 u/L     CBC and differential [[id number]]  (Abnormal) Collected:  08/14/20 1829    Lab Status:  Final result Specimen:  Blood from Arm, Right Updated:  08/14/20 1838     WBC 7 70 Thousand/uL      RBC 4 99 Million/uL      Hemoglobin 15 8 g/dL      Hematocrit 47 9 %      MCV 96 fL      MCH 31 7 pg      MCHC 33 0 g/dL      RDW 12 7 %      MPV 9 7 fL      Platelets 222 Thousands/uL      nRBC 0 /100 WBCs      Neutrophils Relative 59 %      Immat GRANS % 1 %      Lymphocytes Relative 33 %      Monocytes Relative 5 %      Eosinophils Relative 1 %      Basophils Relative 1 %      Neutrophils Absolute 4 63 Thousands/µL      Immature Grans Absolute 0 04 Thousand/uL      Lymphocytes Absolute 2 56 Thousands/µL      Monocytes Absolute 0 38 Thousand/µL      Eosinophils Absolute 0 05 Thousand/µL      Basophils Absolute 0 04 Thousands/µL                  CT abdomen pelvis with contrast   Final Result by Anita Callejas MD (08/14 2009)      No significant interval change since the prior examination  No discrete peripancreatic inflammatory changes seen  No evidence for bowel obstruction  Status post cholecystectomy without evidence for biliary ductal dilatation  Colonic diverticulosis without evidence for diverticulitis  Workstation performed: [id number]                    Procedures  Procedures         ED Course  ED Course as of Aug 14 2113   Fri Aug 14, 2020   1847 Vital signs upon arrival notable for hypertension  Physical exam is remarkable for epigastric and right upper quadrant tenderness  Blood Pressure(!): 185/80   1847 Differential diagnosis includes gastritis, reflux, peptic ulcer disease, pancreatitis, hepatitis, esophageal disease, mass, bowel obstruction  Lab work and imaging ordered  1911 Troponin I: <0 02   1931 Lab work including CBC, CMP, lipase, troponin all grossly unremarkable  Lactic acid pending  1950 LACTIC ACID: 0 9   2011 No significant interval change since the prior examination      No discrete peripancreatic inflammatory changes seen  No evidence for bowel obstruction      Status post cholecystectomy without evidence for biliary ductal dilatation      Colonic diverticulosis without evidence for diverticulitis  CT abdomen pelvis with contrast   2029 Upon reassessment, patient continues to have abdominal distention and abdominal pain  After discussion with patient and daughter, and patient has difficulty getting a GI consult, we recommend admission  Case discussed with General Medicine for admission for intractable abdominal pain, nausea, and GI consult  SOFIA/DAST-10      Most Recent Value   How many times in the past year have you    Used an illegal drug or used a prescription medication for non-medical reasons? Never Filed at: 08/14/2020 2101                                MDM      Disposition  Final diagnoses:   Epigastric pain   Nausea and vomiting     Time reflects when diagnosis was documented in both MDM as applicable and the Disposition within this note     Time User Action Codes Description Comment    8/14/2020  9:10 PM Georgia Player Add [R10 13] Epigastric pain     8/14/2020  9:10 PM Georgia Player Add [R11 2] Nausea and vomiting       ED Disposition     ED Disposition Condition Date/Time Comment    Admit Stable Fri Aug 14, 2020  8:34 PM Case was discussed with DOMINGA Hernandez and the patient's admission status was agreed to be Admission Status: inpatient status to the service of Dr Akira Cardoza  Follow-up Information    None         Patient's Medications   Discharge Prescriptions    No medications on file     No discharge procedures on file      PDMP Review     None          ED Provider  Electronically Signed by           Marga Wakefield MD  08/14/20 6844

## 2020-08-15 NOTE — ASSESSMENT & PLAN NOTE
· Patient with epigastric pain and the inability to eat for the past couple of days  · Bentyl, mylanta, mylicon, aqua k, PPI   · likley secondary to gerd   · DDX: include erosive esophagitis verses pill esophagitis versus malignancy verses hiatal hernia verses chest pain versus obstruction  · CT abdomen pelvis negative

## 2020-08-15 NOTE — CONSULTS
Consultation - [facility]  Naresh Carrasco 76 y o  female MRN: [id number]  Unit/Bed#: S -01 Encounter: [id number]        Consults    ASSESSMENT/ PLAN:    79-year-old female with a past medical history of hypothyroidism, hypertension presents to the emergency room department with recurrent epigastric abdominal pain associated with nausea, vomiting and bloating    1  Abdominal pain  2  Nausea/vomiting  3  Bloating:  She admits to epigastric discomfort associated with bloating as well as nausea and vomiting  She is unsure if this is related to food  She did have a CT scan that was negative for acute abnormality in the abdomen or pelvis  Her CBC and CMP are grossly normal   Her hemoglobin is stable  She does have history of prior cholecystectomy  This could be secondary to esophagitis, gastritis, H pylori, celiac disease  Would recommend EGD with gastric and small-bowel biopsies for further evaluation  We can plan for EGD on Monday  -diet as tolerated  -continue PPI, Pepcid p r n   -continue Carafate 3-4 times daily  -NPO after midnight Monday  -plan for EGD Monday for further evaluation with gastric and small-bowel biopsies    Reason for Consult / Principal Problem:  Abdominal pain    HPI: Naresh Carrasco is a 79-year-old female with past medical history of hypothyroidism, hypertension who presented to the emergency room with epigastric abdominal pain associated with bloating, nausea and vomiting  She states that this has been present in ongoing but intermittent for the past few months  GI last evaluated her 7/6 for similar symptoms  She has been evaluated at [facility] the past for this with EGD and colonoscopy in 2016, which she thinks for both normal   She denies any hematemesis, melena, hematochezia, NSAID use  Her main complaint is bloating and epigastric discomfort    She did have a CT scan of the abdomen and pelvis with contrast in the emergency room that revealed no significant change since prior examination, no peripancreatic inflammatory changes, no bowel obstruction  There was evidence of her prior cholecystectomy without biliary ductal dilatation  As well as colonic diverticulosis without diverticulitis  Her CBC and CMP are also grossly normal     REVIEW OF SYSTEMS:     CONSTITUTIONAL: Denies any fever, chills, or rigors  Good appetite, and no recent weight loss  HEENT: No earache or tinnitus  Denies hearing loss or visual disturbances  CARDIOVASCULAR: No chest pain or palpitations  RESPIRATORY: Denies any cough, hemoptysis, shortness of breath or dyspnea on exertion  GASTROINTESTINAL: As noted in the History of Present Illness  GENITOURINARY: No problems with urination  Denies any hematuria or dysuria  NEUROLOGIC: No dizziness or vertigo, denies headaches  MUSCULOSKELETAL: Denies any muscle or joint pain  SKIN: Denies skin rashes or itching  ENDOCRINE: Denies excessive thirst  Denies intolerance to heat or cold  PSYCHOSOCIAL: Denies depression or anxiety  Denies any recent memory loss         Historical Information   Past Medical History:   Diagnosis Date    Disease of thyroid gland     Vertigo      Past Surgical History:   Procedure Laterality Date    GALLBLADDER SURGERY      OOPHORECTOMY       Social History   Social History     Substance and Sexual Activity   Alcohol Use Not Currently    Frequency: Never     Social History     Substance and Sexual Activity   Drug Use Never     Social History     Tobacco Use   Smoking Status Never Smoker   Smokeless Tobacco Never Used     Family History   Problem Relation Age of Onset    Squamous cell carcinoma Daughter        Meds/Allergies     Medications Prior to Admission   Medication    amLODIPine (NORVASC) 5 mg tablet    hydrochlorothiazide (HYDRODIURIL) 12 5 mg tablet    levothyroxine 75 mcg tablet    sucralfate (CARAFATE) 1 g/10 mL suspension    aluminum-magnesium hydroxide-simethicone (MYLANTA) 200-200-20 mg/5 mL suspension    betamethasone, augmented, (DIPROLENE-AF) 0 05 % cream    meclizine (ANTIVERT) 25 mg tablet    pantoprazole (PROTONIX) 40 mg tablet     Current Facility-Administered Medications   Medication Dose Route Frequency    acetaminophen (TYLENOL) tablet 650 mg  650 mg Oral Q6H PRN    aluminum-magnesium hydroxide-simethicone (MYLANTA) 200-200-20 mg/5 mL oral suspension 30 mL  30 mL Oral Q4H PRN    amLODIPine (NORVASC) tablet 5 mg  5 mg Oral Daily    dicyclomine (BENTYL) capsule 10 mg  10 mg Oral 4x Daily (AC & HS)    enoxaparin (LOVENOX) subcutaneous injection 40 mg  40 mg Subcutaneous Daily    famotidine (PEPCID) tablet 20 mg  20 mg Oral Daily    hydrALAZINE (APRESOLINE) injection 10 mg  10 mg Intravenous Q6H PRN    levothyroxine tablet 75 mcg  75 mcg Oral Early Morning    meclizine (ANTIVERT) tablet 25 mg  25 mg Oral TID PRN    pantoprazole (PROTONIX) EC tablet 40 mg  40 mg Oral BID AC    simethicone (MYLICON) chewable tablet 80 mg  80 mg Oral 4x Daily PRN    sodium chloride 0 9 % infusion  75 mL/hr Intravenous Continuous    sucralfate (CARAFATE) oral suspension 1,000 mg  1,000 mg Oral Q6H NANETTE       Allergies   Allergen Reactions    Sulfa Antibiotics Nausea Only    Medical Tape Rash           Objective     Blood pressure 141/65, pulse 60, temperature 97 5 °F (36 4 °C), temperature source Oral, resp  rate 18, height 5' 2" (1 575 m), weight 73 6 kg (162 lb 4 1 oz), SpO2 96 %  Intake/Output Summary (Last 24 hours) at 8/15/2020 1225  Last data filed at 8/15/2020 0368  Gross per 24 hour   Intake 736 25 ml   Output --   Net 736 25 ml         PHYSICAL EXAM:      General Appearance:   A&Ox3, cooperative, no distress, appears stated age    HEENT:   Normocephalic, atraumatic      Neck:  Supple, symmetrical, trachea midline   Lungs:   Clear to auscultation bilaterally; no rales, rhonchi or wheezing    Heart[de-identified]   S1 and S2 normal; regular rate and rhythm; no murmur, rub, or gallop  Abdomen:   Soft, non-tender, non-distended; normal bowel sounds; no masses, no organomegaly    Genitalia:   Deferred    Rectal:   Deferred    Extremities:  No cyanosis, clubbing or edema        Skin:  Skin color, texture, turgor normal, no rashes or lesions          Lab Results:   Admission on 08/14/2020   Component Date Value    WBC 08/14/2020 7 70     RBC 08/14/2020 4 99     Hemoglobin 08/14/2020 15 8*    Hematocrit 08/14/2020 47 9*    MCV 08/14/2020 96     MCH 08/14/2020 31 7     MCHC 08/14/2020 33 0     RDW 08/14/2020 12 7     MPV 08/14/2020 9 7     Platelets 84/33/6487 246     nRBC 08/14/2020 0     Neutrophils Relative 08/14/2020 59     Immat GRANS % 08/14/2020 1     Lymphocytes Relative 08/14/2020 33     Monocytes Relative 08/14/2020 5     Eosinophils Relative 08/14/2020 1     Basophils Relative 08/14/2020 1     Neutrophils Absolute 08/14/2020 4 63     Immature Grans Absolute 08/14/2020 0 04     Lymphocytes Absolute 08/14/2020 2 56     Monocytes Absolute 08/14/2020 0 38     Eosinophils Absolute 08/14/2020 0 05     Basophils Absolute 08/14/2020 0 04     Sodium 08/14/2020 139     Potassium 08/14/2020 3 9     Chloride 08/14/2020 101     CO2 08/14/2020 29     ANION GAP 08/14/2020 9     BUN 08/14/2020 12     Creatinine 08/14/2020 1 01     Glucose 08/14/2020 88     Calcium 08/14/2020 9 0     AST 08/14/2020 15     ALT 08/14/2020 31     Alkaline Phosphatase 08/14/2020 65     Total Protein 08/14/2020 8 3*    Albumin 08/14/2020 4 3     Total Bilirubin 08/14/2020 0 78     eGFR 08/14/2020 58     Lipase 08/14/2020 118     Troponin I 08/14/2020 <0 02     LACTIC ACID 08/14/2020 0 9     Troponin I 08/15/2020 <0 02     Platelets 51/32/4974 219     MPV 08/15/2020 9 7     Sodium 08/15/2020 141     Potassium 08/15/2020 3 9     Chloride 08/15/2020 105     CO2 08/15/2020 27     ANION GAP 08/15/2020 9     BUN 08/15/2020 13     Creatinine 08/15/2020 0 92     Glucose 08/15/2020 95  Calcium 08/15/2020 8 8     eGFR 08/15/2020 65     WBC 08/15/2020 6 22     RBC 08/15/2020 4 32     Hemoglobin 08/15/2020 13 5     Hematocrit 08/15/2020 42 6     MCV 08/15/2020 99*    MCH 08/15/2020 31 3     MCHC 08/15/2020 31 7     RDW 08/15/2020 12 8     Platelets 85/49/0790 232     MPV 08/15/2020 9 7        Imaging Studies: I have personally reviewed pertinent imaging studies  Ct Abdomen Pelvis With Contrast    Result Date: 8/14/2020  Impression: No significant interval change since the prior examination  No discrete peripancreatic inflammatory changes seen  No evidence for bowel obstruction  Status post cholecystectomy without evidence for biliary ductal dilatation  Colonic diverticulosis without evidence for diverticulitis  This patient was seen and examined by Dr Hilary Montes  All [place] medical decisions were made by Dr Hilary Montes  Thank you for allowing us to participate in the care of this patient  We will follow up closely with you

## 2020-08-15 NOTE — ASSESSMENT & PLAN NOTE
· Blood pressure noted to be elevated at greater than 485 systolic on admission which is now improved  · P r n  Hydralazine for elevated pressures greater than 037 systolic  · Would ideally not like to drop the blood pressure greater than 25%  · Continue home medications including amlodipine 5 mg daily,  · Hydrochlorothiazide 12 5 mg daily  · Monitor BP

## 2020-08-15 NOTE — PLAN OF CARE
Problem: PAIN - ADULT  Goal: Verbalizes/displays adequate comfort level or baseline comfort level  Description: Interventions:  - Encourage patient to monitor pain and request assistance  - Assess pain using appropriate pain scale  - Administer analgesics based on type and severity of pain and evaluate response  - Implement non-pharmacological measures as appropriate and evaluate response  - Consider cultural and social influences on pain and pain management  - Notify physician/advanced practitioner if interventions unsuccessful or patient reports new pain  Outcome: Progressing     Problem: SAFETY ADULT  Goal: Patient will remain free of falls  Description: INTERVENTIONS:  - Assess patient frequently for physical needs  -  Identify cognitive and physical deficits and behaviors that affect risk of falls    -  [place] fall precautions as indicated by assessment   - Educate patient/family on patient safety including physical limitations  - Instruct patient to call for assistance with activity based on assessment  - Modify environment to reduce risk of injury  - Consider OT/PT consult to assist with strengthening/mobility  Outcome: Progressing

## 2020-08-16 PROCEDURE — 99232 SBSQ HOSP IP/OBS MODERATE 35: CPT | Performed by: PHYSICIAN ASSISTANT

## 2020-08-16 RX ADMIN — DICYCLOMINE HYDROCHLORIDE 10 MG: 10 CAPSULE ORAL at 21:28

## 2020-08-16 RX ADMIN — DICYCLOMINE HYDROCHLORIDE 10 MG: 10 CAPSULE ORAL at 06:11

## 2020-08-16 RX ADMIN — DICYCLOMINE HYDROCHLORIDE 10 MG: 10 CAPSULE ORAL at 17:48

## 2020-08-16 RX ADMIN — AMLODIPINE BESYLATE 5 MG: 5 TABLET ORAL at 08:35

## 2020-08-16 RX ADMIN — ALUMINUM HYDROXIDE, MAGNESIUM HYDROXIDE, AND SIMETHICONE 30 ML: 200; 200; 20 SUSPENSION ORAL at 11:34

## 2020-08-16 RX ADMIN — LEVOTHYROXINE SODIUM 75 MCG: 75 TABLET ORAL at 06:11

## 2020-08-16 RX ADMIN — PANTOPRAZOLE SODIUM 40 MG: 40 TABLET, DELAYED RELEASE ORAL at 17:48

## 2020-08-16 RX ADMIN — SODIUM CHLORIDE 75 ML/HR: 0.9 INJECTION, SOLUTION INTRAVENOUS at 00:20

## 2020-08-16 RX ADMIN — ALUMINUM HYDROXIDE, MAGNESIUM HYDROXIDE, AND SIMETHICONE 30 ML: 200; 200; 20 SUSPENSION ORAL at 17:48

## 2020-08-16 RX ADMIN — SUCRALFATE 1000 MG: 1 SUSPENSION ORAL at 11:34

## 2020-08-16 RX ADMIN — SUCRALFATE 1000 MG: 1 SUSPENSION ORAL at 17:48

## 2020-08-16 RX ADMIN — PANTOPRAZOLE SODIUM 40 MG: 40 TABLET, DELAYED RELEASE ORAL at 06:11

## 2020-08-16 RX ADMIN — SUCRALFATE 1000 MG: 1 SUSPENSION ORAL at 06:11

## 2020-08-16 RX ADMIN — DICYCLOMINE HYDROCHLORIDE 10 MG: 10 CAPSULE ORAL at 11:34

## 2020-08-16 RX ADMIN — SODIUM CHLORIDE 50 ML/HR: 0.9 INJECTION, SOLUTION INTRAVENOUS at 12:15

## 2020-08-16 RX ADMIN — FAMOTIDINE 20 MG: 20 TABLET, FILM COATED ORAL at 08:35

## 2020-08-16 RX ADMIN — ALUMINUM HYDROXIDE, MAGNESIUM HYDROXIDE, AND SIMETHICONE 30 ML: 200; 200; 20 SUSPENSION ORAL at 06:45

## 2020-08-16 RX ADMIN — ENOXAPARIN SODIUM 40 MG: 40 INJECTION SUBCUTANEOUS at 08:35

## 2020-08-16 NOTE — ASSESSMENT & PLAN NOTE
· Patient with epigastric pain and the inability to eat for the past couple of days  · DDX: include erosive esophagitis verses pill esophagitis versus malignancy verses hiatal hernia verses chest pain versus obstruction  · CT abdomen pelvis negative  · Patient started on Bentyl, carafate, Protonix 40 mg twice a day, Mylanta and pepcid  Prn antiemetics/ gentle ivf  · Patient was scheduled endoscopy and colonoscopy in September 29th with [facility]    However patient states she cannot wait that long due to significant discomfort  · Appreciate Gastroenterology input --plan for EGD tomorrow

## 2020-08-16 NOTE — ASSESSMENT & PLAN NOTE
· Blood pressure noted to be elevated at greater than 258 systolic on admission which is now improved  · P r n  Hydralazine for elevated pressures greater than 625 systolic  · Would ideally not like to drop the blood pressure greater than 25%  · Continue home medications including amlodipine 5 mg daily,Hydrochlorothiazide 12 5 mg daily  · Monitor BP

## 2020-08-16 NOTE — PROGRESS NOTES
Progress Note - Saadia Gaspar 1955, 76 y o  female MRN: [id number]    Unit/Bed#: S -01 Encounter: [id number]    Primary Care Provider: Matt Marion DO   Date and time admitted to hospital: 8/14/2020  6:12 PM  * Abdominal pain  Assessment & Plan  · Patient with epigastric pain and the inability to eat for the past couple of days  · DDX: include erosive esophagitis verses pill esophagitis versus malignancy verses hiatal hernia verses chest pain versus obstruction  · CT abdomen pelvis negative  · Patient started on Bentyl, carafate, Protonix 40 mg twice a day, Mylanta and pepcid  Prn antiemetics/ gentle ivf  · Patient was scheduled endoscopy and colonoscopy in September 29th with [facility]  However patient states she cannot wait that long due to significant discomfort  · Appreciate Gastroenterology input --plan for EGD tomorrow    Hypertensive urgency  Assessment & Plan  · Blood pressure noted to be elevated at greater than 214 systolic on admission which is now improved  · P r n  Hydralazine for elevated pressures greater than 032 systolic  · Would ideally not like to drop the blood pressure greater than 25%  · Continue home medications including amlodipine 5 mg daily,Hydrochlorothiazide 12 5 mg daily  · Monitor BP  GERD (gastroesophageal reflux disease)  Assessment & Plan  · Continue mylanta  · Continue PPI   · Pain likely related to esophagitis     Hypothyroidism  Assessment & Plan  · Continue synthroid     VTE Pharmacologic Prophylaxis:   Pharmacologic: Enoxaparin (Lovenox) now to be held pre EGD  Mechanical VTE Prophylaxis in Place: No    Patient Centered Rounds: I have performed bedside rounds with nursing staff today  Discussions with Specialists or Other Care Team Provider:     Education and Discussions with Family / Patient:     Time Spent for Care: 25 min  More than 50% of total time spent on counseling and coordination of care as described above      Current Length of Stay: 2 day(s)    Current Patient Status: Inpatient   Certification Statement: The patient will continue to require additional inpatient hospital stay due to inpt egd    Discharge Plan: hopefully tomorrow after EGD    Code Status: Level 1 - Full Code    Subjective:   Patient had some burning epigastric discomfort last night  No current nausea or vomiting  She had green unformed but not watery stool just now (which I personally saw), not black or bloody  Objective:     Vitals:   Temp (24hrs), Av 1 °F (36 7 °C), Min:98 °F (36 7 °C), Max:98 2 °F (36 8 °C)    Temp:  [98 °F (36 7 °C)-98 2 °F (36 8 °C)] 98 °F (36 7 °C)  HR:  [58-66] 58  Resp:  [18] 18  BP: (107-134)/(60-68) 134/68  SpO2:  [96 %-97 %] 97 %  Body mass index is 29 68 kg/m²  Input and Output Summary (last 24 hours): Intake/Output Summary (Last 24 hours) at 2020 1050  Last data filed at 2020 0020  Gross per 24 hour   Intake 1161 25 ml   Output --   Net 1161 25 ml       Physical Exam:     Physical Exam  Vitals signs reviewed  Constitutional:       General: She is not in acute distress  Appearance: Normal appearance  She is normal weight  She is not ill-appearing, toxic-appearing or diaphoretic  HENT:      Head: Normocephalic and atraumatic  Nose: No congestion  Eyes:      Conjunctiva/sclera: Conjunctivae normal    Neck:      Musculoskeletal: Neck supple  Cardiovascular:      Rate and Rhythm: Normal rate and regular rhythm  Heart sounds: Normal heart sounds  No murmur  Pulmonary:      Effort: Pulmonary effort is normal  No respiratory distress  Breath sounds: Normal breath sounds  No stridor  No wheezing, rhonchi or rales  Abdominal:      General: Bowel sounds are normal  There is no distension  Palpations: Abdomen is soft  Tenderness: There is no abdominal tenderness  There is no guarding  Musculoskeletal:      Right lower leg: No edema  Left lower leg: No edema     Skin: General: Skin is warm and dry  Coloration: Skin is not jaundiced or pale  Findings: No bruising, erythema or rash  Neurological:      General: No focal deficit present  Mental Status: She is alert  Comments: Awake alert interactive pleasant and cooperative, ambulating in the room without difficulty  No confusion   Psychiatric:         Mood and Affect: Mood normal        Additional Data:     Labs:    Results from last 7 days   Lab Units 08/15/20  0623  08/14/20  1829   WBC Thousand/uL 6 22  --  7 70   HEMOGLOBIN g/dL 13 5  --  15 8*   HEMATOCRIT % 42 6  --  47 9*   PLATELETS Thousands/uL 232   < > 246   NEUTROS PCT %  --   --  59   LYMPHS PCT %  --   --  33   MONOS PCT %  --   --  5   EOS PCT %  --   --  1    < > = values in this interval not displayed  Results from last 7 days   Lab Units 08/15/20  0623 08/14/20  1829   SODIUM mmol/L 141 139   POTASSIUM mmol/L 3 9 3 9   CHLORIDE mmol/L 105 101   CO2 mmol/L 27 29   BUN mg/dL 13 12   CREATININE mg/dL 0 92 1 01   ANION GAP mmol/L 9 9   CALCIUM mg/dL 8 8 9 0   ALBUMIN g/dL  --  4 3   TOTAL BILIRUBIN mg/dL  --  0 78   ALK PHOS U/L  --  65   ALT U/L  --  31   AST U/L  --  15   GLUCOSE RANDOM mg/dL 95 88                 Results from last 7 days   Lab Units 08/14/20  1859   LACTIC ACID mmol/L 0 9     * I Have Reviewed All Lab Data Listed Above  * Additional Pertinent Lab Tests Reviewed:  Lakeisha Lopez Admission Reviewed    Imaging:    Imaging Reports Reviewed Today Include: ct scan  Imaging Personally Reviewed by Myself Includes:      Recent Cultures (last 7 days):           Last 24 Hours Medication List:   Current Facility-Administered Medications   Medication Dose Route Frequency Provider Last Rate    acetaminophen  650 mg Oral Q6H PRN Luke Lima PA-C      aluminum-magnesium hydroxide-simethicone  30 mL Oral Q4H PRN Luke Lima PA-C      amLODIPine  5 mg Oral Daily Luke Lima PA-C      dicyclomine  10 mg Oral 4x Daily (AC & HS) Luke Lima PA-C      famotidine  20 mg Oral Daily Scott Vallejo MD      hydrALAZINE  10 mg Intravenous Q6H PRN Marley Oconnor PA-C      levothyroxine  75 mcg Oral Early Morning Luke Lima PA-C      meclizine  25 mg Oral TID PRN Marley Oconnor PA-C      pantoprazole  40 mg Oral BID AC Luke Lima PA-C      simethicone  80 mg Oral 4x Daily PRN Marley Oconnor PA-C      sodium chloride  75 mL/hr Intravenous Continuous Luke Lima PA-C 75 mL/hr (08/16/20 0020)    sucralfate  1,000 mg Oral Q6H [facility] [place]          Today, Patient Was Seen By: Obie Prader, PA-C    ** Please Note: Dictation voice to text software may have been used in the creation of this document   **

## 2020-08-17 ENCOUNTER — APPOINTMENT (INPATIENT)
Dept: GASTROENTEROLOGY | Facility: HOSPITAL | Age: 65
DRG: 392 | End: 2020-08-17
Payer: COMMERCIAL

## 2020-08-17 ENCOUNTER — ANESTHESIA EVENT (INPATIENT)
Dept: GASTROENTEROLOGY | Facility: HOSPITAL | Age: 65
DRG: 392 | End: 2020-08-17
Payer: COMMERCIAL

## 2020-08-17 ENCOUNTER — ANESTHESIA (INPATIENT)
Dept: GASTROENTEROLOGY | Facility: HOSPITAL | Age: 65
DRG: 392 | End: 2020-08-17
Payer: COMMERCIAL

## 2020-08-17 VITALS
TEMPERATURE: 98 F | DIASTOLIC BLOOD PRESSURE: 71 MMHG | RESPIRATION RATE: 20 BRPM | OXYGEN SATURATION: 96 % | WEIGHT: 162 LBS | SYSTOLIC BLOOD PRESSURE: 132 MMHG | HEIGHT: 62 IN | BODY MASS INDEX: 29.81 KG/M2 | HEART RATE: 67 BPM

## 2020-08-17 LAB
ATRIAL RATE: 66 BPM
P AXIS: 78 DEGREES
PR INTERVAL: 162 MS
QRS AXIS: 15 DEGREES
QRSD INTERVAL: 68 MS
QT INTERVAL: 420 MS
QTC INTERVAL: 430 MS
T WAVE AXIS: 43 DEGREES
VENTRICULAR RATE: 63 BPM

## 2020-08-17 PROCEDURE — 88342 IMHCHEM/IMCYTCHM 1ST ANTB: CPT | Performed by: PATHOLOGY

## 2020-08-17 PROCEDURE — 88313 SPECIAL STAINS GROUP 2: CPT | Performed by: PATHOLOGY

## 2020-08-17 PROCEDURE — 88341 IMHCHEM/IMCYTCHM EA ADD ANTB: CPT | Performed by: PATHOLOGY

## 2020-08-17 PROCEDURE — 99239 HOSP IP/OBS DSCHRG MGMT >30: CPT | Performed by: PHYSICIAN ASSISTANT

## 2020-08-17 PROCEDURE — 93010 ELECTROCARDIOGRAM REPORT: CPT | Performed by: INTERNAL MEDICINE

## 2020-08-17 PROCEDURE — 43239 EGD BIOPSY SINGLE/MULTIPLE: CPT | Performed by: INTERNAL MEDICINE

## 2020-08-17 PROCEDURE — 88305 TISSUE EXAM BY PATHOLOGIST: CPT | Performed by: PATHOLOGY

## 2020-08-17 PROCEDURE — 0DB68ZX EXCISION OF STOMACH, VIA NATURAL OR ARTIFICIAL OPENING ENDOSCOPIC, DIAGNOSTIC: ICD-10-PCS | Performed by: INTERNAL MEDICINE

## 2020-08-17 PROCEDURE — 99232 SBSQ HOSP IP/OBS MODERATE 35: CPT | Performed by: PHYSICIAN ASSISTANT

## 2020-08-17 RX ORDER — SODIUM CHLORIDE, SODIUM LACTATE, POTASSIUM CHLORIDE, CALCIUM CHLORIDE 600; 310; 30; 20 MG/100ML; MG/100ML; MG/100ML; MG/100ML
INJECTION, SOLUTION INTRAVENOUS CONTINUOUS PRN
Status: DISCONTINUED | OUTPATIENT
Start: 2020-08-17 | End: 2020-08-17

## 2020-08-17 RX ORDER — FAMOTIDINE 20 MG/1
20 TABLET, FILM COATED ORAL DAILY
Qty: 30 TABLET | Refills: 0 | Status: SHIPPED | OUTPATIENT
Start: 2020-08-18 | End: 2020-09-08 | Stop reason: SDUPTHER

## 2020-08-17 RX ORDER — METOCLOPRAMIDE 5 MG/1
5 TABLET ORAL EVERY 6 HOURS PRN
Qty: 30 TABLET | Refills: 0 | Status: SHIPPED | OUTPATIENT
Start: 2020-08-17 | End: 2022-07-01

## 2020-08-17 RX ORDER — ALBUTEROL SULFATE 2.5 MG/3ML
2.5 SOLUTION RESPIRATORY (INHALATION) ONCE
Status: COMPLETED | OUTPATIENT
Start: 2020-08-17 | End: 2020-08-17

## 2020-08-17 RX ORDER — SIMETHICONE 80 MG
80 TABLET,CHEWABLE ORAL 4 TIMES DAILY PRN
Qty: 30 TABLET | Refills: 0 | Status: SHIPPED | OUTPATIENT
Start: 2020-08-17 | End: 2020-10-06 | Stop reason: ALTCHOICE

## 2020-08-17 RX ORDER — PROPOFOL 10 MG/ML
INJECTION, EMULSION INTRAVENOUS AS NEEDED
Status: DISCONTINUED | OUTPATIENT
Start: 2020-08-17 | End: 2020-08-17

## 2020-08-17 RX ORDER — DICYCLOMINE HYDROCHLORIDE 10 MG/1
10 CAPSULE ORAL EVERY 6 HOURS PRN
Refills: 0 | Status: CANCELLED
Start: 2020-08-17

## 2020-08-17 RX ADMIN — SODIUM CHLORIDE 50 ML/HR: 0.9 INJECTION, SOLUTION INTRAVENOUS at 08:15

## 2020-08-17 RX ADMIN — AMLODIPINE BESYLATE 5 MG: 5 TABLET ORAL at 08:15

## 2020-08-17 RX ADMIN — PROPOFOL 80 MG: 10 INJECTION, EMULSION INTRAVENOUS at 14:49

## 2020-08-17 RX ADMIN — SODIUM CHLORIDE, SODIUM LACTATE, POTASSIUM CHLORIDE, AND CALCIUM CHLORIDE: .6; .31; .03; .02 INJECTION, SOLUTION INTRAVENOUS at 14:45

## 2020-08-17 RX ADMIN — PROPOFOL 20 MG: 10 INJECTION, EMULSION INTRAVENOUS at 14:51

## 2020-08-17 RX ADMIN — DICYCLOMINE HYDROCHLORIDE 10 MG: 10 CAPSULE ORAL at 11:37

## 2020-08-17 RX ADMIN — ALBUTEROL SULFATE 2.5 MG: 2.5 SOLUTION RESPIRATORY (INHALATION) at 15:03

## 2020-08-17 RX ADMIN — SUCRALFATE 1000 MG: 1 SUSPENSION ORAL at 00:14

## 2020-08-17 RX ADMIN — FAMOTIDINE 20 MG: 20 TABLET, FILM COATED ORAL at 08:15

## 2020-08-17 RX ADMIN — SUCRALFATE 1000 MG: 1 SUSPENSION ORAL at 11:37

## 2020-08-17 NOTE — PLAN OF CARE
Problem: PAIN - ADULT  Goal: Verbalizes/displays adequate comfort level or baseline comfort level  Description: Interventions:  - Encourage patient to monitor pain and request assistance  - Assess pain using appropriate pain scale  - Administer analgesics based on type and severity of pain and evaluate response  - Implement non-pharmacological measures as appropriate and evaluate response  - Consider cultural and social influences on pain and pain management  - Notify physician/advanced practitioner if interventions unsuccessful or patient reports new pain  Outcome: Progressing     Problem: SAFETY ADULT  Goal: Patient will remain free of falls  Description: INTERVENTIONS:  - Assess patient frequently for physical needs  -  Identify cognitive and physical deficits and behaviors that affect risk of falls    -  Searcy fall precautions as indicated by assessment   - Educate patient/family on patient safety including physical limitations  - Instruct patient to call for assistance with activity based on assessment  - Modify environment to reduce risk of injury  - Consider OT/PT consult to assist with strengthening/mobility  Outcome: Progressing

## 2020-08-17 NOTE — UTILIZATION REVIEW
Notification of Inpatient Admission/Inpatient Authorization Request   This is a Notification of Inpatient Admission for [address]  Be advised that this patient was admitted to our facility under Inpatient Status  Contact Eugenia Burgos at [phone] for additional admission information  [facility]  DEDICATED [phone]  Patient Name:   Laverne Stringer   YOB: 1955       [address]   [address]:   [address]  Tax ID: [id number]  NPI: [id number] Attending Provider/NPI:  Address:  Phone: Ileana Cabrera Md [[id number]]  Same as the facility  [phone]   Place of Service Code: 24 Place of Service Name:  [address] [place] [address]   Start Date: 8/14/20 2034     Discharge Date & Time: No discharge date for patient encounter  Type of Admission: Inpatient Status Discharge Disposition   (if discharged): Home/Self Care   Patient Diagnoses: Epigastric pain [R10 13]  Abdominal pain [R10 9]  Nausea and vomiting [R11 2]     Orders: Admission Orders (From admission, onward)     Ordered        08/14/20 2034  Inpatient Admission  Once                    Assigned Utilization Review Contact: Eugenia Burgos  Utilization   Network Utilization Review Department  Phone: [phone]; Fax [phone]  Email: [email or web address]   ATTENTION PAYERS: Please call the assigned Utilization  directly with any questions or concerns ALL voicemails in the department are confidential  Send all requests for admission clinical reviews, approved or denied determinations and any other requests to dedicated fax number belonging to the campus where the patient is receiving treatment      Initial Clinical Review    Admission: Date/Time/Statement:   Admission Orders (From admission, onward)     Ordered        08/14/20 2034  Inpatient Admission  Once                   Orders Placed This Encounter Procedures    Inpatient Admission     Standing Status:   Standing     Number of Occurrences:   1     Order Specific Question:   Admitting Physician     Answer:   Wander Francisco [[id number]]     Order Specific Question:   Level of Care     Answer:   Med Surg [16]     Order Specific Question:   Estimated length of stay     Answer:   More than 2 Midnights     Order Specific Question:   Certification     Answer:   I certify that inpatient services are medically necessary for this patient for a duration of greater than two midnights  See H&P and MD Progress Notes for additional information about the patient's course of treatment  ED Arrival Information     Expected Arrival Acuity Means of Arrival Escorted By Service Admission Type    - 8/14/2020 18:04 Urgent Walk-In Family Member General Medicine Urgent    Arrival Complaint    ABDOMINAL PAIN        Chief Complaint   Patient presents with    Abdominal Pain     pt woke up with upper abdominal pain and bloating this morning, vomittingx1  also c/o soft stoolsx3 months     Assessment/Plan: this is a 76year old female from home to ED admitted to inpatient due to abdominal pain/hypertensive urgency  History of GERD, hypothyroid, hpt  Presented due to worsening upper abdominal pain over last several months with bloating, nausea and intractable vomiting  Seen in ED twice in July for similar  Is unable to tolerate po for last couple of days     On exam abdominal tenderness in RUQ and epigastric area  Hypertensive  H&H 15 8/47  9  CT abdomen without acute findings, is status post cholecystectomy without duct dilatation  In the ED given mylicon, tylenol and Pepcid  Plan is BP control, IVF, pain control with bentyl, Mylanta,aquaK, PPI  GI consulted  On 8/15/2020: patient with continued abdominal pain and bloating, today with loose stool  On exam abdominal distention, epigastric and abdominal tenderness  IVF continue   Pepcid added  Per GI on 8/15/2020:  Patient with epigastric and abdominal pain with bloating with differential of gastritis vs peptic ulcer disease vs gastroparesis vs functional  Plan is increase PPI to BID, add Carafate, no NSAIDS  For EGD on 8/17/2020  ED Triage Vitals   Temperature Pulse Respirations Blood Pressure SpO2   08/14/20 1817 08/14/20 1815 08/14/20 1815 08/14/20 1815 08/14/20 1815   98 8 °F (37 1 °C) 78 18 (!) 185/80 98 %      Temp Source Heart Rate Source Patient Position - Orthostatic VS BP Location FiO2 (%)   08/14/20 1817 08/14/20 1815 08/14/20 1815 08/14/20 1815 --   Oral Monitor Sitting Right arm       Pain Score       08/14/20 2143       6          Wt Readings from Last 1 Encounters:   08/14/20 73 6 kg (162 lb 4 1 oz)     Additional Vital Signs:   08/15/20 0630   97 5 °F (36 4 °C)   60   18   141/65   96 %   None (Room air)   Lying    08/14/20 2149   98 1 °F (36 7 °C)   60   18   120/68   98 %   None (Room air)   Lying    08/14/20 2120   --   70   18   137/65   97 %   None (Room air)          Pertinent Labs/Diagnostic Test Results:   8/14/2020 CT abdomen No significant interval change since the prior examination  No discrete peripancreatic inflammatory changes seen   No evidence for bowel obstruction  Status post cholecystectomy without evidence for biliary ductal dilatation     Colonic diverticulosis without evidence for diverticulitis      Results from last 7 days   Lab Units 08/15/20  0623 08/15/20  0021 08/14/20  1829   WBC Thousand/uL 6 22  --  7 70   HEMOGLOBIN g/dL 13 5  --  15 8*   HEMATOCRIT % 42 6  --  47 9*   PLATELETS Thousands/uL 232 219 246   NEUTROS ABS Thousands/µL  --   --  4 63       Results from last 7 days   Lab Units 08/15/20  0623 08/14/20  1829   SODIUM mmol/L 141 139   POTASSIUM mmol/L 3 9 3 9   CHLORIDE mmol/L 105 101   CO2 mmol/L 27 29   ANION GAP mmol/L 9 9   BUN mg/dL 13 12   CREATININE mg/dL 0 92 1 01   EGFR ml/min/1 73sq m 65 58   CALCIUM mg/dL 8 8 9 0     Results from last 7 days   Lab Units 08/14/20  1829   AST U/L 15   ALT U/L 31   ALK PHOS U/L 65   TOTAL PROTEIN g/dL 8 3*   ALBUMIN g/dL 4 3   TOTAL BILIRUBIN mg/dL 0 78     Results from last 7 days   Lab Units 08/15/20  0623 08/14/20  1829   GLUCOSE RANDOM mg/dL 95 88     Results from last 7 days   Lab Units 08/15/20  0021 08/14/20  1829   TROPONIN I ng/mL <0 02 <0 02     Results from last 7 days   Lab Units 08/14/20  1859   LACTIC ACID mmol/L 0 9     Results from last 7 days   Lab Units 08/14/20  1829   LIPASE u/L 118     ED Treatment:   Medication Administration from 08/14/2020 1804 to 08/14/2020 2137       Date/Time Order Dose Route Action Comments     08/14/2020 1859 famotidine (PEPCID) tablet 20 mg 20 mg Oral Given      08/14/2020 1222 simethicone (MYLICON) chewable tablet 80 mg 80 mg Oral Given      08/14/2020 1859 acetaminophen (TYLENOL) tablet 975 mg 975 mg Oral Given         Past Medical History:   Diagnosis Date    Disease of thyroid gland     Vertigo      Present on Admission:   Hypothyroidism   Hypertensive urgency   GERD (gastroesophageal reflux disease)      Admitting Diagnosis: Epigastric pain [R10 13]  Abdominal pain [R10 9]  Nausea and vomiting [R11 2]  Age/Sex: 76 y o  female  Admission Orders: 8/14/2020 2034 inpatient   Scheduled Medications:  amLODIPine, 5 mg, Oral, Daily  dicyclomine, 10 mg, Oral, 4x Daily (AC & HS)  enoxaparin, 40 mg, Subcutaneous, Daily  famotidine, 20 mg, Oral, Daily  levothyroxine, 75 mcg, Oral, Early Morning  pantoprazole, 40 mg, Oral, BID AC  sucralfate, 1,000 mg, Oral, Q6H NANETTE  famotidine (PEPCID) tablet 20 mg    Dose: 20 mg  Freq: Daily Route: PO  Start: 08/15/20 1000     Continuous IV Infusions:  sodium chloride, 50 mL/hr, Intravenous, Continuous      PRN Meds:  acetaminophen, 650 mg, Oral, Q6H PRN - used x 1   aluminum-magnesium hydroxide-simethicone, 30 mL, Oral, Q4H PRN  hydrALAZINE, 10 mg, Intravenous, Q6H PRN  meclizine, 25 mg, Oral, TID PRN  simethicone, 80 mg, Oral, 4x Daily PRN    IP CONSULT TO GASTROENTEROLOGY    Network Utilization Review Department  [email or web address]  ATTENTION: Please call with any questions or concerns to [phone] and carefully listen to the prompts so that you are directed to the right person  All voicemails are confidential   Ty Limb all requests for admission clinical reviews, approved or denied determinations and any other requests to dedicated fax number below belonging to the campus where the patient is receiving treatment   List of dedicated fax numbers for the Facilities:  [address] DENIALS (Administrative/Medical Necessity) [phone]   [address] (Maternity/NICU/Pediatrics) [phone]   Anahi Roberts [phone]   Veronica Hernandez [phone]   Rosario Heady [phone]   Hettie Lips [phone]   [address] [place] [address] [phone]   [facility]  [phone]   [address] [facility] [address]  [address] [facility] [address] [facility] [phone]

## 2020-08-17 NOTE — ANESTHESIA PREPROCEDURE EVALUATION
Procedure:  EGD    Relevant Problems   ANESTHESIA (within normal limits)      CARDIO   (+) Hypertensive urgency   (+) Mixed hyperlipidemia      ENDO   (+) Hypothyroidism      GI/HEPATIC   (+) GERD (gastroesophageal reflux disease)      /RENAL (within normal limits)      HEMATOLOGY (within normal limits)      MUSCULOSKELETAL (within normal limits)      NEURO/PSYCH (within normal limits)      PULMONARY (within normal limits)      Other   (+) Abdominal pain     NM Study 7/2020:  -  Stress results: Duration of exercise was 7 min  Target heart rate was achieved  There was no chest pain during stress  -  ECG conclusions: The stress ECG was negative for ischemia and normal   -  Perfusion imaging: There were no perfusion defects   -  Gated SPECT: The calculated left ventricular ejection fraction was 80 %  There was no left ventricular regional abnormality      IMPRESSIONS: Normal study after maximal exercise without reproduction of symptoms  Myocardial perfusion imaging was normal at rest and with stress  Left ventricular systolic function was normal      Lab Results   Component Value Date    WBC 6 22 08/15/2020    HGB 13 5 08/15/2020    HCT 42 6 08/15/2020    MCV 99 (H) 08/15/2020     08/15/2020     Lab Results   Component Value Date    SODIUM 141 08/15/2020    K 3 9 08/15/2020     08/15/2020    CO2 27 08/15/2020    BUN 13 08/15/2020    CREATININE 0 92 08/15/2020    GLUC 95 08/15/2020    CALCIUM 8 8 08/15/2020     No results found for: INR, PROTIME  Lab Results   Component Value Date    HGBA1C 5 3 07/05/2020          Physical Exam    Airway    Mallampati score: II  TM Distance: >3 FB  Neck ROM: full     Dental   No notable dental hx     Cardiovascular  Cardiovascular exam normal    Pulmonary  Pulmonary exam normal     Other Findings        Anesthesia Plan  ASA Score- 2     Anesthesia Type- IV sedation with anesthesia with ASA Monitors           Additional Monitors:   Airway Plan:           Plan Factors-Exercise tolerance (METS): >4 METS  Chart reviewed  EKG reviewed  Existing labs reviewed  Patient summary reviewed  Induction- intravenous  Postoperative Plan-     Informed Consent- Anesthetic plan and risks discussed with patient  I personally reviewed this patient with the CRNA  Discussed and agreed on the Anesthesia Plan with the CRNA  Ángel Laboy

## 2020-08-17 NOTE — PROGRESS NOTES
Progress Note - Ayse Fountain 1955, 76 y o  female MRN: [id number]    Unit/Bed#: S -01 Encounter: [id number]    Primary Care Provider: Claudene Lyme, DO   Date and time admitted to hospital: 8/14/2020  6:12 PM  * Abdominal pain  Assessment & Plan  · Patient with epigastric pain and the inability to eat for the past couple of days  · DDX: include erosive esophagitis verses pill esophagitis versus malignancy verses hiatal hernia verses chest pain versus obstruction  · CT abdomen pelvis negative  · Patient started on Bentyl, carafate, Protonix 40 mg twice a day, Mylanta and pepcid  Prn antiemetics/ gentle ivf  · Patient was scheduled endoscopy and colonoscopy in September 29th with [facility]  However patient states she cannot wait that long due to significant discomfort  · Appreciate Gastroenterology input --plan for EGD today    Hypertensive urgency  Assessment & Plan  · Blood pressure noted to be elevated at greater than 521 systolic on admission which is now improved  · P r n  Hydralazine for elevated pressures greater than 579 systolic  · Continue home medications including amlodipine 5 mg daily,Hydrochlorothiazide 12 5 mg daily  · Monitor BP  GERD (gastroesophageal reflux disease)  Assessment & Plan  · Continue mylanta  · Continue PPI   · Pain likely related to esophagitis     Hypothyroidism  Assessment & Plan  · Continue synthroid     VTE Pharmacologic Prophylaxis:   Pharmacologic: hold lovenox for EGD  Mechanical VTE Prophylaxis in Place: No    Patient Centered Rounds: I have performed bedside rounds with nursing staff today  Discussions with Specialists or Other Care Team Provider:  GI    Education and Discussions with Family / Patient:     Time Spent for Care: 25 minutes  More than 50% of total time spent on counseling and coordination of care as described above  Current Length of Stay: 3 day(s)    Current Patient Status: Inpatient   Certification Statement:  The patient will continue to require additional inpatient hospital stay due to inpatient EGD    Discharge Plan: hopefully dc later today after EGD    Code Status: Level 1 - Full Code    Subjective:   Still with some bloating and mild epigastric discomfort but overall feeling better since admission  No black or bloody stools or other concerning symptoms overnight  Objective:     Vitals:   Temp (24hrs), Av 1 °F (36 7 °C), Min:98 °F (36 7 °C), Max:98 1 °F (36 7 °C)    Temp:  [98 °F (36 7 °C)-98 1 °F (36 7 °C)] 98 °F (36 7 °C)  HR:  [57-87] 87  Resp:  [16-20] 20  BP: (112-131)/(59-67) 125/59  SpO2:  [92 %-96 %] 92 %  Body mass index is 29 68 kg/m²  Input and Output Summary (last 24 hours): Intake/Output Summary (Last 24 hours) at 2020 1122  Last data filed at 2020 0900  Gross per 24 hour   Intake 1876 67 ml   Output --   Net 1876 67 ml       Physical Exam:     Physical Exam  Vitals signs reviewed  Constitutional:       General: She is not in acute distress  Appearance: Normal appearance  She is normal weight  She is not ill-appearing, toxic-appearing or diaphoretic  HENT:      Head: Normocephalic  Neck:      Musculoskeletal: Neck supple  Cardiovascular:      Rate and Rhythm: Normal rate and regular rhythm  Heart sounds: No murmur  Pulmonary:      Effort: Pulmonary effort is normal  No respiratory distress  Breath sounds: Normal breath sounds  No wheezing or rales  Abdominal:      General: Bowel sounds are normal  There is no distension  Palpations: Abdomen is soft  Tenderness: There is no abdominal tenderness  There is no guarding  Skin:     General: Skin is warm and dry  Coloration: Skin is not jaundiced or pale  Findings: No bruising, erythema or rash  Neurological:      General: No focal deficit present  Mental Status: She is alert  Comments: Awake alert interactive very pleasant and cooperative  No evidence of confusion  Psychiatric:         Mood and Affect: Mood normal          Thought Content: Thought content normal          Additional Data:     Labs:    Results from last 7 days   Lab Units 08/15/20  0623  08/14/20  1829   WBC Thousand/uL 6 22  --  7 70   HEMOGLOBIN g/dL 13 5  --  15 8*   HEMATOCRIT % 42 6  --  47 9*   PLATELETS Thousands/uL 232   < > 246   NEUTROS PCT %  --   --  59   LYMPHS PCT %  --   --  33   MONOS PCT %  --   --  5   EOS PCT %  --   --  1    < > = values in this interval not displayed  Results from last 7 days   Lab Units 08/15/20  0623 08/14/20  1829   SODIUM mmol/L 141 139   POTASSIUM mmol/L 3 9 3 9   CHLORIDE mmol/L 105 101   CO2 mmol/L 27 29   BUN mg/dL 13 12   CREATININE mg/dL 0 92 1 01   ANION GAP mmol/L 9 9   CALCIUM mg/dL 8 8 9 0   ALBUMIN g/dL  --  4 3   TOTAL BILIRUBIN mg/dL  --  0 78   ALK PHOS U/L  --  65   ALT U/L  --  31   AST U/L  --  15   GLUCOSE RANDOM mg/dL 95 88                 Results from last 7 days   Lab Units 08/14/20  1859   LACTIC ACID mmol/L 0 9     * I Have Reviewed All Lab Data Listed Above    * Additional Pertinent Lab Tests Reviewed: No New Labs Available For Today    Imaging:    Imaging Reports Reviewed Today Include:   Imaging Personally Reviewed by Myself Includes:      Recent Cultures (last 7 days):           Last 24 Hours Medication List:   Current Facility-Administered Medications   Medication Dose Route Frequency Provider Last Rate    acetaminophen  650 mg Oral Q6H PRN Luke Lima PA-C      aluminum-magnesium hydroxide-simethicone  30 mL Oral Q4H PRN Luke Lima PA-C      amLODIPine  5 mg Oral Daily Luke Lima PA-C      dicyclomine  10 mg Oral 4x Daily (AC & HS) Luke Lima PA-C      famotidine  20 mg Oral Daily Rah Beckman MD      hydrALAZINE  10 mg Intravenous Q6H PRN Cleopatra Goodman PA-C      levothyroxine  75 mcg Oral Early Morning Luke Lima PA-C      meclizine  25 mg Oral TID PRN Cleopatra Goodman PA-C      pantoprazole  40 mg Oral BID AC Luke Lima PA-C      simethicone  80 mg Oral 4x Daily PRN Natalia Hayes PA-C      sodium chloride  50 mL/hr Intravenous Continuous Lynsey Salazar PA-C 50 mL/hr (08/17/20 0815)    sucralfate  1,000 mg Oral Q6H [facility]          Today, Patient Was Seen By: Duke Perez PA-C    ** Please Note: Dictation voice to text software may have been used in the creation of this document   **

## 2020-08-17 NOTE — ANESTHESIA POSTPROCEDURE EVALUATION
Post-Op Assessment Note    CV Status:  Stable    Pain management: adequate     Mental Status:  Alert and awake   Hydration Status:  Euvolemic   PONV Controlled:  Controlled   Airway Patency:  Patent      Post Op Vitals Reviewed: Yes      Staff: CRNA, Anesthesiologist         No complications documented      BP   123/64   Temp      Pulse  71   Resp   16   SpO2   95% RA

## 2020-08-17 NOTE — DISCHARGE SUMMARY
Please see progress note from earlier, below is discharge summary:      Discharging Physician / Practitioner: Esha Mcgregor PA-C  PCP: Mauricio Izaguirre DO  Admission Date:   Admission Orders (From admission, onward)     Ordered        08/14/20 2034  Inpatient Admission  Once                   Discharge Date: 08/17/20    Resolved Problems  Date Reviewed: 8/17/2020    None          Consultations During Hospital Stay:  · GI    Procedures Performed:   EGD FINDINGS:  · The duodenum appeared normal   · Mild erythematous mucosa in the antrum; performed cold biopsy  · Diminutive gastric polyps not biopsy  · Non-obstructing Schatzki ring in the GE junction   · CT no acute findings    Significant Findings / Test Results:   · As above    Incidental Findings:   · none    Test Results Pending at Discharge (will require follow up):   · biopsy     Outpatient Tests Requested:    Gastric emptying study    Complications:  none    Reason for Admission: abdominal pain    Hospital Course: Judi Schafer is a 76 y o  female patient who originally presented to the hospital on 8/14/2020 due to epigastric abdominal discomfort associated with bloating and nausea  Patient was given PPI, Carafate, Pepcid, and Maalox  She was seen by GI and stayed hospitalized for an inpatient EGD which was done on Monday  This showed a nonobstructive Schatzki ring and mild erythematous mucosa in the antrum  A cold biopsy was performed of this  Patient overall was quite stable and therefore was discharged home on a low residue diet with recommendation for outpatient follow-up with GI for an outpatient gastric emptying study  In the meantime she could do a trial of Reglan for gastric bloating and nausea  Please see above list of diagnoses and related plan for additional information       Condition at Discharge: stable     Discharge Day Visit / Exam:     * Please refer to separate progress note for these details *    Discussion with Family: Discharge instructions/Information to patient and family:   See after visit summary for information provided to patient and family  Provisions for Follow-Up Care:  See after visit summary for information related to follow-up care and any pertinent home health orders  Disposition: home    Planned Readmission:      Discharge Statement:  I spent 35 minutes discharging the patient  This time was spent on the day of discharge  I had direct contact with the patient on the day of discharge  Greater than 50% of the total time was spent examining patient, answering all patient questions, arranging and discussing plan of care with patient as well as directly providing post-discharge instructions  Additional time then spent on discharge activities  Discharge Medications:  See after visit summary for reconciled discharge medications provided to patient and family        ** Please Note: This note has been constructed using a voice recognition system **

## 2020-08-17 NOTE — ASSESSMENT & PLAN NOTE
· Blood pressure noted to be elevated at greater than 102 systolic on admission which is now improved  · P r n  Hydralazine for elevated pressures greater than 722 systolic  · Continue home medications including amlodipine 5 mg daily,Hydrochlorothiazide 12 5 mg daily  · Monitor BP

## 2020-08-17 NOTE — ASSESSMENT & PLAN NOTE
· Patient with epigastric pain and the inability to eat for the past couple of days  · DDX: include erosive esophagitis verses pill esophagitis versus malignancy verses hiatal hernia verses chest pain versus obstruction  · CT abdomen pelvis negative  · Patient started on Bentyl, carafate, Protonix 40 mg twice a day, Mylanta and pepcid  Prn antiemetics/ gentle ivf  · Patient was scheduled endoscopy and colonoscopy in September 29th with [facility]    However patient states she cannot wait that long due to significant discomfort  · Appreciate Gastroenterology input --plan for EGD today

## 2020-08-17 NOTE — ANESTHESIA POSTPROCEDURE EVALUATION
Post-Op Assessment Note    CV Status:  Stable  Pain Score: 0    Pain management: adequate     Mental Status:  Alert and awake   Hydration Status:  Euvolemic   PONV Controlled:  Controlled   Airway Patency:  Patent      Post Op Vitals Reviewed: Yes      Staff: CRNA   Comments: IH nebulizer given post EGD procedure for ?respiratory wheezing and coughing spells  Awake  Alert  Airway patent  Tolerating simple mask without respiratory distress  No complications documented      BP   134/78   Temp     Pulse 93   Resp 16   SpO2 96

## 2020-08-18 ENCOUNTER — TELEPHONE (OUTPATIENT)
Dept: GASTROENTEROLOGY | Facility: AMBULARY SURGERY CENTER | Age: 65
End: 2020-08-18

## 2020-08-18 NOTE — UTILIZATION REVIEW
Notification of Discharge  This is a Notification of Discharge from our facility [address]  Please be advised that this patient has been discharge from our facility  Below you will find the admission and discharge date and time including the patients disposition  PRESENTATION DATE: 8/14/2020  6:12 PM  OBS ADMISSION DATE:   IP ADMISSION DATE: 8/14/20 2034   DISCHARGE DATE: 8/17/2020  5:18 PM  DISPOSITION: Home/Self Care Home/Self Care   Admission Orders listed below:  Admission Orders (From admission, onward)     Ordered        08/14/20 2034  Inpatient Admission  Once                   Please contact the UR Department if additional information is required to close this patient's authorization/case  [address] [employer] Utilization Review Department  Main: [phone] x carefully listen to the prompts  All voicemails are confidential   [email or web address]  Send all requests for admission clinical reviews, approved or denied determinations and any other requests to dedicated fax number below belonging to the campus where the patient is receiving treatment   List of dedicated fax numbers:  [address] DENIALS (Administrative/Medical Necessity) [phone]   [address] (Maternity/NICU/Pediatrics) [phone] [address] [facility] [phone]   Zhane s [phone]   Tiny Bulgarian [phone]   Elissa Diop [address] [facility] [phone]   [facility]  [phone]   [address] [facility] [address]  [address] [address] [address] [facility] [phone]

## 2020-08-18 NOTE — TELEPHONE ENCOUNTER
Patients GI provider:  Dr James Godfrey    Number to return call: (  [phone]    Reason for call: Pt calling to schedule hospital / Yvette Frey follow up soon---please assist    Scheduled procedure/appointment date if applicable: Apt/procedure  NA

## 2020-08-18 NOTE — TELEPHONE ENCOUNTER
Please offer her 8/31/2020 at 3pm with Kate Fajardo in our [facility] location

## 2020-08-31 ENCOUNTER — OFFICE VISIT (OUTPATIENT)
Dept: GASTROENTEROLOGY | Facility: AMBULARY SURGERY CENTER | Age: 65
End: 2020-08-31
Payer: COMMERCIAL

## 2020-08-31 VITALS — WEIGHT: 155.4 LBS | BODY MASS INDEX: 25.89 KG/M2 | HEIGHT: 65 IN | TEMPERATURE: 97.2 F | RESPIRATION RATE: 18 BRPM

## 2020-08-31 DIAGNOSIS — R14.0 BLOATING: ICD-10-CM

## 2020-08-31 DIAGNOSIS — R11.2 NON-INTRACTABLE VOMITING WITH NAUSEA, UNSPECIFIED VOMITING TYPE: ICD-10-CM

## 2020-08-31 DIAGNOSIS — T17.320A CHOKING DUE TO FOOD (REGURGITATED), INITIAL ENCOUNTER: ICD-10-CM

## 2020-08-31 DIAGNOSIS — R68.81 EARLY SATIETY: ICD-10-CM

## 2020-08-31 DIAGNOSIS — K21.9 GASTROESOPHAGEAL REFLUX DISEASE WITHOUT ESOPHAGITIS: ICD-10-CM

## 2020-08-31 DIAGNOSIS — Z86.010 HISTORY OF COLON POLYPS: ICD-10-CM

## 2020-08-31 DIAGNOSIS — R19.4 CHANGE IN BOWEL HABITS: Primary | ICD-10-CM

## 2020-08-31 PROCEDURE — 99214 OFFICE O/P EST MOD 30 MIN: CPT | Performed by: PHYSICIAN ASSISTANT

## 2020-08-31 RX ORDER — OMEPRAZOLE 40 MG/1
40 CAPSULE, DELAYED RELEASE ORAL 2 TIMES DAILY
COMMUNITY
End: 2020-09-11 | Stop reason: SDUPTHER

## 2020-08-31 NOTE — PROGRESS NOTES
Assessment and Plan    #1  Nausea, vomiting, and early satiety with abdominal bloating: rule out gastroparesis  Is increased risk due to hypothyroidism  -will plan for gastric emptying study  -patient has not use Reglan that was sent home with her from the hospital   We may need to consider use of this in the short term  -discussed trialing small frequent meals and gastroparesis diet with low residue foods  -continue anti reflux measures and diet as below  -may need to consider a barium esophagram to see if she is having retention in the esophagus due to some of her symptoms    #2  GERD without esophagitis  -continue PPI  -can use pepcid as needed at bedtime  -continue carafate as needed  -may need pH and manometry study    #3  Change in bowel habits with loose stools: unclear if this is related to above, rule out celiac disease, also rule out microscopic colitis and malignancy  Last colonoscopy reportedly 5 years ago at [facility] with polyps but I do not have these records   -plan for colonoscopy to assess  -dulc/golytely ordered  -check celiac panel  -check food allergy panel  -consider Scott Weiss in the future     #4  Choking on food: sounds like oropharyngeal dysphagia but reports it is only with a few foods like grapes and watermelons  -discussed video swallow with speech but likely will be normal  -avoid trigger foods  -consider further work up if worsening    #5 History of colon polyps  -colonoscopy as above     I spent 45 minutes with patient and daughter discussing possible etiologies, provided reassurance, and formulating plan  --------------------------------------------------------------------------------------------------------------------    Chief Complaint: f/u hospital    HPI: Ayse Fountain is a 76 y o  female with a history of hypothyroidism, hypertension, and laparoscopic cholecystectomy who presents today for follow up from recent hospitalization    Patient reports that she has been having issues with her stomach ever since having her gallbladder out in 2017  At that time she was having similar symptoms to now and had signs of biliary dyskinesia so she had her gallbladder removed  Since that time she still continues to have occasional symptoms but her daughter reports that over the last year her symptoms have progressively worsened  Patient reports that she gets full very quickly and is constantly bloated  She reports that when she eats after about 2 weeks she will feel so for that she cannot eat and then will also have issues with nausea and vomiting of food  She reports vomiting of bile at times  She reports a burning in her epigastric region if he does not take the Carafate and the omeprazole  He recently was in the hospital for similar symptoms and underwent upper endoscopy which showed some mild gastritis but was otherwise normal   She was recommended to have a gastric emptying study which has not been ordered at this time  She denies any unexplained weight loss  She reports that over the last few months she has noted a change in her bowel movements  They are often loose or very small and thin  She denies any blood in the stool or black tarry stools  Her last colonoscopy was in 2015 and she believes she had a polyp removed at that time  This was done at [facility] and I do not have record of this currently  She also reports that occasionally when she eats certain foods like watermelon and grapes she will have choking episodes on this that leads to coughing and then she feels that she is going to vomit  She is embarrassed to go out need in public because she is concerned she will have coughing fits that will lead to vomiting at restaurants  She feels that it is more that the food is building up and gets to the point where she can no longer take any more food        Review of Systems:   General: negative for fatigue, fever, night sweats or unexpected weight loss  Psychological: negative for anxiety or depression  Ophthalmic: negative for blurry vision or scleral icterus  ENT: negative for headaches, oral lesions, sore throat, vocal changes or dysphagia  Hematological and Lymphatic: negative for pallor or swollen lymph nodes  Respiratory: negative for cough, shortness of breath or wheezing  Cardiovascular: negative for chest pain, edema or murmur  Gastrointestinal: as mentioned in HPI  Genito-Urinary: negative for dysuria or incontinence  Musculoskeletal: negative for joint pain, joint stiffness or joint swelling  Dermatological: negative for pruritus, rash, or jaundice    Current Medications  Current Outpatient Medications   Medication Sig Dispense Refill    aluminum-magnesium hydroxide-simethicone (MYLANTA) 200-200-20 mg/5 mL suspension Take 30 mL by mouth every 4 (four) hours as needed for indigestion or heartburn 355 mL 0    amLODIPine (NORVASC) 5 mg tablet Take 1 tablet (5 mg total) by mouth daily 30 tablet 0    famotidine (PEPCID) 20 mg tablet Take 1 tablet (20 mg total) by mouth daily 30 tablet 0    levothyroxine 75 mcg tablet Take 75 mcg by mouth daily      meclizine (ANTIVERT) 25 mg tablet Take 1 tablet (25 mg total) by mouth 3 (three) times a day as needed for dizziness 30 tablet 0    metoclopramide (REGLAN) 5 mg tablet Take 1 tablet (5 mg total) by mouth every 6 (six) hours as needed (Bloating and nausea ) 30 tablet 0    omeprazole (PriLOSEC) 40 MG capsule Take 40 mg by mouth 2 (two) times a day      simethicone (MYLICON) 80 mg chewable tablet Chew 1 tablet (80 mg total) 4 (four) times a day as needed for flatulence 30 tablet 0    sucralfate (CARAFATE) 1 g/10 mL suspension Take 10 mL (1,000 mg total) by mouth every 6 (six) hours 420 mL 0    bisacodyl (DULCOLAX) 5 mg EC tablet Take 2 tablets (10 mg total) by mouth once for 1 dose Per office instructions 2 tablet 0    polyethylene glycol (GOLYTELY) 4000 mL solution Take 4,000 mL by mouth once for 1 dose Per office instructions 4000 mL 0     No current facility-administered medications for this visit          Past Medical History  Past Medical History:   Diagnosis Date    Colon polyp     Disease of thyroid gland     Vertigo        Past Surgical History  Past Surgical History:   Procedure Laterality Date    GALLBLADDER SURGERY      OOPHORECTOMY         Past Social History   Social History     Socioeconomic History    Marital status: /Civil Union     Spouse name: None    Number of children: None    Years of education: None    Highest education level: None   Occupational History    None   Social Needs    Financial resource strain: None    Food insecurity     Worry: None     Inability: None    Transportation needs     Medical: None     Non-medical: None   Tobacco Use    Smoking status: Never Smoker    Smokeless tobacco: Never Used   Substance and Sexual Activity    Alcohol use: Not Currently     Frequency: Never    Drug use: Never    Sexual activity: None   Lifestyle    Physical activity     Days per week: None     Minutes per session: None    Stress: None   Relationships    Social connections     Talks on phone: None     Gets together: None     Attends Scientologist service: None     Active member of club or organization: None     Attends meetings of clubs or organizations: None     Relationship status: None    Intimate partner violence     Fear of current or ex partner: None     Emotionally abused: None     Physically abused: None     Forced sexual activity: None   Other Topics Concern    None   Social History Narrative    None       The following portions of the patient's history were reviewed and updated as appropriate: allergies, current medications, past family history, past medical history, past social history, past surgical history and problem list     Vital Signs  Vitals:    08/31/20 1453   Resp: 18   Temp: (!) 97 2 °F (36 2 °C)   TempSrc: Temporal   Weight: 70 5 kg (155 lb 6 4 oz)   Height: 5' 5" (1 651 m) Physical Exam:  General appearance: alert, cooperative, no distress  HEENT: normocephalic, anicteric, no eye erythema or discharge, no oropharyngeal thrush  Neck: supple, trachea midline, no adenopathy  Lungs: CTA b/l, no rales, rhonchi, or wheezing, unlabored respirations  Heart: RRR, no murmur, rubs, or gallops  Abdomen: soft, tenderness to deep epigastric palpation, non-distended, normal bowel sounds, no masses or organomegaly  Rectal: deferred  Extremities: no cyanosis, clubbing, or edema  Musculoskeletal: normal gait  Skin: color and texture normal, no jaundice, no rashes or lesions  Psychiatric: alert and oriented, normal affect and behavior

## 2020-09-03 ENCOUNTER — TRANSCRIBE ORDERS (OUTPATIENT)
Dept: LAB | Facility: CLINIC | Age: 65
End: 2020-09-03

## 2020-09-03 ENCOUNTER — APPOINTMENT (OUTPATIENT)
Dept: LAB | Facility: CLINIC | Age: 65
End: 2020-09-03
Payer: COMMERCIAL

## 2020-09-03 DIAGNOSIS — R19.4 CHANGE IN BOWEL HABITS: ICD-10-CM

## 2020-09-03 DIAGNOSIS — R14.0 BLOATING: ICD-10-CM

## 2020-09-03 PROCEDURE — 83516 IMMUNOASSAY NONANTIBODY: CPT

## 2020-09-03 PROCEDURE — 82784 ASSAY IGA/IGD/IGG/IGM EACH: CPT

## 2020-09-03 PROCEDURE — 36415 COLL VENOUS BLD VENIPUNCTURE: CPT

## 2020-09-03 PROCEDURE — 86255 FLUORESCENT ANTIBODY SCREEN: CPT

## 2020-09-03 PROCEDURE — 86003 ALLG SPEC IGE CRUDE XTRC EA: CPT

## 2020-09-03 PROCEDURE — 82785 ASSAY OF IGE: CPT

## 2020-09-04 LAB
ALLERGEN COMMENT: NORMAL
ALMOND IGE QN: <0.1 KUA/I
CASHEW NUT IGE QN: <0.1 KUA/I
CODFISH IGE QN: <0.1 KUA/I
EGG WHITE IGE QN: <0.1 KUA/I
ENDOMYSIUM IGA SER QL: NEGATIVE
GLIADIN PEPTIDE IGA SER-ACNC: 3 UNITS (ref 0–19)
GLIADIN PEPTIDE IGG SER-ACNC: 2 UNITS (ref 0–19)
GLUTEN IGE QN: <0.1 KUA/I
HAZELNUT IGE QN: <0.1 KUA/L
IGA SERPL-MCNC: 133 MG/DL (ref 87–352)
MILK IGE QN: <0.1 KUA/I
PEANUT IGE QN: <0.1 KUA/I
SALMON IGE QN: <0.1 KUA/I
SCALLOP IGE QN: <0.1 KUA/L
SESAME SEED IGE QN: <0.1 KUA/I
SHRIMP IGE QN: <0.1 KUA/L
SOYBEAN IGE QN: <0.1 KUA/I
TOTAL IGE SMQN RAST: 39.5 KU/L (ref 0–113)
TTG IGA SER-ACNC: <2 U/ML (ref 0–3)
TTG IGG SER-ACNC: <2 U/ML (ref 0–5)
TUNA IGE QN: <0.1 KUA/I
WALNUT IGE QN: <0.1 KUA/I
WHEAT IGE QN: <0.1 KUA/I

## 2020-09-08 DIAGNOSIS — R10.13 EPIGASTRIC PAIN: ICD-10-CM

## 2020-09-08 DIAGNOSIS — K21.9 GASTROESOPHAGEAL REFLUX DISEASE WITHOUT ESOPHAGITIS: ICD-10-CM

## 2020-09-08 DIAGNOSIS — R11.2 NAUSEA AND VOMITING: ICD-10-CM

## 2020-09-08 RX ORDER — SUCRALFATE ORAL 1 G/10ML
1000 SUSPENSION ORAL EVERY 6 HOURS SCHEDULED
Qty: 420 ML | Refills: 5 | Status: SHIPPED | OUTPATIENT
Start: 2020-09-08 | End: 2020-10-06 | Stop reason: ALTCHOICE

## 2020-09-08 RX ORDER — FAMOTIDINE 20 MG/1
20 TABLET, FILM COATED ORAL DAILY
Qty: 30 TABLET | Refills: 5 | Status: SHIPPED | OUTPATIENT
Start: 2020-09-08 | End: 2020-11-12 | Stop reason: SDUPTHER

## 2020-09-08 NOTE — TELEPHONE ENCOUNTER
GI Physician: Dr Connie Paredes    Refill Request for Medication: pepcid     Dose: 20 mg    Quantity: 30 tabs    [facility]      GI Physician: Dr Connie Paredes    Refill Request for Medication: carafate    Dose: 1 g    Quantity: 420 ml    Pharmacy and Location:  [facility]

## 2020-09-10 ENCOUNTER — TELEPHONE (OUTPATIENT)
Dept: GASTROENTEROLOGY | Facility: CLINIC | Age: 65
End: 2020-09-10

## 2020-09-10 ENCOUNTER — HOSPITAL ENCOUNTER (OUTPATIENT)
Dept: RADIOLOGY | Facility: HOSPITAL | Age: 65
Discharge: HOME | End: 2020-09-10
Payer: COMMERCIAL

## 2020-09-10 DIAGNOSIS — R14.0 BLOATING: ICD-10-CM

## 2020-09-10 DIAGNOSIS — R68.81 EARLY SATIETY: ICD-10-CM

## 2020-09-10 DIAGNOSIS — R11.2 NON-INTRACTABLE VOMITING WITH NAUSEA, UNSPECIFIED VOMITING TYPE: ICD-10-CM

## 2020-09-10 PROCEDURE — G1004 CDSM NDSC: HCPCS

## 2020-09-10 PROCEDURE — A9541 TC99M SULFUR COLLOID: HCPCS

## 2020-09-10 PROCEDURE — 78264 GASTRIC EMPTYING IMG STUDY: CPT

## 2020-09-10 NOTE — TELEPHONE ENCOUNTER
----- Message from Angela Hogan PA-C sent at 9/4/2020  6:09 PM EDT -----  Please inform patient that food allergy panel is negative

## 2020-09-11 DIAGNOSIS — K21.9 GASTROESOPHAGEAL REFLUX DISEASE, ESOPHAGITIS PRESENCE NOT SPECIFIED: Primary | ICD-10-CM

## 2020-09-11 RX ORDER — OMEPRAZOLE 40 MG/1
40 CAPSULE, DELAYED RELEASE ORAL 2 TIMES DAILY
Qty: 60 CAPSULE | Refills: 2 | Status: SHIPPED | OUTPATIENT
Start: 2020-09-11 | End: 2020-12-18

## 2020-09-14 ENCOUNTER — TELEPHONE (OUTPATIENT)
Dept: GASTROENTEROLOGY | Facility: AMBULARY SURGERY CENTER | Age: 65
End: 2020-09-14

## 2020-09-22 ENCOUNTER — ANESTHESIA EVENT (OUTPATIENT)
Dept: GASTROENTEROLOGY | Facility: AMBULARY SURGERY CENTER | Age: 65
End: 2020-09-22

## 2020-10-03 ENCOUNTER — TELEPHONE (OUTPATIENT)
Dept: GASTROENTEROLOGY | Facility: CLINIC | Age: 65
End: 2020-10-03

## 2020-10-06 ENCOUNTER — ANESTHESIA (OUTPATIENT)
Dept: GASTROENTEROLOGY | Facility: AMBULARY SURGERY CENTER | Age: 65
End: 2020-10-06

## 2020-10-06 ENCOUNTER — HOSPITAL ENCOUNTER (OUTPATIENT)
Dept: GASTROENTEROLOGY | Facility: AMBULARY SURGERY CENTER | Age: 65
Setting detail: OUTPATIENT SURGERY
Discharge: HOME | End: 2020-10-06
Attending: INTERNAL MEDICINE | Admitting: INTERNAL MEDICINE
Payer: COMMERCIAL

## 2020-10-06 VITALS
TEMPERATURE: 97.7 F | OXYGEN SATURATION: 96 % | BODY MASS INDEX: 27.23 KG/M2 | DIASTOLIC BLOOD PRESSURE: 58 MMHG | RESPIRATION RATE: 18 BRPM | WEIGHT: 148 LBS | SYSTOLIC BLOOD PRESSURE: 105 MMHG | HEIGHT: 62 IN | HEART RATE: 65 BPM

## 2020-10-06 VITALS — HEART RATE: 65 BPM

## 2020-10-06 DIAGNOSIS — Z86.010 HISTORY OF COLON POLYPS: ICD-10-CM

## 2020-10-06 DIAGNOSIS — R19.4 CHANGE IN BOWEL HABITS: ICD-10-CM

## 2020-10-06 DIAGNOSIS — R14.0 BLOATING: ICD-10-CM

## 2020-10-06 PROCEDURE — 88305 TISSUE EXAM BY PATHOLOGIST: CPT | Performed by: PATHOLOGY

## 2020-10-06 PROCEDURE — 45380 COLONOSCOPY AND BIOPSY: CPT | Performed by: INTERNAL MEDICINE

## 2020-10-06 RX ORDER — PROPOFOL 10 MG/ML
INJECTION, EMULSION INTRAVENOUS AS NEEDED
Status: DISCONTINUED | OUTPATIENT
Start: 2020-10-06 | End: 2020-10-06

## 2020-10-06 RX ORDER — SODIUM CHLORIDE, SODIUM LACTATE, POTASSIUM CHLORIDE, CALCIUM CHLORIDE 600; 310; 30; 20 MG/100ML; MG/100ML; MG/100ML; MG/100ML
125 INJECTION, SOLUTION INTRAVENOUS CONTINUOUS
Status: DISCONTINUED | OUTPATIENT
Start: 2020-10-06 | End: 2020-10-10 | Stop reason: HOSPADM

## 2020-10-06 RX ORDER — SODIUM CHLORIDE, SODIUM LACTATE, POTASSIUM CHLORIDE, CALCIUM CHLORIDE 600; 310; 30; 20 MG/100ML; MG/100ML; MG/100ML; MG/100ML
INJECTION, SOLUTION INTRAVENOUS CONTINUOUS PRN
Status: DISCONTINUED | OUTPATIENT
Start: 2020-10-06 | End: 2020-10-06

## 2020-10-06 RX ORDER — ONDANSETRON 2 MG/ML
4 INJECTION INTRAMUSCULAR; INTRAVENOUS ONCE AS NEEDED
Status: CANCELLED | OUTPATIENT
Start: 2020-10-06

## 2020-10-06 RX ADMIN — SODIUM CHLORIDE, SODIUM LACTATE, POTASSIUM CHLORIDE, AND CALCIUM CHLORIDE: .6; .31; .03; .02 INJECTION, SOLUTION INTRAVENOUS at 10:50

## 2020-10-06 RX ADMIN — PROPOFOL 50 MG: 10 INJECTION, EMULSION INTRAVENOUS at 10:59

## 2020-10-06 RX ADMIN — PROPOFOL 50 MG: 10 INJECTION, EMULSION INTRAVENOUS at 10:56

## 2020-10-06 RX ADMIN — PROPOFOL 120 MG: 10 INJECTION, EMULSION INTRAVENOUS at 10:52

## 2020-10-13 ENCOUNTER — TELEPHONE (OUTPATIENT)
Dept: GASTROENTEROLOGY | Facility: CLINIC | Age: 65
End: 2020-10-13

## 2020-11-12 ENCOUNTER — OFFICE VISIT (OUTPATIENT)
Dept: GASTROENTEROLOGY | Facility: AMBULARY SURGERY CENTER | Age: 65
End: 2020-11-12
Payer: COMMERCIAL

## 2020-11-12 VITALS — TEMPERATURE: 97.5 F | WEIGHT: 153.8 LBS | BODY MASS INDEX: 28.3 KG/M2 | HEIGHT: 62 IN

## 2020-11-12 DIAGNOSIS — K59.09 OTHER CONSTIPATION: Primary | ICD-10-CM

## 2020-11-12 DIAGNOSIS — K21.9 GASTROESOPHAGEAL REFLUX DISEASE WITHOUT ESOPHAGITIS: ICD-10-CM

## 2020-11-12 DIAGNOSIS — K31.84 GASTROPARESIS: ICD-10-CM

## 2020-11-12 PROCEDURE — 99214 OFFICE O/P EST MOD 30 MIN: CPT | Performed by: PHYSICIAN ASSISTANT

## 2020-11-12 RX ORDER — SUCRALFATE ORAL 1 G/10ML
1 SUSPENSION ORAL 4 TIMES DAILY
COMMUNITY
End: 2022-07-01

## 2020-11-12 RX ORDER — FAMOTIDINE 20 MG/1
20 TABLET, FILM COATED ORAL 2 TIMES DAILY
Qty: 60 TABLET | Refills: 5 | Status: SHIPPED | OUTPATIENT
Start: 2020-11-12 | End: 2021-11-01

## 2020-12-18 DIAGNOSIS — K21.9 GASTROESOPHAGEAL REFLUX DISEASE: ICD-10-CM

## 2020-12-18 RX ORDER — OMEPRAZOLE 40 MG/1
CAPSULE, DELAYED RELEASE ORAL
Qty: 180 CAPSULE | Refills: 1 | Status: SHIPPED | OUTPATIENT
Start: 2020-12-18 | End: 2021-02-24 | Stop reason: SDUPTHER

## 2021-02-24 ENCOUNTER — OFFICE VISIT (OUTPATIENT)
Dept: GASTROENTEROLOGY | Facility: AMBULARY SURGERY CENTER | Age: 66
End: 2021-02-24
Payer: COMMERCIAL

## 2021-02-24 VITALS — BODY MASS INDEX: 27.97 KG/M2 | WEIGHT: 152 LBS | HEIGHT: 62 IN | RESPIRATION RATE: 16 BRPM

## 2021-02-24 DIAGNOSIS — K21.9 GASTROESOPHAGEAL REFLUX DISEASE: ICD-10-CM

## 2021-02-24 DIAGNOSIS — K31.84 GASTROPARESIS: Primary | ICD-10-CM

## 2021-02-24 PROCEDURE — 99213 OFFICE O/P EST LOW 20 MIN: CPT | Performed by: INTERNAL MEDICINE

## 2021-02-24 RX ORDER — OMEPRAZOLE 40 MG/1
40 CAPSULE, DELAYED RELEASE ORAL 2 TIMES DAILY
Qty: 180 CAPSULE | Refills: 2 | Status: SHIPPED | OUTPATIENT
Start: 2021-02-24 | End: 2022-01-29

## 2021-02-24 NOTE — LETTER
February 24, 2021     [address]  [address] [place] [address]    Patient: Giselle Pate   YOB: 1955   Date of Visit: 2/24/2021       Dear Dr Verner Seeds: Thank you for referring Giselle Pate to me for evaluation  Below are my notes for this consultation  If you have questions, please do not hesitate to call me  I look forward to following your patient along with you  Sincerely,        Noemi Sanon MD        CC: No Recipients  Noemi Sanon MD  2/24/2021 12:46 PM  Sign when Signing Visit  [facility]  Giselle Pate 76 y o  female MRN: [id number]            Assessment & Plan:    Pleasant 25-year-old female, here for follow-up of gastroparesis, has a history of reflux  1  Gastroparesis:  Associated with abdominal pain and bloating, likely idiopathic  - may be precipitated by hypothyroidism   - continue low residue diet   - patient was not doing small frequent meals, suggested that this may help with some of her dizziness  - given that she has significant symptoms several times per mother recommend that she take Reglan as needed  - discussed other alternatives including Botox injection, domperidone  -she was reluctant to use Reglan due to potential side effects   -she was instructed to give us a call with any change or worsening symptoms, if she has progressive a more frequent symptoms will proceed with an EGD with Botox  -continue PPI therapy and H2 blocker  - patient is up-to-date otherwise with EGD and colonoscopy              _____________________________________________________________        CC: Follow-up of gastroparesis    HPI:  Giselle Pate is a 76 y  o female who is here for  Follow-up of gastroparesis  This is a 25-year-old female, reports longstanding history of postprandial epigastric pain and discomfort, ultimately had evaluation which demonstrated significantly delayed gastric emptying    The patient since her last visit has been trying to maintain her diet with low residue diet, small frequent meals  She reports that relatively she does okay but she feels very restricted with her diet  She has been taking omeprazole in the morning, famotidine evening with reasonable control her reflux symptoms  She does note that recently after some mild dietary indiscretion she has had severe abdominal pain and discomfort  Occasionally after eating she has significant bloating with nausea, occasionally she reports some dizziness associated with nausea and low body temperature is as well  Bowel movements have been fairly regular, she takes MiraLax and this keeps her regular in addition to a daily fiber supplement  She notes that when she was taking Benefiber in the past this caused her to have significant abdominal bloating and discomfort  She denies any vomiting  ROS:  The remainder of the ROS was negative except for the pertinent positives mentioned in HPI        Allergies: Sulfa antibiotics and Medical tape    Medications:   Current Outpatient Medications:     aluminum-magnesium hydroxide-simethicone (MYLANTA) 200-200-20 mg/5 mL suspension, Take 30 mL by mouth every 4 (four) hours as needed for indigestion or heartburn, Disp: 355 mL, Rfl: 0    amLODIPine (NORVASC) 5 mg tablet, Take 1 tablet (5 mg total) by mouth daily, Disp: 30 tablet, Rfl: 0    famotidine (PEPCID) 20 mg tablet, Take 1 tablet (20 mg total) by mouth 2 (two) times a day, Disp: 60 tablet, Rfl: 5    levothyroxine 75 mcg tablet, Take 75 mcg by mouth daily, Disp: , Rfl:     meclizine (ANTIVERT) 25 mg tablet, Take 1 tablet (25 mg total) by mouth 3 (three) times a day as needed for dizziness, Disp: 30 tablet, Rfl: 0    omeprazole (PriLOSEC) 40 MG capsule, Take 1 capsule (40 mg total) by mouth 2 (two) times a day, Disp: 180 capsule, Rfl: 2    AMOXICILLIN PO, Take by mouth For ear infection, Disp: , Rfl:     metoclopramide (REGLAN) 5 mg tablet, Take 1 tablet (5 mg total) by mouth every 6 (six) hours as needed (Bloating and nausea ) (Patient not taking: Reported on 11/12/2020), Disp: 30 tablet, Rfl: 0    sucralfate (CARAFATE) 1 g/10 mL suspension, Take 1 g by mouth 4 (four) times a day, Disp: , Rfl:     Past Medical History:   Diagnosis Date    Colon polyp     Disease of thyroid gland     GERD (gastroesophageal reflux disease)     Hypertension     Vertigo        Past Surgical History:   Procedure Laterality Date    COLONOSCOPY      GALLBLADDER SURGERY      OOPHORECTOMY      UPPER GASTROINTESTINAL ENDOSCOPY         Family History   Problem Relation Age of Onset    Squamous cell carcinoma Daughter         reports that she has never smoked  She has never used smokeless tobacco  She reports previous alcohol use  She reports that she does not use drugs        Physical Exam:    Resp 16   Ht 5' 2" (1 575 m)   Wt 68 9 kg (152 lb)   BMI 27 80 kg/m²     Gen: wn/wd, NAD , healthy-appearing female  HEENT: anicteric, MMM, no cervical LAD  CVS: RRR, no m/r/g  CHEST: CTA b/l  ABD: +BS, soft, NT,ND, no hepatosplenomegaly  EXT: no c/c/e  NEURO: aaox3  SKIN: NO rashes

## 2021-02-24 NOTE — PROGRESS NOTES
[facility]  Hayder Colon 76 y o  female MRN: [id number]            Assessment & Plan:    Andreina 68-year-old female, here for follow-up of gastroparesis, has a history of reflux  1  Gastroparesis:  Associated with abdominal pain and bloating, likely idiopathic  - may be precipitated by hypothyroidism   - continue low residue diet   - patient was not doing small frequent meals, suggested that this may help with some of her dizziness  - given that she has significant symptoms several times per mother recommend that she take Reglan as needed  - discussed other alternatives including Botox injection, domperidone  -she was reluctant to use Reglan due to potential side effects   -she was instructed to give us a call with any change or worsening symptoms, if she has progressive a more frequent symptoms will proceed with an EGD with Botox  -continue PPI therapy and H2 blocker  - patient is up-to-date otherwise with EGD and colonoscopy              _____________________________________________________________        CC: Follow-up of gastroparesis    HPI:  Hayder Colon is a 76 y  o female who is here for  Follow-up of gastroparesis  This is a 68-year-old female, reports longstanding history of postprandial epigastric pain and discomfort, ultimately had evaluation which demonstrated significantly delayed gastric emptying  The patient since her last visit has been trying to maintain her diet with low residue diet, small frequent meals  She reports that relatively she does okay but she feels very restricted with her diet  She has been taking omeprazole in the morning, famotidine evening with reasonable control her reflux symptoms  She does note that recently after some mild dietary indiscretion she has had severe abdominal pain and discomfort    Occasionally after eating she has significant bloating with nausea, occasionally she reports some dizziness associated with nausea and low body temperature is as well  Bowel movements have been fairly regular, she takes MiraLax and this keeps her regular in addition to a daily fiber supplement  She notes that when she was taking Benefiber in the past this caused her to have significant abdominal bloating and discomfort  She denies any vomiting  ROS:  The remainder of the ROS was negative except for the pertinent positives mentioned in HPI        Allergies: Sulfa antibiotics and Medical tape    Medications:   Current Outpatient Medications:     aluminum-magnesium hydroxide-simethicone (MYLANTA) 200-200-20 mg/5 mL suspension, Take 30 mL by mouth every 4 (four) hours as needed for indigestion or heartburn, Disp: 355 mL, Rfl: 0    amLODIPine (NORVASC) 5 mg tablet, Take 1 tablet (5 mg total) by mouth daily, Disp: 30 tablet, Rfl: 0    famotidine (PEPCID) 20 mg tablet, Take 1 tablet (20 mg total) by mouth 2 (two) times a day, Disp: 60 tablet, Rfl: 5    levothyroxine 75 mcg tablet, Take 75 mcg by mouth daily, Disp: , Rfl:     meclizine (ANTIVERT) 25 mg tablet, Take 1 tablet (25 mg total) by mouth 3 (three) times a day as needed for dizziness, Disp: 30 tablet, Rfl: 0    omeprazole (PriLOSEC) 40 MG capsule, Take 1 capsule (40 mg total) by mouth 2 (two) times a day, Disp: 180 capsule, Rfl: 2    AMOXICILLIN PO, Take by mouth For ear infection, Disp: , Rfl:     metoclopramide (REGLAN) 5 mg tablet, Take 1 tablet (5 mg total) by mouth every 6 (six) hours as needed (Bloating and nausea ) (Patient not taking: Reported on 11/12/2020), Disp: 30 tablet, Rfl: 0    sucralfate (CARAFATE) 1 g/10 mL suspension, Take 1 g by mouth 4 (four) times a day, Disp: , Rfl:     Past Medical History:   Diagnosis Date    Colon polyp     Disease of thyroid gland     GERD (gastroesophageal reflux disease)     Hypertension     Vertigo        Past Surgical History:   Procedure Laterality Date    COLONOSCOPY      GALLBLADDER SURGERY      OOPHORECTOMY      UPPER GASTROINTESTINAL ENDOSCOPY         Family History   Problem Relation Age of Onset    Squamous cell carcinoma Daughter         reports that she has never smoked  She has never used smokeless tobacco  She reports previous alcohol use  She reports that she does not use drugs        Physical Exam:    Resp 16   Ht 5' 2" (1 575 m)   Wt 68 9 kg (152 lb)   BMI 27 80 kg/m²     Gen: wn/wd, NAD , healthy-appearing female  HEENT: anicteric, MMM, no cervical LAD  CVS: RRR, no m/r/g  CHEST: CTA b/l  ABD: +BS, soft, NT,ND, no hepatosplenomegaly  EXT: no c/c/e  NEURO: aaox3  SKIN: NO rashes

## 2021-10-26 DIAGNOSIS — K21.9 GASTROESOPHAGEAL REFLUX DISEASE WITHOUT ESOPHAGITIS: ICD-10-CM

## 2021-10-26 DIAGNOSIS — K31.84 GASTROPARESIS: ICD-10-CM

## 2021-11-01 RX ORDER — FAMOTIDINE 20 MG/1
TABLET, FILM COATED ORAL
Qty: 180 TABLET | Refills: 1 | Status: SHIPPED | OUTPATIENT
Start: 2021-11-01 | End: 2022-07-01

## 2022-01-29 DIAGNOSIS — K21.9 GASTROESOPHAGEAL REFLUX DISEASE: ICD-10-CM

## 2022-01-29 RX ORDER — OMEPRAZOLE 40 MG/1
CAPSULE, DELAYED RELEASE ORAL
Qty: 180 CAPSULE | Refills: 2 | Status: SHIPPED | OUTPATIENT
Start: 2022-01-29 | End: 2022-07-01

## 2022-04-27 DIAGNOSIS — K21.9 GASTROESOPHAGEAL REFLUX DISEASE WITHOUT ESOPHAGITIS: ICD-10-CM

## 2022-04-27 DIAGNOSIS — K31.84 GASTROPARESIS: ICD-10-CM

## 2022-04-27 RX ORDER — FAMOTIDINE 20 MG/1
TABLET, FILM COATED ORAL
Qty: 180 TABLET | Refills: 1 | OUTPATIENT
Start: 2022-04-27

## 2022-07-01 ENCOUNTER — OFFICE VISIT (OUTPATIENT)
Dept: GASTROENTEROLOGY | Facility: AMBULARY SURGERY CENTER | Age: 67
End: 2022-07-01
Payer: COMMERCIAL

## 2022-07-01 VITALS
DIASTOLIC BLOOD PRESSURE: 78 MMHG | WEIGHT: 154.2 LBS | HEIGHT: 62 IN | BODY MASS INDEX: 28.37 KG/M2 | HEART RATE: 71 BPM | SYSTOLIC BLOOD PRESSURE: 110 MMHG | OXYGEN SATURATION: 99 %

## 2022-07-01 DIAGNOSIS — K21.9 GASTROESOPHAGEAL REFLUX DISEASE WITHOUT ESOPHAGITIS: ICD-10-CM

## 2022-07-01 DIAGNOSIS — Z12.11 COLON CANCER SCREENING: ICD-10-CM

## 2022-07-01 DIAGNOSIS — K31.84 GASTROPARESIS: Primary | ICD-10-CM

## 2022-07-01 DIAGNOSIS — K59.09 OTHER CONSTIPATION: ICD-10-CM

## 2022-07-01 PROCEDURE — 99214 OFFICE O/P EST MOD 30 MIN: CPT | Performed by: PHYSICIAN ASSISTANT

## 2022-07-01 RX ORDER — ROSUVASTATIN CALCIUM 5 MG/1
5 TABLET, COATED ORAL DAILY
COMMUNITY
Start: 2022-06-17 | End: 2022-07-01

## 2022-07-01 NOTE — PROGRESS NOTES
Assessment and Plan    #1  Gastroparesis: well controlled with diet and supportive care, not on any prescription meds for this  No vomiting, does get some bloating and gas  -small frequent meals, low residue foods  -can use beano or gas-x as needed  -can try a peppermint supplement 1-2 capsules daily  -if symptoms worsening, can consider EGD with botox, patient has not wanted to use reglan in the past due to side effect profile    -follow up in 1 year, sooner if needed    #2  Constipation: mild, occasional, responds well to miralax  -fiber supplementation  -good hydration  -miralax as needed    #3  GERD: well controlled, stopped PPI and pepcid, uses tums as needed  -advised to take pepcid as needed for symptoms  -anti-reflux diet and measures    #4  Colon cancer screening  -repeat due in 2030  -------------------------------------------------------------------------------------------------------------------  Chief Complaint: f/u gastroparesis, GERD    HPI: Marlea Canavan is a 75 y o  female with history of hypothyroidism, GERD, HLD, gastroparesis who presents today for yearly follow up  She has stopped her PPI and pepcid  Reports doing relatively well  States she does still get bloating and gas at times but denies nausea or vomiting  Acid reflux is generally well controlled  Takes one tums before bedtime  Has had 2 episodes of regurgitation in last 3-4 months  No esophageal dysphagia  Bowels pretty regular  She has occasional constipation and will take miralax for 1-2 days and then this resolves  Denies weight loss, blood in stool, black stools  Her last colonoscopy and endoscopy was in 2020  She is due for repeat colonoscopy in 2030       Review of Systems:   General: negative for fatigue, fever, night sweats or unexpected weight loss  Psychological: negative for anxiety or depression  Ophthalmic: negative for blurry vision or scleral icterus  ENT: negative for headaches, oral lesions, sore throat, vocal changes or dysphagia  Hematological and Lymphatic: negative for pallor or swollen lymph nodes  Respiratory: negative for cough, shortness of breath or wheezing  Cardiovascular: negative for chest pain, edema or murmur  Gastrointestinal: as mentioned in HPI  Genito-Urinary: negative for dysuria or incontinence  Musculoskeletal: negative for joint pain, joint stiffness or joint swelling  Dermatological: negative for pruritus, rash, or jaundice    Current Medications  Current Outpatient Medications   Medication Sig Dispense Refill    Alpha-D-Galactosidase (BEANO PO) Take by mouth      amLODIPine (NORVASC) 5 mg tablet Take 1 tablet (5 mg total) by mouth daily 30 tablet 0    DIGESTIVE ENZYMES PO Take by mouth      levothyroxine 75 mcg tablet Take 75 mcg by mouth daily      Magnesium Gluconate (MAGNESIUM 27 PO) Take by mouth      Multiple Vitamin (MULTIVITAMIN ADULT PO) Take by mouth      VITAMIN D PO Take by mouth      aluminum-magnesium hydroxide-simethicone (MYLANTA) 200-200-20 mg/5 mL suspension Take 30 mL by mouth every 4 (four) hours as needed for indigestion or heartburn (Patient not taking: Reported on 7/1/2022) 355 mL 0    AMOXICILLIN PO Take by mouth For ear infection (Patient not taking: Reported on 7/1/2022)      famotidine (PEPCID) 20 mg tablet TAKE 1 TABLET BY MOUTH TWICE A DAY (Patient not taking: Reported on 7/1/2022) 180 tablet 1    meclizine (ANTIVERT) 25 mg tablet Take 1 tablet (25 mg total) by mouth 3 (three) times a day as needed for dizziness (Patient not taking: Reported on 7/1/2022) 30 tablet 0    metoclopramide (REGLAN) 5 mg tablet Take 1 tablet (5 mg total) by mouth every 6 (six) hours as needed (Bloating and nausea ) (Patient not taking: No sig reported) 30 tablet 0    omeprazole (PriLOSEC) 40 MG capsule TAKE 1 CAPSULE BY MOUTH TWICE A DAY (Patient not taking: Reported on 7/1/2022) 180 capsule 2    rosuvastatin (CRESTOR) 5 mg tablet Take 5 mg by mouth daily (Patient not taking: Reported on 7/1/2022)      sucralfate (CARAFATE) 1 g/10 mL suspension Take 1 g by mouth 4 (four) times a day (Patient not taking: Reported on 7/1/2022)       No current facility-administered medications for this visit         Past Medical History  Past Medical History:   Diagnosis Date    Colon polyp     Disease of thyroid gland     GERD (gastroesophageal reflux disease)     Hypertension     Vertigo        Past Surgical History  Past Surgical History:   Procedure Laterality Date    COLONOSCOPY      GALLBLADDER SURGERY      OOPHORECTOMY      UPPER GASTROINTESTINAL ENDOSCOPY         Past Social History   Social History     Socioeconomic History    Marital status: /Civil Union     Spouse name: None    Number of children: None    Years of education: None    Highest education level: None   Occupational History    None   Tobacco Use    Smoking status: Never Smoker    Smokeless tobacco: Never Used   Vaping Use    Vaping Use: Never used   Substance and Sexual Activity    Alcohol use: Not Currently    Drug use: Never    Sexual activity: None   Other Topics Concern    None   Social History Narrative    None     Social Determinants of Health     Financial Resource Strain: Not on file   Food Insecurity: Not on file   Transportation Needs: Not on file   Physical Activity: Not on file   Stress: Not on file   Social Connections: Not on file   Intimate Partner Violence: Not on file   Housing Stability: Not on file       The following portions of the patient's history were reviewed and updated as appropriate: allergies, current medications, past family history, past medical history, past social history, past surgical history and problem list     Vital Signs  Vitals:    07/01/22 1049   BP: 110/78   BP Location: Right arm   Patient Position: Sitting   Cuff Size: Standard   Pulse: 71   SpO2: 99%   Weight: 69 9 kg (154 lb 3 2 oz)   Height: 5' 2" (1 575 m)       Physical Exam:  General appearance: alert, cooperative, no distress  HEENT: normocephalic, anicteric, no eye erythema or discharge, no oropharyngeal thrush  Neck: supple, trachea midline, no adenopathy  Lungs: CTA b/l, no rales, rhonchi, or wheezing, unlabored respirations  Heart: RRR, no murmur, rubs, or gallops  Abdomen: soft, obese abdomen,  non-tender, non-distended, normal bowel sounds, no masses or organomegaly  Rectal: deferred  Extremities: no cyanosis, clubbing, or edema  Musculoskeletal: normal gait  Skin: color and texture normal, no jaundice, no rashes or lesions  Psychiatric: alert and oriented, normal affect and behavior

## 2023-09-06 ENCOUNTER — OFFICE VISIT (OUTPATIENT)
Dept: GASTROENTEROLOGY | Facility: AMBULARY SURGERY CENTER | Age: 68
End: 2023-09-06
Payer: COMMERCIAL

## 2023-09-06 VITALS
HEART RATE: 69 BPM | SYSTOLIC BLOOD PRESSURE: 126 MMHG | OXYGEN SATURATION: 96 % | BODY MASS INDEX: 28.23 KG/M2 | WEIGHT: 153.4 LBS | HEIGHT: 62 IN | DIASTOLIC BLOOD PRESSURE: 80 MMHG

## 2023-09-06 DIAGNOSIS — K31.84 GASTROPARESIS: Primary | ICD-10-CM

## 2023-09-06 DIAGNOSIS — K21.9 GASTROESOPHAGEAL REFLUX DISEASE WITHOUT ESOPHAGITIS: ICD-10-CM

## 2023-09-06 DIAGNOSIS — K59.09 CHRONIC CONSTIPATION: ICD-10-CM

## 2023-09-06 DIAGNOSIS — Z12.11 COLON CANCER SCREENING: ICD-10-CM

## 2023-09-06 PROCEDURE — 99214 OFFICE O/P EST MOD 30 MIN: CPT | Performed by: PHYSICIAN ASSISTANT

## 2023-09-06 RX ORDER — FAMOTIDINE 20 MG/1
TABLET, FILM COATED ORAL
COMMUNITY
Start: 2023-08-29

## 2023-09-06 RX ORDER — PRAVASTATIN SODIUM 20 MG
TABLET ORAL
COMMUNITY
Start: 2023-06-14

## 2023-09-06 NOTE — LETTER
September 6, 2023     [address] [place], [place] [address]db[facility]  [address] [facility]    Patient: Mira Petty   YOB: 1955   Date of Visit: 9/6/2023       Dear Dr. Jackie Roberts: Thank you for referring Mira Petty to me for evaluation. Below are my notes for this consultation. If you have questions, please do not hesitate to call me. I look forward to following your patient along with you. Sincerely,        Sofi Shine PA-C        CC: No Recipients    Sofi Shine PA-C  9/6/2023 11:54 AM  Incomplete  Assessment and Plan    #1. Gastroparesis: well controlled with diet, small frequent meals. Does get bloating at times but no nausea or vomiting.   -continue with diet modification to control gastroparesis  -continue digestive enzymes  -call with any worsening symptoms    #2. GERD: relatively well controlled on Pepcid 20 mg BID  -continue pepcid BID and tums as needed  -anti-reflux diet and measures  -call with any worsening of symptoms are could do a short course PPI    #3. Constipation: takes miralax as needed  -continue miralax as needed  -had significant bloating with fiber supplement so stopped this    #4. Colon cancer screening  -repeat colonoscopy in 2030.    1 year follow up, sooner if needed    --------------------------------------------------------------------------------------------------------------------    Chief Complaint: f/u gastroparesis, GERD    HPI: Mira Petty is a 70 y.o. female with a history of gastroparesis, GERD, hypothyroidism, hyperlipidemia, constipation, recent bladder and uterine prolapse with pessary in place who presents today for follow up. She was last seen approximately 1 year ago. Her symptoms been relatively stable. She suffers from gastroparesis but controls this well with her diet and small frequent meals. Denies any nausea or vomiting. Does get some bloating at times. She is taking digestive enzymes which she thinks helps.   She reports that her constipation is relatively well controlled with MiraLAX as needed. Denies any blood in the stool or black tarry stools. Her acid reflux is well controlled on Pepcid 20 mg daily and Tums as needed. She was able to wean off her PPI. Denies any significant dysphagia symptoms. Last colonoscopy was in 2020 and was unremarkable. Recommended for repeat in 10 years. Review of Systems:   General: negative for fatigue, fever, night sweats or unexpected weight loss  Psychological: negative for anxiety or depression  Ophthalmic: negative for blurry vision or scleral icterus  ENT: negative for headaches, oral lesions, sore throat, vocal changes or dysphagia  Hematological and Lymphatic: negative for pallor or swollen lymph nodes  Respiratory: negative for cough, shortness of breath or wheezing  Cardiovascular: negative for chest pain, edema or murmur  Gastrointestinal: as mentioned in HPI  Genito-Urinary: negative for dysuria or incontinence  Musculoskeletal: negative for joint pain, joint stiffness or joint swelling  Dermatological: negative for pruritus, rash, or jaundice    Current Medications  Current Outpatient Medications   Medication Sig Dispense Refill   • Alpha-D-Galactosidase (BEANO PO) Take by mouth     • amLODIPine (NORVASC) 5 mg tablet Take 1 tablet (5 mg total) by mouth daily 30 tablet 0   • DIGESTIVE ENZYMES PO Take by mouth     • famotidine (PEPCID) 20 mg tablet      • levothyroxine 75 mcg tablet Take 75 mcg by mouth daily     • Magnesium Gluconate (MAGNESIUM 27 PO) Take by mouth     • Multiple Vitamin (MULTIVITAMIN ADULT PO) Take by mouth     • VITAMIN D PO Take by mouth     • pravastatin (PRAVACHOL) 20 mg tablet TAKE 1 TABLET BY MOUTH EVERY DAY AT NIGHT (Patient not taking: Reported on 9/6/2023)       No current facility-administered medications for this visit.        Past Medical History  Past Medical History:   Diagnosis Date   • Colon polyp    • Disease of thyroid gland    • GERD (gastroesophageal reflux disease)    • Hypertension    • Vertigo        Past Surgical History  Past Surgical History:   Procedure Laterality Date   • COLONOSCOPY     • GALLBLADDER SURGERY     • OOPHORECTOMY     • UPPER GASTROINTESTINAL ENDOSCOPY         Past Social History   Social History     Socioeconomic History   • Marital status: /Civil Union     Spouse name: None   • Number of children: None   • Years of education: None   • Highest education level: None   Occupational History   • None   Tobacco Use   • Smoking status: Never   • Smokeless tobacco: Never   Vaping Use   • Vaping Use: Never used   Substance and Sexual Activity   • Alcohol use: Not Currently   • Drug use: Never   • Sexual activity: None   Other Topics Concern   • None   Social History Narrative   • None     Social Determinants of Health     Financial Resource Strain: Not on file   Food Insecurity: Not on file   Transportation Needs: Not on file   Physical Activity: Not on file   Stress: Not on file   Social Connections: Not on file   Intimate Partner Violence: Not on file   Housing Stability: Not on file       The following portions of the patient's history were reviewed and updated as appropriate: allergies, current medications, past family history, past medical history, past social history, past surgical history and problem list.    Vital Signs  Vitals:    09/06/23 1127   BP: 126/80   BP Location: Right arm   Patient Position: Sitting   Cuff Size: Standard   Pulse: 69   SpO2: 96%   Weight: 69.6 kg (153 lb 6.4 oz)   Height: 5' 2" (1.575 m)       Physical Exam:  General appearance: alert, cooperative, no distress  HEENT: normocephalic, anicteric, no eye erythema or discharge, no oropharyngeal thrush  Neck: supple, trachea midline, no adenopathy  Lungs: CTA b/l, no rales, rhonchi, or wheezing, unlabored respirations  Heart: RRR, no murmur, rubs, or gallops  Abdomen: soft, non-tender, non-distended, normal bowel sounds, no masses or organomegaly  Rectal: deferred  Extremities: no cyanosis, clubbing, or edema  Musculoskeletal: normal gait  Skin: color and texture normal, no jaundice, no rashes or lesions  Psychiatric: alert and oriented, normal affect and behavior                                                                      Keyon Jean Baptiste PA-C  9/6/2023 11:53 AM  Sign when Signing Visit  Assessment and Plan    #1. Gastroparesis: well controlled with diet, small frequent meals. Does get bloating at times but no nausea or vomiting.   -continue with diet modification to control gastroparesis  -continue digestive enzymes  -call with any worsening symptoms    #2. GERD: relatively well controlled on Pepcid 20 mg BID  -continue pepcid BID and tums as needed  -anti-reflux diet and measures  -call with any worsening of symptoms are could do a short course PPI    #3. Constipation: takes miralax as needed  -continue miralax as needed  -had significant bloating with fiber supplement so stopped this    #4. Colon cancer screening  -repeat colonoscopy in 2030.   --------------------------------------------------------------------------------------------------------------------    Chief Complaint: f/u gastroparesis, GERD    HPI: Pete Dunham is a 70 y.o. female with a history of gastroparesis, GERD, hypothyroidism, hyperlipidemia, constipation, recent bladder and uterine prolapse with pessary in place who presents today for follow up. She was last seen approximately 1 year ago. Her symptoms been relatively stable. She suffers from gastroparesis but controls this well with her diet and small frequent meals. Denies any nausea or vomiting. Does get some bloating at times. She is taking digestive enzymes which she thinks helps. She reports that her constipation is relatively well controlled with MiraLAX as needed. Denies any blood in the stool or black tarry stools. Her acid reflux is well controlled on Pepcid 20 mg daily and Tums as needed.   She was able to wean off her PPI. Denies any significant dysphagia symptoms. Last colonoscopy was in 2020 and was unremarkable. Recommended for repeat in 10 years. Review of Systems:   General: negative for fatigue, fever, night sweats or unexpected weight loss  Psychological: negative for anxiety or depression  Ophthalmic: negative for blurry vision or scleral icterus  ENT: negative for headaches, oral lesions, sore throat, vocal changes or dysphagia  Hematological and Lymphatic: negative for pallor or swollen lymph nodes  Respiratory: negative for cough, shortness of breath or wheezing  Cardiovascular: negative for chest pain, edema or murmur  Gastrointestinal: as mentioned in HPI  Genito-Urinary: negative for dysuria or incontinence  Musculoskeletal: negative for joint pain, joint stiffness or joint swelling  Dermatological: negative for pruritus, rash, or jaundice    Current Medications  Current Outpatient Medications   Medication Sig Dispense Refill   • Alpha-D-Galactosidase (BEANO PO) Take by mouth     • amLODIPine (NORVASC) 5 mg tablet Take 1 tablet (5 mg total) by mouth daily 30 tablet 0   • DIGESTIVE ENZYMES PO Take by mouth     • famotidine (PEPCID) 20 mg tablet      • levothyroxine 75 mcg tablet Take 75 mcg by mouth daily     • Magnesium Gluconate (MAGNESIUM 27 PO) Take by mouth     • Multiple Vitamin (MULTIVITAMIN ADULT PO) Take by mouth     • VITAMIN D PO Take by mouth     • pravastatin (PRAVACHOL) 20 mg tablet TAKE 1 TABLET BY MOUTH EVERY DAY AT NIGHT (Patient not taking: Reported on 9/6/2023)       No current facility-administered medications for this visit.        Past Medical History  Past Medical History:   Diagnosis Date   • Colon polyp    • Disease of thyroid gland    • GERD (gastroesophageal reflux disease)    • Hypertension    • Vertigo        Past Surgical History  Past Surgical History:   Procedure Laterality Date   • COLONOSCOPY     • GALLBLADDER SURGERY     • OOPHORECTOMY     • UPPER GASTROINTESTINAL ENDOSCOPY         Past Social History   Social History     Socioeconomic History   • Marital status: /Civil Union     Spouse name: None   • Number of children: None   • Years of education: None   • Highest education level: None   Occupational History   • None   Tobacco Use   • Smoking status: Never   • Smokeless tobacco: Never   Vaping Use   • Vaping Use: Never used   Substance and Sexual Activity   • Alcohol use: Not Currently   • Drug use: Never   • Sexual activity: None   Other Topics Concern   • None   Social History Narrative   • None     Social Determinants of Health     Financial Resource Strain: Not on file   Food Insecurity: Not on file   Transportation Needs: Not on file   Physical Activity: Not on file   Stress: Not on file   Social Connections: Not on file   Intimate Partner Violence: Not on file   Housing Stability: Not on file       The following portions of the patient's history were reviewed and updated as appropriate: allergies, current medications, past family history, past medical history, past social history, past surgical history and problem list.    Vital Signs  Vitals:    09/06/23 1127   BP: 126/80   BP Location: Right arm   Patient Position: Sitting   Cuff Size: Standard   Pulse: 69   SpO2: 96%   Weight: 69.6 kg (153 lb 6.4 oz)   Height: 5' 2" (1.575 m)       Physical Exam:  General appearance: alert, cooperative, no distress  HEENT: normocephalic, anicteric, no eye erythema or discharge, no oropharyngeal thrush  Neck: supple, trachea midline, no adenopathy  Lungs: CTA b/l, no rales, rhonchi, or wheezing, unlabored respirations  Heart: RRR, no murmur, rubs, or gallops  Abdomen: soft, non-tender, non-distended, normal bowel sounds, no masses or organomegaly  Rectal: deferred  Extremities: no cyanosis, clubbing, or edema  Musculoskeletal: normal gait  Skin: color and texture normal, no jaundice, no rashes or lesions  Psychiatric: alert and oriented, normal affect and behavior

## 2023-09-06 NOTE — PROGRESS NOTES
Assessment and Plan    #1. Gastroparesis: well controlled with diet, small frequent meals. Does get bloating at times but no nausea or vomiting.   -continue with diet modification to control gastroparesis  -continue digestive enzymes  -call with any worsening symptoms    #2. GERD: relatively well controlled on Pepcid 20 mg BID  -continue pepcid BID and tums as needed  -anti-reflux diet and measures  -call with any worsening of symptoms are could do a short course PPI    #3. Constipation: takes miralax as needed  -continue miralax as needed  -had significant bloating with fiber supplement so stopped this    #4. Colon cancer screening  -repeat colonoscopy in 2030.    1 year follow up, sooner if needed    --------------------------------------------------------------------------------------------------------------------    Chief Complaint: f/u gastroparesis, GERD    HPI: Carlos Reynoso is a 70 y.o. female with a history of gastroparesis, GERD, hypothyroidism, hyperlipidemia, constipation, recent bladder and uterine prolapse with pessary in place who presents today for follow up. She was last seen approximately 1 year ago. Her symptoms been relatively stable. She suffers from gastroparesis but controls this well with her diet and small frequent meals. Denies any nausea or vomiting. Does get some bloating at times. She is taking digestive enzymes which she thinks helps. She reports that her constipation is relatively well controlled with MiraLAX as needed. Denies any blood in the stool or black tarry stools. Her acid reflux is well controlled on Pepcid 20 mg daily and Tums as needed. She was able to wean off her PPI. Denies any significant dysphagia symptoms. Last colonoscopy was in 2020 and was unremarkable. Recommended for repeat in 10 years.       Review of Systems:   General: negative for fatigue, fever, night sweats or unexpected weight loss  Psychological: negative for anxiety or depression  Ophthalmic: negative for blurry vision or scleral icterus  ENT: negative for headaches, oral lesions, sore throat, vocal changes or dysphagia  Hematological and Lymphatic: negative for pallor or swollen lymph nodes  Respiratory: negative for cough, shortness of breath or wheezing  Cardiovascular: negative for chest pain, edema or murmur  Gastrointestinal: as mentioned in HPI  Genito-Urinary: negative for dysuria or incontinence  Musculoskeletal: negative for joint pain, joint stiffness or joint swelling  Dermatological: negative for pruritus, rash, or jaundice    Current Medications  Current Outpatient Medications   Medication Sig Dispense Refill   • Alpha-D-Galactosidase (BEANO PO) Take by mouth     • amLODIPine (NORVASC) 5 mg tablet Take 1 tablet (5 mg total) by mouth daily 30 tablet 0   • DIGESTIVE ENZYMES PO Take by mouth     • famotidine (PEPCID) 20 mg tablet      • levothyroxine 75 mcg tablet Take 75 mcg by mouth daily     • Magnesium Gluconate (MAGNESIUM 27 PO) Take by mouth     • Multiple Vitamin (MULTIVITAMIN ADULT PO) Take by mouth     • VITAMIN D PO Take by mouth     • pravastatin (PRAVACHOL) 20 mg tablet TAKE 1 TABLET BY MOUTH EVERY DAY AT NIGHT (Patient not taking: Reported on 9/6/2023)       No current facility-administered medications for this visit.        Past Medical History  Past Medical History:   Diagnosis Date   • Colon polyp    • Disease of thyroid gland    • GERD (gastroesophageal reflux disease)    • Hypertension    • Vertigo        Past Surgical History  Past Surgical History:   Procedure Laterality Date   • COLONOSCOPY     • GALLBLADDER SURGERY     • OOPHORECTOMY     • UPPER GASTROINTESTINAL ENDOSCOPY         Past Social History   Social History     Socioeconomic History   • Marital status: /Civil Union     Spouse name: None   • Number of children: None   • Years of education: None   • Highest education level: None   Occupational History   • None   Tobacco Use   • Smoking status: Never   • Smokeless tobacco: Never   Vaping Use   • Vaping Use: Never used   Substance and Sexual Activity   • Alcohol use: Not Currently   • Drug use: Never   • Sexual activity: None   Other Topics Concern   • None   Social History Narrative   • None     Social Determinants of Health     Financial Resource Strain: Not on file   Food Insecurity: Not on file   Transportation Needs: Not on file   Physical Activity: Not on file   Stress: Not on file   Social Connections: Not on file   Intimate Partner Violence: Not on file   Housing Stability: Not on file       The following portions of the patient's history were reviewed and updated as appropriate: allergies, current medications, past family history, past medical history, past social history, past surgical history and problem list.    Vital Signs  Vitals:    09/06/23 1127   BP: 126/80   BP Location: Right arm   Patient Position: Sitting   Cuff Size: Standard   Pulse: 69   SpO2: 96%   Weight: 69.6 kg (153 lb 6.4 oz)   Height: 5' 2" (1.575 m)       Physical Exam:  General appearance: alert, cooperative, no distress  HEENT: normocephalic, anicteric, no eye erythema or discharge, no oropharyngeal thrush  Neck: supple, trachea midline, no adenopathy  Lungs: CTA b/l, no rales, rhonchi, or wheezing, unlabored respirations  Heart: RRR, no murmur, rubs, or gallops  Abdomen: soft, non-tender, non-distended, normal bowel sounds, no masses or organomegaly  Rectal: deferred  Extremities: no cyanosis, clubbing, or edema  Musculoskeletal: normal gait  Skin: color and texture normal, no jaundice, no rashes or lesions  Psychiatric: alert and oriented, normal affect and behavior

## 2024-08-19 ENCOUNTER — HOSPITAL ENCOUNTER (INPATIENT)
Facility: HOSPITAL | Age: 69
LOS: 1 days | Discharge: HOME | DRG: 149 | End: 2024-08-20
Attending: EMERGENCY MEDICINE | Admitting: INTERNAL MEDICINE
Payer: COMMERCIAL

## 2024-08-19 ENCOUNTER — APPOINTMENT (EMERGENCY)
Dept: CT IMAGING | Facility: HOSPITAL | Age: 69
DRG: 149 | End: 2024-08-19
Payer: COMMERCIAL

## 2024-08-19 DIAGNOSIS — R51.9 HEADACHE: ICD-10-CM

## 2024-08-19 DIAGNOSIS — D32.9 MENINGIOMA (HCC): ICD-10-CM

## 2024-08-19 DIAGNOSIS — R42 DIZZINESS: Primary | ICD-10-CM

## 2024-08-19 LAB
2HR DELTA HS TROPONIN: 1 NG/L
ALBUMIN SERPL BCG-MCNC: 4.4 G/DL (ref 3.5–5)
ALP SERPL-CCNC: 36 U/L (ref 34–104)
ALT SERPL W P-5'-P-CCNC: 10 U/L (ref 7–52)
ANION GAP SERPL CALCULATED.3IONS-SCNC: 8 MMOL/L (ref 4–13)
AST SERPL W P-5'-P-CCNC: 24 U/L (ref 13–39)
ATRIAL RATE: 72 BPM
BASOPHILS # BLD AUTO: 0.04 THOUSANDS/ÂΜL (ref 0–0.1)
BASOPHILS NFR BLD AUTO: 1 % (ref 0–1)
BILIRUB SERPL-MCNC: 1.17 MG/DL (ref 0.2–1)
BUN SERPL-MCNC: 13 MG/DL (ref 5–25)
CALCIUM SERPL-MCNC: 9.3 MG/DL (ref 8.4–10.2)
CARDIAC TROPONIN I PNL SERPL HS: 3 NG/L
CARDIAC TROPONIN I PNL SERPL HS: 4 NG/L
CHLORIDE SERPL-SCNC: 104 MMOL/L (ref 96–108)
CO2 SERPL-SCNC: 23 MMOL/L (ref 21–32)
CREAT SERPL-MCNC: 0.78 MG/DL (ref 0.6–1.3)
EOSINOPHIL # BLD AUTO: 0.04 THOUSAND/ÂΜL (ref 0–0.61)
EOSINOPHIL NFR BLD AUTO: 1 % (ref 0–6)
ERYTHROCYTE [DISTWIDTH] IN BLOOD BY AUTOMATED COUNT: 13 % (ref 11.6–15.1)
GFR SERPL CREATININE-BSD FRML MDRD: 77 ML/MIN/1.73SQ M
GLUCOSE SERPL-MCNC: 100 MG/DL (ref 65–140)
HCT VFR BLD AUTO: 46.2 % (ref 34.8–46.1)
HGB BLD-MCNC: 15.2 G/DL (ref 11.5–15.4)
IMM GRANULOCYTES # BLD AUTO: 0.03 THOUSAND/UL (ref 0–0.2)
IMM GRANULOCYTES NFR BLD AUTO: 0 % (ref 0–2)
LYMPHOCYTES # BLD AUTO: 1.92 THOUSANDS/ÂΜL (ref 0.6–4.47)
LYMPHOCYTES NFR BLD AUTO: 24 % (ref 14–44)
MCH RBC QN AUTO: 31.4 PG (ref 26.8–34.3)
MCHC RBC AUTO-ENTMCNC: 32.9 G/DL (ref 31.4–37.4)
MCV RBC AUTO: 96 FL (ref 82–98)
MONOCYTES # BLD AUTO: 0.45 THOUSAND/ÂΜL (ref 0.17–1.22)
MONOCYTES NFR BLD AUTO: 6 % (ref 4–12)
NEUTROPHILS # BLD AUTO: 5.45 THOUSANDS/ÂΜL (ref 1.85–7.62)
NEUTS SEG NFR BLD AUTO: 68 % (ref 43–75)
NRBC BLD AUTO-RTO: 0 /100 WBCS
P AXIS: 58 DEGREES
PLATELET # BLD AUTO: 225 THOUSANDS/UL (ref 149–390)
PMV BLD AUTO: 9.4 FL (ref 8.9–12.7)
POTASSIUM SERPL-SCNC: 5.5 MMOL/L (ref 3.5–5.3)
PR INTERVAL: 172 MS
PROT SERPL-MCNC: 7.5 G/DL (ref 6.4–8.4)
QRS AXIS: -2 DEGREES
QRSD INTERVAL: 78 MS
QT INTERVAL: 418 MS
QTC INTERVAL: 457 MS
RBC # BLD AUTO: 4.84 MILLION/UL (ref 3.81–5.12)
SODIUM SERPL-SCNC: 135 MMOL/L (ref 135–147)
T WAVE AXIS: 51 DEGREES
VENTRICULAR RATE: 72 BPM
WBC # BLD AUTO: 7.93 THOUSAND/UL (ref 4.31–10.16)

## 2024-08-19 PROCEDURE — 93010 ELECTROCARDIOGRAM REPORT: CPT | Performed by: INTERNAL MEDICINE

## 2024-08-19 PROCEDURE — 96375 TX/PRO/DX INJ NEW DRUG ADDON: CPT

## 2024-08-19 PROCEDURE — 99223 1ST HOSP IP/OBS HIGH 75: CPT | Performed by: HOSPITALIST

## 2024-08-19 PROCEDURE — 96366 THER/PROPH/DIAG IV INF ADDON: CPT

## 2024-08-19 PROCEDURE — 80053 COMPREHEN METABOLIC PANEL: CPT | Performed by: EMERGENCY MEDICINE

## 2024-08-19 PROCEDURE — 85025 COMPLETE CBC W/AUTO DIFF WBC: CPT | Performed by: EMERGENCY MEDICINE

## 2024-08-19 PROCEDURE — 99285 EMERGENCY DEPT VISIT HI MDM: CPT | Performed by: EMERGENCY MEDICINE

## 2024-08-19 PROCEDURE — 93005 ELECTROCARDIOGRAM TRACING: CPT

## 2024-08-19 PROCEDURE — 70450 CT HEAD/BRAIN W/O DYE: CPT

## 2024-08-19 PROCEDURE — 36415 COLL VENOUS BLD VENIPUNCTURE: CPT

## 2024-08-19 PROCEDURE — 99285 EMERGENCY DEPT VISIT HI MDM: CPT

## 2024-08-19 PROCEDURE — 84484 ASSAY OF TROPONIN QUANT: CPT | Performed by: EMERGENCY MEDICINE

## 2024-08-19 PROCEDURE — 96365 THER/PROPH/DIAG IV INF INIT: CPT

## 2024-08-19 RX ORDER — ENOXAPARIN SODIUM 100 MG/ML
40 INJECTION SUBCUTANEOUS DAILY
Status: DISCONTINUED | OUTPATIENT
Start: 2024-08-20 | End: 2024-08-20 | Stop reason: HOSPADM

## 2024-08-19 RX ORDER — ASPIRIN 325 MG
325 TABLET ORAL ONCE
Status: COMPLETED | OUTPATIENT
Start: 2024-08-19 | End: 2024-08-19

## 2024-08-19 RX ORDER — LEVOTHYROXINE SODIUM 75 UG/1
75 TABLET ORAL
Status: DISCONTINUED | OUTPATIENT
Start: 2024-08-20 | End: 2024-08-20 | Stop reason: HOSPADM

## 2024-08-19 RX ORDER — ROSUVASTATIN CALCIUM 5 MG/1
5 TABLET, COATED ORAL DAILY
COMMUNITY

## 2024-08-19 RX ORDER — ACETAMINOPHEN 325 MG/1
975 TABLET ORAL ONCE
Status: COMPLETED | OUTPATIENT
Start: 2024-08-19 | End: 2024-08-19

## 2024-08-19 RX ORDER — DIAZEPAM 10 MG/2ML
2.5 INJECTION, SOLUTION INTRAMUSCULAR; INTRAVENOUS ONCE
Status: COMPLETED | OUTPATIENT
Start: 2024-08-19 | End: 2024-08-19

## 2024-08-19 RX ORDER — MECLIZINE HCL 12.5 MG 12.5 MG/1
25 TABLET ORAL ONCE
Status: COMPLETED | OUTPATIENT
Start: 2024-08-19 | End: 2024-08-19

## 2024-08-19 RX ORDER — DIPHENHYDRAMINE HYDROCHLORIDE 50 MG/ML
25 INJECTION INTRAMUSCULAR; INTRAVENOUS ONCE
Status: COMPLETED | OUTPATIENT
Start: 2024-08-19 | End: 2024-08-19

## 2024-08-19 RX ORDER — MECLIZINE HYDROCHLORIDE 25 MG/1
25 TABLET ORAL EVERY 8 HOURS PRN
Status: DISCONTINUED | OUTPATIENT
Start: 2024-08-19 | End: 2024-08-20

## 2024-08-19 RX ORDER — PRAVASTATIN SODIUM 40 MG
40 TABLET ORAL
Status: DISCONTINUED | OUTPATIENT
Start: 2024-08-20 | End: 2024-08-20 | Stop reason: HOSPADM

## 2024-08-19 RX ORDER — METOCLOPRAMIDE HYDROCHLORIDE 5 MG/ML
10 INJECTION INTRAMUSCULAR; INTRAVENOUS ONCE
Status: COMPLETED | OUTPATIENT
Start: 2024-08-19 | End: 2024-08-19

## 2024-08-19 RX ORDER — ASPIRIN 81 MG/1
81 TABLET, CHEWABLE ORAL DAILY
Status: DISCONTINUED | OUTPATIENT
Start: 2024-08-20 | End: 2024-08-20 | Stop reason: HOSPADM

## 2024-08-19 RX ORDER — MAGNESIUM SULFATE HEPTAHYDRATE 40 MG/ML
2 INJECTION, SOLUTION INTRAVENOUS ONCE
Status: COMPLETED | OUTPATIENT
Start: 2024-08-19 | End: 2024-08-19

## 2024-08-19 RX ORDER — KETOROLAC TROMETHAMINE 30 MG/ML
15 INJECTION, SOLUTION INTRAMUSCULAR; INTRAVENOUS ONCE
Status: COMPLETED | OUTPATIENT
Start: 2024-08-19 | End: 2024-08-19

## 2024-08-19 RX ORDER — FAMOTIDINE 20 MG/1
20 TABLET, FILM COATED ORAL DAILY
Status: DISCONTINUED | OUTPATIENT
Start: 2024-08-20 | End: 2024-08-20 | Stop reason: HOSPADM

## 2024-08-19 RX ADMIN — DIAZEPAM 2.5 MG: 10 INJECTION, SOLUTION INTRAMUSCULAR; INTRAVENOUS at 16:19

## 2024-08-19 RX ADMIN — SODIUM CHLORIDE 500 ML: 0.9 INJECTION, SOLUTION INTRAVENOUS at 14:01

## 2024-08-19 RX ADMIN — KETOROLAC TROMETHAMINE 15 MG: 30 INJECTION, SOLUTION INTRAMUSCULAR; INTRAVENOUS at 16:19

## 2024-08-19 RX ADMIN — DIPHENHYDRAMINE HYDROCHLORIDE 25 MG: 50 INJECTION, SOLUTION INTRAMUSCULAR; INTRAVENOUS at 14:02

## 2024-08-19 RX ADMIN — ACETAMINOPHEN 975 MG: 325 TABLET, FILM COATED ORAL at 16:16

## 2024-08-19 RX ADMIN — MECLIZINE HYDROCHLORIDE 25 MG: 12.5 TABLET ORAL at 16:16

## 2024-08-19 RX ADMIN — ASPIRIN 325 MG ORAL TABLET 325 MG: 325 PILL ORAL at 20:14

## 2024-08-19 RX ADMIN — METOCLOPRAMIDE 10 MG: 5 INJECTION, SOLUTION INTRAMUSCULAR; INTRAVENOUS at 14:02

## 2024-08-19 RX ADMIN — MAGNESIUM SULFATE HEPTAHYDRATE 2 G: 40 INJECTION, SOLUTION INTRAVENOUS at 14:06

## 2024-08-19 NOTE — ED PROVIDER NOTES
History  Chief Complaint   Patient presents with    Dizziness     Dizzy and weak since Friday. Hx vertigo     68 yo F coming to the ED for evaluation of persistent dizziness, described as feeling off balance, with associated generalized headache that started 5 days ago.  She says it was not sudden onset or worst headache of life.  She does not typically get migraines.  She states that she has had vertigo but this feels different.  She has not been able to walk independently since Saturday, over 48 hours ago.  She states this sensation is not so much lightheadedness or spinning but she feels more off balance.  She has vomited.  It is worse with position change.  She denies neck pain, no fevers or chills.      History provided by:  Patient   used: No    Dizziness  Associated symptoms: headaches        Prior to Admission Medications   Prescriptions Last Dose Informant Patient Reported? Taking?   DIGESTIVE ENZYMES PO 8/18/2024 Self Yes Yes   Sig: Take by mouth   VITAMIN D PO Past Week Self Yes Yes   Sig: Take by mouth   amLODIPine (NORVASC) 5 mg tablet 8/18/2024 Self No Yes   Sig: Take 1 tablet (5 mg total) by mouth daily   famotidine (PEPCID) 20 mg tablet 8/18/2024  Yes Yes   levothyroxine 75 mcg tablet 8/19/2024 Self Yes Yes   Sig: Take 75 mcg by mouth daily   rosuvastatin (CRESTOR) 5 mg tablet Past Week  Yes Yes   Sig: Take 5 mg by mouth daily      Facility-Administered Medications: None       Past Medical History:   Diagnosis Date    Colon polyp     Disease of thyroid gland     GERD (gastroesophageal reflux disease)     Hypertension     Vertigo        Past Surgical History:   Procedure Laterality Date    COLONOSCOPY      GALLBLADDER SURGERY      OOPHORECTOMY      UPPER GASTROINTESTINAL ENDOSCOPY         Family History   Problem Relation Age of Onset    Prostate cancer Father     Breast cancer Paternal Grandmother     Breast cancer Paternal Aunt     Breast cancer Cousin     Lymphoma Cousin      Breast cancer Cousin     Cancer Cousin     Stomach cancer Cousin     Squamous cell carcinoma Daughter      I have reviewed and agree with the history as documented.    E-Cigarette/Vaping    E-Cigarette Use Never User      E-Cigarette/Vaping Substances     Social History     Tobacco Use    Smoking status: Never    Smokeless tobacco: Never   Vaping Use    Vaping status: Never Used   Substance Use Topics    Alcohol use: Not Currently    Drug use: Never       Review of Systems   Neurological:  Positive for dizziness and headaches.   All other systems reviewed and are negative.      Physical Exam  Physical Exam  Vitals and nursing note reviewed.   Constitutional:       General: She is not in acute distress.     Appearance: Normal appearance. She is well-developed and normal weight. She is ill-appearing. She is not toxic-appearing or diaphoretic.   HENT:      Head: Normocephalic and atraumatic.      Right Ear: External ear normal.      Left Ear: External ear normal.      Nose: Nose normal.      Mouth/Throat:      Mouth: Mucous membranes are moist.      Pharynx: Oropharynx is clear.   Eyes:      General: No scleral icterus.     Extraocular Movements: Extraocular movements intact.      Conjunctiva/sclera: Conjunctivae normal.      Pupils: Pupils are equal, round, and reactive to light.   Cardiovascular:      Rate and Rhythm: Normal rate and regular rhythm.      Pulses: Normal pulses.      Heart sounds: Normal heart sounds.   Pulmonary:      Effort: Pulmonary effort is normal.      Breath sounds: Normal breath sounds.   Abdominal:      General: Abdomen is flat. There is no distension.      Palpations: Abdomen is soft.      Tenderness: There is no abdominal tenderness.   Musculoskeletal:         General: Normal range of motion.      Cervical back: Normal range of motion.   Skin:     General: Skin is warm and dry.      Capillary Refill: Capillary refill takes less than 2 seconds.      Coloration: Skin is not jaundiced.    Neurological:      General: No focal deficit present.      Mental Status: She is alert and oriented to person, place, and time. Mental status is at baseline.      Cranial Nerves: No cranial nerve deficit.      Sensory: No sensory deficit.      Motor: No weakness.      Coordination: Coordination normal.      Comments: Needs to walk with assistance, very slowly gait. + Rhomberg sign.  Normal finger to nose, heel to shin, and rapid alternating movements.  Vision grossly intact.  No nystagmus. Negative rl-hallpike maneuver bilaterally.   Psychiatric:         Mood and Affect: Mood normal.         Behavior: Behavior normal.         Vital Signs  ED Triage Vitals   Temperature Pulse Respirations Blood Pressure SpO2   08/19/24 1207 08/19/24 1207 08/19/24 1207 08/19/24 1207 08/19/24 1207   97.8 °F (36.6 °C) 69 18 (!) 190/90 98 %      Temp Source Heart Rate Source Patient Position - Orthostatic VS BP Location FiO2 (%)   08/19/24 2000 08/19/24 1900 08/19/24 1207 08/19/24 1207 --   Oral Monitor Sitting Right arm       Pain Score       08/19/24 1616       5           Vitals:    08/20/24 0100 08/20/24 0500 08/20/24 0805 08/20/24 1447   BP: 132/76 128/69 140/79 131/85   Pulse: 58 56 75 81   Patient Position - Orthostatic VS: Lying Lying           Visual Acuity  Visual Acuity      Flowsheet Row Most Recent Value   L Pupil Size (mm) 2   R Pupil Size (mm) 2   L Pupil Shape Round   R Pupil Shape Round            ED Medications  Medications   pravastatin (PRAVACHOL) tablet 40 mg (has no administration in time range)   levothyroxine tablet 75 mcg (75 mcg Oral Given 8/20/24 0532)   famotidine (PEPCID) tablet 20 mg (20 mg Oral Given 8/20/24 0916)   enoxaparin (LOVENOX) subcutaneous injection 40 mg (40 mg Subcutaneous Given 8/20/24 0916)   aspirin chewable tablet 81 mg (81 mg Oral Given 8/20/24 0916)   meclizine (ANTIVERT) tablet 25 mg (25 mg Oral Given 8/20/24 1359)   metoclopramide (REGLAN) injection 10 mg (10 mg Intravenous Given  8/19/24 1402)   diphenhydrAMINE (BENADRYL) injection 25 mg (25 mg Intravenous Given 8/19/24 1402)   magnesium sulfate 2 g/50 mL IVPB (premix) 2 g (0 g Intravenous Stopped 8/19/24 1615)   sodium chloride 0.9 % bolus 500 mL (0 mL Intravenous Stopped 8/19/24 1440)   ketorolac (TORADOL) injection 15 mg (15 mg Intravenous Given 8/19/24 1619)   acetaminophen (TYLENOL) tablet 975 mg (975 mg Oral Given 8/19/24 1616)   meclizine (ANTIVERT) tablet 25 mg (25 mg Oral Given 8/19/24 1616)   diazepam (VALIUM) injection 2.5 mg (2.5 mg Intravenous Given 8/19/24 1619)   aspirin tablet 325 mg (325 mg Oral Given 8/19/24 2014)   LORazepam (ATIVAN) injection 1 mg (1 mg Intravenous Given 8/20/24 0823)       Diagnostic Studies  Results Reviewed       Procedure Component Value Units Date/Time    HS Troponin I 2hr [[id number]]  (Normal) Collected: 08/19/24 1440    Lab Status: Final result Specimen: Blood from Arm, Left Updated: 08/19/24 1510     hs TnI 2hr 4 ng/L      Delta 2hr hsTnI 1 ng/L     HS Troponin 0hr (reflex protocol) [[id number]]  (Normal) Collected: 08/19/24 1221    Lab Status: Final result Specimen: Blood from Arm, Left Updated: 08/19/24 1256     hs TnI 0hr 3 ng/L     Comprehensive metabolic panel [[id number]]  (Abnormal) Collected: 08/19/24 1221    Lab Status: Final result Specimen: Blood from Arm, Left Updated: 08/19/24 1250     Sodium 135 mmol/L      Potassium 5.5 mmol/L      Chloride 104 mmol/L      CO2 23 mmol/L      ANION GAP 8 mmol/L      BUN 13 mg/dL      Creatinine 0.78 mg/dL      Glucose 100 mg/dL      Calcium 9.3 mg/dL      AST 24 U/L      ALT 10 U/L      Alkaline Phosphatase 36 U/L      Total Protein 7.5 g/dL      Albumin 4.4 g/dL      Total Bilirubin 1.17 mg/dL      eGFR 77 ml/min/1.73sq m     Narrative:      National Kid[employer] Disease Foundation guidelines for Chronic Kidney Disease (CKD):     Stage 1 with normal or high GFR (GFR > 90 mL/min/1.73 square meters)    Stage 2 Mild CKD (GFR = 60-89 mL/min/1.73 square  meters)    Stage 3A Moderate CKD (GFR = 45-59 mL/min/1.73 square meters)    Stage 3B Moderate CKD (GFR = 30-44 mL/min/1.73 square meters)    Stage 4 Severe CKD (GFR = 15-29 mL/min/1.73 square meters)    Stage 5 End Stage CKD (GFR <15 mL/min/1.73 square meters)  Note: GFR calculation is accurate only with a steady state creatinine    CBC and differential [[id number]]  (Abnormal) Collected: 08/19/24 1221    Lab Status: Final result Specimen: Blood from Arm, Left Updated: 08/19/24 1235     WBC 7.93 Thousand/uL      RBC 4.84 Million/uL      Hemoglobin 15.2 g/dL      Hematocrit 46.2 %      MCV 96 fL      MCH 31.4 pg      MCHC 32.9 g/dL      RDW 13.0 %      MPV 9.4 fL      Platelets 225 Thousands/uL      nRBC 0 /100 WBCs      Segmented % 68 %      Immature Grans % 0 %      Lymphocytes % 24 %      Monocytes % 6 %      Eosinophils Relative 1 %      Basophils Relative 1 %      Absolute Neutrophils 5.45 Thousands/µL      Absolute Immature Grans 0.03 Thousand/uL      Absolute Lymphocytes 1.92 Thousands/µL      Absolute Monocytes 0.45 Thousand/µL      Eosinophils Absolute 0.04 Thousand/µL      Basophils Absolute 0.04 Thousands/µL                    MRI brain wo contrast   Final Result by Taras Ferrer MD (08/20 0910)      No mass effect, acute intracranial hemorrhage or evidence of recent infarction.      Incidentally noted 8 mm nodular extra-axial lesion along the right parietal convexity may represent a meningioma in the absence of any known primary malignancy. Postcontrast imaging recommended for further evaluation.      Workstation performed: [id number]         CT head without contrast   Final Result by Giovani Srivastava MD (08/19 8556)      No acute intracranial abnormality.                  Workstation performed: [id number]                    Procedures  ECG 12 Lead Documentation Only    Date/Time: 8/19/2024 5:16 PM    Performed by: Tono Mosher DO  Authorized by: Tono Mosher DO    Indications / Diagnosis:   Dizziness  ECG reviewed by me, the ED Provider: yes    Patient location:  ED  Previous ECG:     Previous ECG:  Compared to current    Similarity:  Changes noted    Comparison to cardiac monitor: Yes    Interpretation:     Interpretation: normal    Rate:     ECG rate:  72    ECG rate assessment: normal    Rhythm:     Rhythm: sinus rhythm    Ectopy:     Ectopy: none    QRS:     QRS axis:  Normal    QRS intervals:  Normal  Conduction:     Conduction: normal    ST segments:     ST segments:  Normal  T waves:     T waves: normal             ED Course  ED Course as of 08/20/24 1504   Mon Aug 19, 2024   1616 Potassium(!): 5.5  Moderately hemolyzed, likely normal, will not treat.   1616 Patient states feeling slightly better, headache and dizziness down to a 6/10. She was able to stand and walk slowly with my assistance.  CT head negative. Labs otherwise WNL.  Will add more meds to see if we can resolve her symptoms.     symptoms persistent despite meds.  Will admit for intractable dizziness and headache, r/o posterior circulation stroke or other neurologic disorder.                                          Medical Decision Making  Amount and/or Complexity of Data Reviewed  Labs: ordered. Decision-making details documented in ED Course.  Radiology: ordered.    Risk  OTC drugs.  Prescription drug management.  Decision regarding hospitalization.                 Disposition  Final diagnoses:   Dizziness   Headache     Time reflects when diagnosis was documented in both MDM as applicable and the Disposition within this note       Time User Action Codes Description Comment    8/19/2024  5:17 PM Toon Mosher Add [R42] Dizziness     8/19/2024  5:17 PM Tono Mosher Add [R51.9] Headache     8/20/2024  2:33 PM Lynsey Salazar Add [D32.9] Meningioma (HCC)           ED Disposition       ED Disposition   Admit    Condition   Stable    Date/Time   Mon Aug 19, 2024  5:45 PM    Comment   Case was discussed with JOSE ALBERTO and the patient's  admission status was agreed to be Admission Status: observation status to the service of Dr. Anand .               Follow-up Information       Follow up With Specialties Details Why Contact Info    Vera Mark DO Internal Medicine Follow up  [address]  [address]  [facility] [address]  [phone]      Oliver Moraes MD Neurosurgery Follow up  [address]  [address]  [facility] [address]  [phone]      Magnus Eastman MD Otolaryngology Follow up  [address]  [address]  [facility] [address]  [phone]              Current Discharge Medication List        START taking these medications    Details   meclizine (ANTIVERT) 25 mg tablet Take 1 tablet (25 mg total) by mouth every 8 (eight) hours Until resolution of symptoms  Qty: 30 tablet, Refills: 0    Associated Diagnoses: Dizziness           CONTINUE these medications which have NOT CHANGED    Details   amLODIPine (NORVASC) 5 mg tablet Take 1 tablet (5 mg total) by mouth daily  Qty: 30 tablet, Refills: 0    Associated Diagnoses: Hypertensive urgency      DIGESTIVE ENZYMES PO Take by mouth      famotidine (PEPCID) 20 mg tablet       levothyroxine 75 mcg tablet Take 75 mcg by mouth daily      rosuvastatin (CRESTOR) 5 mg tablet Take 5 mg by mouth daily      VITAMIN D PO Take by mouth             Outpatient Discharge Orders   Ambulatory referral to Physical Therapy   Standing Status: Future Standing Exp. Date: 08/20/25      Ambulatory Referral to Otolaryngology   Standing Status: Future Standing Exp. Date: 08/20/25      Ambulatory Referral to Neurosurgery   Standing Status: Future Standing Exp. Date: 08/20/25      Discharge Diet     No driving until       PDMP Review       None            ED Provider  Electronically Signed by             Tono Mosher DO  08/20/24 2339

## 2024-08-19 NOTE — H&P
[facility]  H&P  Name: Rosette Irwin 69 y.o. female I MRN: [id number]  Unit/Bed#: ED-41 I Date of Admission: 8/19/2024   Date of Service: 8/19/2024 I Hospital Day: 0      Assessment & Plan   Gastroparesis  Assessment & Plan  Chronic, well-controlled.      Dizziness  Assessment & Plan  Patient has horizontal nystagmus.  Hints exam performed. (Head impulse testing was abnormal), there was no vertical skew.  And nystagmus was horizontal.  This likely represents a peripheral process this coupled with the fact that she has tinnitus and abnormal hearing on the right ear.  However we will rule out a central process with MRI brain to rule out posterior circulation CVA.   Continue meclizine  Continue Zofran as needed.   Will allow for permissive hypertension.   Will give aspirin load.   Statin.     Mixed hyperlipidemia  Assessment & Plan  Continue Crestor 5 mg daily    Hypothyroidism  Assessment & Plan  Continue levothyroxine           VTE Pharmacologic Prophylaxis: VTE Score: 3 Moderate Risk (Score 3-4) - Pharmacological DVT Prophylaxis Ordered: enoxaparin (Lovenox).  Code Status: Prior full code  Discussion with family: Updated  (daughter) at bedside.    Anticipated Length of Stay: Patient will be admitted on an inpatient basis with an anticipated length of stay of greater than 2 midnights secondary to stroke workup.    Total Time Spent on Date of Encounter in care of patient: 65 mins. This time was spent on one or more of the following: performing physical exam; counseling and coordination of care; obtaining or reviewing history; documenting in the medical record; reviewing/ordering tests, medications or procedures; communicating with other healthcare professionals and discussing with patient's family/caregivers.    Chief Complaint: Dizziness    History of Present Illness:  Rosette Irwin is a 69 y.o. female with a PMH of gastroparesis, hypothyroidism, hypertension, hyperlipidemia  who presents with 3-day history of dizziness.  Per patient 5 days ago she noted this severe headache which resolved on its own as the first time she experienced.  That this past Saturday she was out shopping suddenly felt a feeling of dizziness.  She denies vertigo, nausea or vomiting however reports that she wanted to lay down to feel better.  While lying down she continued to experience symptoms of dizziness and essentially has been in bed since then she only gets up to go to the bathroom.  On arrival to ER she received meclizine, Valium, and headache cocktail with improvement in symptoms.  She denies the persistence of headaches at this time.  No history of migraines.  She does have a history of vertigo several years ago which was treated with vestibular therapy.  No chest pain or palpitations.  She does report tinnitus which is persistent in both ears, she also reports a whooshing sound that has been present since onset of symptoms as well.    Review of Systems:  Review of Systems   Constitutional:  Positive for activity change. Negative for fever.   HENT:  Positive for hearing loss and tinnitus. Negative for congestion, ear discharge and ear pain.    Eyes: Negative.    Respiratory: Negative.     Cardiovascular: Negative.    Gastrointestinal: Negative.    Endocrine: Negative.    Genitourinary: Negative.    Musculoskeletal: Negative.    Allergic/Immunologic: Negative.    Neurological:  Positive for dizziness and headaches. Negative for weakness.   Hematological: Negative.    Psychiatric/Behavioral: Negative.         Past Medical and Surgical History:   Past Medical History:   Diagnosis Date    Colon polyp     Disease of thyroid gland     GERD (gastroesophageal reflux disease)     Hypertension     Vertigo        Past Surgical History:   Procedure Laterality Date    COLONOSCOPY      GALLBLADDER SURGERY      OOPHORECTOMY      UPPER GASTROINTESTINAL ENDOSCOPY         Meds/Allergies:  Prior to Admission medications     Medication Sig Start Date End Date Taking? Authorizing Provider   amLODIPine (NORVASC) 5 mg tablet Take 1 tablet (5 mg total) by mouth daily 7/6/20  Yes Magnus Yoder, DO   DIGESTIVE ENZYMES PO Take by mouth   Yes Historical Provider, MD   famotidine (PEPCID) 20 mg tablet  8/29/23  Yes Historical Provider, MD   levothyroxine 75 mcg tablet Take 75 mcg by mouth daily 11/15/19  Yes Historical Provider, MD   rosuvastatin (CRESTOR) 5 mg tablet Take 5 mg by mouth daily   Yes Historical Provider, MD   VITAMIN D PO Take by mouth    Historical Provider, MD   Alpha-D-Galactosidase (BEANO PO) Take by mouth  Patient not taking: Reported on 8/19/2024 8/19/24  Historical Provider, MD   Magnesium Gluconate (MAGNESIUM 27 PO) Take by mouth  Patient not taking: Reported on 8/19/2024 8/19/24  Historical Provider, MD   Multiple Vitamin (MULTIVITAMIN ADULT PO) Take by mouth  Patient not taking: Reported on 8/19/2024 8/19/24  Historical Provider, MD   pravastatin (PRAVACHOL) 20 mg tablet TAKE 1 TABLET BY MOUTH EVERY DAY AT NIGHT  Patient not taking: Reported on 9/6/2023 6/14/23 8/19/24  Historical Provider, MD     I have reviewed home medications with patient personally.    Allergies:   Allergies   Allergen Reactions    Atorvastatin Myalgia    Rosuvastatin Myalgia    Sulfa Antibiotics Nausea Only    Medical Tape Rash       Social History:  Marital Status: /Civil Union   Occupation: retired  Patient Pre-hospital Living Situation: Home  Patient Pre-hospital Level of Mobility: walks  Patient Pre-hospital Diet Restrictions: none  Substance Use History:   Social History     Substance and Sexual Activity   Alcohol Use Not Currently     Social History     Tobacco Use   Smoking Status Never   Smokeless Tobacco Never     Social History     Substance and Sexual Activity   Drug Use Never       Family History:  Family History   Problem Relation Age of Onset    Prostate cancer Father     Breast cancer Paternal Grandmother     Breast  "cancer Paternal Aunt     Breast cancer Cousin     Lymphoma Cousin     Breast cancer Cousin     Cancer Cousin     Stomach cancer Cousin     Squamous cell carcinoma Daughter        Physical Exam:     Vitals:   Blood Pressure: 122/64 (08/19/24 1700)  Pulse: 66 (08/19/24 1700)  Temperature: 97.8 °F (36.6 °C) (08/19/24 1207)  Respirations: 17 (08/19/24 1700)  Height: 5' 2\" (157.5 cm) (08/19/24 1207)  Weight - Scale: 72.1 kg (158 lb 15.2 oz) (08/19/24 1207)  SpO2: 96 % (08/19/24 1700)    Physical Exam  Vitals and nursing note reviewed.   Constitutional:       General: She is not in acute distress.     Appearance: Normal appearance. She is not ill-appearing or diaphoretic.   HENT:      Head: Normocephalic and atraumatic.      Right Ear: Tympanic membrane, ear canal and external ear normal. There is no impacted cerumen.      Left Ear: Tympanic membrane, ear canal and external ear normal. There is no impacted cerumen.      Nose: Nose normal. No congestion.      Mouth/Throat:      Mouth: Mucous membranes are moist.   Eyes:      Extraocular Movements: Extraocular movements intact.      Conjunctiva/sclera: Conjunctivae normal.      Pupils: Pupils are equal, round, and reactive to light.      Comments: Right beating nystagmus present in both eyes   Cardiovascular:      Rate and Rhythm: Normal rate and regular rhythm.      Pulses: Normal pulses.   Pulmonary:      Effort: Pulmonary effort is normal.   Abdominal:      General: Abdomen is flat. Bowel sounds are normal.      Palpations: Abdomen is soft.   Musculoskeletal:         General: Normal range of motion.      Cervical back: Normal range of motion and neck supple. No rigidity.   Skin:     General: Skin is warm and dry.   Neurological:      General: No focal deficit present.      Mental Status: She is alert and oriented to person, place, and time.      Cranial Nerves: No cranial nerve deficit.      Sensory: No sensory deficit.      Motor: No weakness.      Coordination: " Coordination normal.      Comments: Hints exam performed          Additional Data:     Lab Results:  Results from last 7 days   Lab Units 08/19/24  1221   WBC Thousand/uL 7.93   HEMOGLOBIN g/dL 15.2   HEMATOCRIT % 46.2*   PLATELETS Thousands/uL 225   SEGS PCT % 68   LYMPHO PCT % 24   MONO PCT % 6   EOS PCT % 1     Results from last 7 days   Lab Units 08/19/24  1221   SODIUM mmol/L 135   POTASSIUM mmol/L 5.5*   CHLORIDE mmol/L 104   CO2 mmol/L 23   BUN mg/dL 13   CREATININE mg/dL 0.78   ANION GAP mmol/L 8   CALCIUM mg/dL 9.3   ALBUMIN g/dL 4.4   TOTAL BILIRUBIN mg/dL 1.17*   ALK PHOS U/L 36   ALT U/L 10   AST U/L 24   GLUCOSE RANDOM mg/dL 100             Lab Results   Component Value Date    HGBA1C 5.3 12/13/2023    HGBA1C 5.4 12/01/2022    HGBA1C 5.3 07/05/2020           Lines/Drains:  Invasive Devices       Peripheral Intravenous Line  Duration             Peripheral IV 08/19/24 Left;Proximal;Ventral (anterior) Forearm <1 day                        Imaging: Reviewed radiology reports from this admission including: CT head  CT head without contrast   Final Result by Giovani Srivastava MD (08/19 6406)      No acute intracranial abnormality.                  Workstation performed: [id number]             EKG and Other Studies Reviewed on Admission:   EKG: Personally Reviewed. Unchanged from prior.     ** Please Note: This note has been constructed using a voice recognition system. **

## 2024-08-19 NOTE — ASSESSMENT & PLAN NOTE
Patient has horizontal nystagmus.  Hints exam performed. (Head impulse testing was abnormal), there was no vertical skew.  And nystagmus was horizontal.  This likely represents a peripheral process this coupled with the fact that she has tinnitus and abnormal hearing on the right ear.  However we will rule out a central process with MRI brain to rule out posterior circulation CVA.   Continue meclizine  Continue Zofran as needed.   Will allow for permissive hypertension.   Will give aspirin load.   Statin.

## 2024-08-19 NOTE — Clinical Note
Case was discussed with JOSE ALBERTO and the patient's admission status was agreed to be Admission Status: observation status to the service of  __ .

## 2024-08-20 ENCOUNTER — APPOINTMENT (INPATIENT)
Dept: MRI IMAGING | Facility: HOSPITAL | Age: 69
DRG: 149 | End: 2024-08-20
Payer: COMMERCIAL

## 2024-08-20 VITALS
SYSTOLIC BLOOD PRESSURE: 131 MMHG | TEMPERATURE: 97.6 F | DIASTOLIC BLOOD PRESSURE: 85 MMHG | RESPIRATION RATE: 18 BRPM | OXYGEN SATURATION: 93 % | WEIGHT: 158.95 LBS | HEIGHT: 62 IN | HEART RATE: 81 BPM | BODY MASS INDEX: 29.25 KG/M2

## 2024-08-20 PROBLEM — D32.9 MENINGIOMA (HCC): Status: ACTIVE | Noted: 2024-08-20

## 2024-08-20 LAB
ANION GAP SERPL CALCULATED.3IONS-SCNC: 8 MMOL/L (ref 4–13)
BASOPHILS # BLD AUTO: 0.03 THOUSANDS/ÂΜL (ref 0–0.1)
BASOPHILS NFR BLD AUTO: 1 % (ref 0–1)
BUN SERPL-MCNC: 16 MG/DL (ref 5–25)
CALCIUM SERPL-MCNC: 8.9 MG/DL (ref 8.4–10.2)
CHLORIDE SERPL-SCNC: 106 MMOL/L (ref 96–108)
CHOLEST SERPL-MCNC: 245 MG/DL
CO2 SERPL-SCNC: 23 MMOL/L (ref 21–32)
CREAT SERPL-MCNC: 0.74 MG/DL (ref 0.6–1.3)
DME PARACHUTE DELIVERY DATE REQUESTED: NORMAL
DME PARACHUTE ITEM DESCRIPTION: NORMAL
DME PARACHUTE ORDER STATUS: NORMAL
DME PARACHUTE SUPPLIER NAME: NORMAL
DME PARACHUTE SUPPLIER PHONE: NORMAL
EOSINOPHIL # BLD AUTO: 0.09 THOUSAND/ÂΜL (ref 0–0.61)
EOSINOPHIL NFR BLD AUTO: 1 % (ref 0–6)
ERYTHROCYTE [DISTWIDTH] IN BLOOD BY AUTOMATED COUNT: 13 % (ref 11.6–15.1)
EST. AVERAGE GLUCOSE BLD GHB EST-MCNC: 111 MG/DL
GFR SERPL CREATININE-BSD FRML MDRD: 82 ML/MIN/1.73SQ M
GLUCOSE SERPL-MCNC: 75 MG/DL (ref 65–140)
HBA1C MFR BLD: 5.5 %
HCT VFR BLD AUTO: 44.2 % (ref 34.8–46.1)
HDLC SERPL-MCNC: 34 MG/DL
HGB BLD-MCNC: 14.4 G/DL (ref 11.5–15.4)
IMM GRANULOCYTES # BLD AUTO: 0.03 THOUSAND/UL (ref 0–0.2)
IMM GRANULOCYTES NFR BLD AUTO: 1 % (ref 0–2)
LDLC SERPL CALC-MCNC: 177 MG/DL (ref 0–100)
LYMPHOCYTES # BLD AUTO: 2.6 THOUSANDS/ÂΜL (ref 0.6–4.47)
LYMPHOCYTES NFR BLD AUTO: 41 % (ref 14–44)
MCH RBC QN AUTO: 31.2 PG (ref 26.8–34.3)
MCHC RBC AUTO-ENTMCNC: 32.6 G/DL (ref 31.4–37.4)
MCV RBC AUTO: 96 FL (ref 82–98)
MONOCYTES # BLD AUTO: 0.46 THOUSAND/ÂΜL (ref 0.17–1.22)
MONOCYTES NFR BLD AUTO: 7 % (ref 4–12)
NEUTROPHILS # BLD AUTO: 3.14 THOUSANDS/ÂΜL (ref 1.85–7.62)
NEUTS SEG NFR BLD AUTO: 49 % (ref 43–75)
NRBC BLD AUTO-RTO: 0 /100 WBCS
PLATELET # BLD AUTO: 220 THOUSANDS/UL (ref 149–390)
PMV BLD AUTO: 9.2 FL (ref 8.9–12.7)
POTASSIUM SERPL-SCNC: 3.7 MMOL/L (ref 3.5–5.3)
RBC # BLD AUTO: 4.61 MILLION/UL (ref 3.81–5.12)
SODIUM SERPL-SCNC: 137 MMOL/L (ref 135–147)
TRIGL SERPL-MCNC: 168 MG/DL
WBC # BLD AUTO: 6.35 THOUSAND/UL (ref 4.31–10.16)

## 2024-08-20 PROCEDURE — 80061 LIPID PANEL: CPT | Performed by: HOSPITALIST

## 2024-08-20 PROCEDURE — 97163 PT EVAL HIGH COMPLEX 45 MIN: CPT

## 2024-08-20 PROCEDURE — 80048 BASIC METABOLIC PNL TOTAL CA: CPT | Performed by: HOSPITALIST

## 2024-08-20 PROCEDURE — 85025 COMPLETE CBC W/AUTO DIFF WBC: CPT | Performed by: HOSPITALIST

## 2024-08-20 PROCEDURE — 83036 HEMOGLOBIN GLYCOSYLATED A1C: CPT | Performed by: HOSPITALIST

## 2024-08-20 PROCEDURE — 99239 HOSP IP/OBS DSCHRG MGMT >30: CPT | Performed by: PHYSICIAN ASSISTANT

## 2024-08-20 PROCEDURE — 70551 MRI BRAIN STEM W/O DYE: CPT

## 2024-08-20 RX ORDER — LORAZEPAM 2 MG/ML
1 INJECTION INTRAMUSCULAR ONCE
Status: COMPLETED | OUTPATIENT
Start: 2024-08-20 | End: 2024-08-20

## 2024-08-20 RX ORDER — MECLIZINE HYDROCHLORIDE 25 MG/1
25 TABLET ORAL EVERY 8 HOURS SCHEDULED
Status: DISCONTINUED | OUTPATIENT
Start: 2024-08-20 | End: 2024-08-20 | Stop reason: HOSPADM

## 2024-08-20 RX ORDER — MECLIZINE HYDROCHLORIDE 25 MG/1
25 TABLET ORAL EVERY 8 HOURS SCHEDULED
Qty: 30 TABLET | Refills: 0 | Status: SHIPPED | OUTPATIENT
Start: 2024-08-20

## 2024-08-20 RX ADMIN — LEVOTHYROXINE SODIUM 75 MCG: 75 TABLET ORAL at 05:32

## 2024-08-20 RX ADMIN — MECLIZINE HYDROCHLORIDE 25 MG: 25 TABLET ORAL at 01:25

## 2024-08-20 RX ADMIN — FAMOTIDINE 20 MG: 20 TABLET ORAL at 09:16

## 2024-08-20 RX ADMIN — MECLIZINE HYDROCHLORIDE 25 MG: 25 TABLET ORAL at 09:22

## 2024-08-20 RX ADMIN — LORAZEPAM 1 MG: 2 INJECTION INTRAMUSCULAR; INTRAVENOUS at 08:23

## 2024-08-20 RX ADMIN — ENOXAPARIN SODIUM 40 MG: 40 INJECTION SUBCUTANEOUS at 09:16

## 2024-08-20 RX ADMIN — ASPIRIN 81 MG 81 MG: 81 TABLET ORAL at 09:16

## 2024-08-20 RX ADMIN — MECLIZINE HYDROCHLORIDE 25 MG: 25 TABLET ORAL at 13:59

## 2024-08-20 NOTE — PLAN OF CARE
Problem: NEUROSENSORY - ADULT  Goal: Achieves stable or improved neurological status  Description: INTERVENTIONS  - Monitor and report changes in neurological status  - Monitor vital signs such as temperature, blood pressure, glucose, and any other labs ordered   - Initiate measures to prevent increased intracranial pressure  - Monitor for seizure activity and implement precautions if appropriate      Outcome: Progressing  Goal: Remains free of injury related to seizures activity  Description: INTERVENTIONS  - Maintain airway, patient safety  and administer oxygen as ordered  - Monitor patient for seizure activity, document and report duration and description of seizure to physician/advanced practitioner  - If seizure occurs,  ensure patient safety during seizure  - Reorient patient post seizure  - Seizure pads on all 4 side rails  - Instruct patient/family to notify RN of any seizure activity including if an aura is experienced  - Instruct patient/family to call for assistance with activity based on nursing assessment  - Administer anti-seizure medications if ordered    Outcome: Progressing  Goal: Achieves maximal functionality and self care  Description: INTERVENTIONS  - Monitor swallowing and airway patency with patient fatigue and changes in neurological status  - Encourage and assist patient to increase activity and self care.   - Encourage visually impaired, hearing impaired and aphasic patients to use assistive/communication devices  Outcome: Progressing     Problem: CARDIOVASCULAR - ADULT  Goal: Maintains optimal cardiac output and hemodynamic stability  Description: INTERVENTIONS:  - Monitor I/O, vital signs and rhythm  - Monitor for S/S and trends of decreased cardiac output  - Administer and titrate ordered vasoactive medications to optimize hemodynamic stability  - Assess quality of pulses, skin color and temperature  - Assess for signs of decreased coronary artery perfusion  - Instruct patient to  report change in severity of symptoms  Outcome: Progressing  Goal: Absence of cardiac dysrhythmias or at baseline rhythm  Description: INTERVENTIONS:  - Continuous cardiac monitoring, vital signs, obtain 12 lead EKG if ordered  - Administer antiarrhythmic and heart rate control medications as ordered  - Monitor electrolytes and administer replacement therapy as ordered  Outcome: Progressing     Problem: SAFETY ADULT  Goal: Patient will remain free of falls  Description: INTERVENTIONS:  - Educate patient/family on patient safety including physical limitations  - Instruct patient to call for assistance with activity   - Consult OT/PT to assist with strengthening/mobility   - Keep Call bell within reach  - Keep bed low and locked with side rails adjusted as appropriate  - Keep care items and personal belongings within reach  - Initiate and maintain comfort rounds  - Make Fall Risk Sign visible to staff  - Offer Toileting every 2 Hours, in advance of need  - Initiate/Maintain bed alarm  - Obtain necessary fall risk management equipment  - Apply yellow socks and bracelet for high fall risk patients  - Consider moving patient to room near nurses station  Outcome: Progressing

## 2024-08-20 NOTE — DISCHARGE SUMMARY
[facility]  Discharge- Rosette Irwin 1955, 69 y.o. female MRN: [id number]  Unit/Bed#: S -01 Encounter: [id number]  Primary Care Provider: Vera Mark DO   Date and time admitted to hospital: 8/19/2024 12:05 PM    * Dizziness  Assessment & Plan  Patient presented with recurrent dizziness with exam finding of nystagmus  MRI of the brain negative  meclizine 25 mg every 8 hours  PT eval appreciated.  Patient is stable for discharge home with outpatient vestibular PT    Mixed hyperlipidemia  Assessment & Plan  Lipid panel abnormal  Continue Crestor 5 mg daily--encouraged patient to be compliant with daily use.    Meningioma (HCC)  Assessment & Plan  Incidentally noted 8 mm nodular extra-axial lesion along the right parietal convexity may represent a meningioma in the absence of any known primary malignancy.   Routine outpatient neurosurgery appointment to discuss follow-up management    Gastroparesis  Assessment & Plan  Previously diagnosed, without history of diabetes      Medical Problems       Resolved Problems  Date Reviewed: 8/20/2024   None       Discharging Physician / Practitioner: Lynsey Salazar PA-C  PCP: Vera Mark DO  Admission Date:   Admission Orders (From admission, onward)       Ordered        08/19/24 1745  INPATIENT ADMISSION  Once                          Discharge Date: 08/20/24    Consultations During Hospital Stay:  none    Procedures Performed:   MRI brain    Significant Findings / Test Results:   As above    Incidental Findings:   Meningioma, this was reviewed with patient and her daughter      Test Results Pending at Discharge (will require follow up):   None     Outpatient Tests Requested:  Repeat imaging to follow-up on meningioma    Complications: None    Reason for Admission: Dizziness    Hospital Course:   Rosette Irwin is a 69 y.o. female patient who originally presented to the hospital on 8/19/2024 due to recurrent dizziness, be due to  "vertigo. Patient was noted to have nystagmus on exam.  She was placed on stroke pathway and had an MRI of the brain which was negative for stroke.  Incidentally meningioma was noted.  This is not felt to be the cause of the patient's symptoms.  Patient was seen by PT and cleared for discharge home with outpatient vestibular therapy.  Patient was recommended to follow-up with ENT if her symptoms persist despite vestibular therapy and meclizine but she was noting improvement already while here.      The patient, initially admitted to the hospital as inpatient, was discharged earlier than expected given the following: improvement and no stroke.    Please see above list of diagnoses and related plan for additional information.     Condition at Discharge: stable    Discharge Day Visit / Exam:   Subjective: Patient still with persistent dizziness but improved over the course of the day.  Vitals: Blood Pressure: 140/79 (08/20/24 0805)  Pulse: 75 (08/20/24 0805)  Temperature: 98.2 °F (36.8 °C) (08/20/24 0805)  Temp Source: Oral (08/20/24 0500)  Respirations: 16 (08/20/24 0500)  Height: 5' 2\" (157.5 cm) (08/19/24 1207)  Weight - Scale: 72.1 kg (158 lb 15.2 oz) (08/19/24 1207)  SpO2: 94 % (08/20/24 0805)  Exam:   Physical Exam  Vitals reviewed.   Constitutional:       General: She is not in acute distress.     Appearance: Normal appearance. She is not ill-appearing, toxic-appearing or diaphoretic.   HENT:      Head: Normocephalic and atraumatic.      Nose: No congestion or rhinorrhea.   Eyes:      General: No scleral icterus.        Right eye: No discharge.         Left eye: No discharge.      Conjunctiva/sclera: Conjunctivae normal.      Comments: Patient with evidence of nystagmus, appears to be primarily vertical   Cardiovascular:      Rate and Rhythm: Normal rate and regular rhythm.      Heart sounds: No murmur heard.  Pulmonary:      Effort: No respiratory distress.      Breath sounds: No stridor. No wheezing, rhonchi or " rales.   Abdominal:      General: There is no distension.      Tenderness: There is no abdominal tenderness. There is no guarding.   Musculoskeletal:      Right lower leg: No edema.      Left lower leg: No edema.   Skin:     Coloration: Skin is not jaundiced or pale.      Findings: No bruising, erythema, lesion or rash.   Neurological:      Mental Status: She is alert.      Comments: Awake alert interactive, good historian.  No confusion noted.  No tremor.   Psychiatric:         Mood and Affect: Mood normal.          Discussion with Family: Updated  (daughter) via phone.    Discharge instructions/Information to patient and family:   See after visit summary for information provided to patient and family.      Provisions for Follow-Up Care:  See after visit summary for information related to follow-up care and any pertinent home health orders.      Mobility at time of Discharge:   Basic Mobility Inpatient Raw Score: 20  JH-HLM Goal: 6: Walk 10 steps or more  JH-HLM Achieved: 7: Walk 25 feet or more  HLM Goal achieved. Continue to encourage appropriate mobility.     Disposition:   Home    Planned Readmission: none     Discharge Statement:  I spent 40 minutes discharging the patient. This time was spent on the day of discharge. I had direct contact with the patient on the day of discharge. Greater than 50% of the total time was spent examining patient, answering all patient questions, arranging and discussing plan of care with patient as well as directly providing post-discharge instructions.  Additional time then spent on discharge activities.  Evaluated patient initially in the morning and came back a second time to speak with her again in the afternoon.  Spoke with PT and case management bedside nursing rounds performed.    Discharge Medications:  See after visit summary for reconciled discharge medications provided to patient and/or family.      **Please Note: This note may have been constructed using  a voice recognition system**

## 2024-08-20 NOTE — DISCHARGE INSTR - AVS FIRST PAGE
Dear Rosette Irwin,     It was our pleasure to care for you here at [facility].  It is our hope that we were always able to exceed the expected standards for your care during your stay.  You were hospitalized due to vertigo.  You were cared for on the 3rd floor by Lynsey Salazar PA-C under the service of Kacey Hawkins MD with the [facility]ist [facility] who covers for your primary care physician (PCP), Vera Mark DO, while you were hospitalized.  If you have any questions or concerns related to this hospitalization, you may contact us at .  For follow up as well as any medication refills, we recommend that you follow up with your primary care physician.  A registered nurse will reach out to you by phone within a few days after your discharge to answer any additional questions that you may have after going home.  However, at this time we provide for you here, the most important instructions / recommendations at discharge:     Notable Medication Adjustments -   Meclizine 25 mg three times a day  Testing Required after Discharge -   Repeat brain imaging to follow-up on meningioma   ** Please contact your PCP to request testing orders for any of the testing recommended here **  Important follow up information -   Family doctor  Vestibular PT  ENT to discuss recurrent vertigo  Neurosurgery to discuss meningioma follow-up  Other Instructions -   No driving until dizziness resolves  Please review this entire after visit summary as additional general instructions including medication list, appointments, activity, diet, any pertinent wound care, and other additional recommendations from your care team that may be provided for you.      Sincerely,     Lynsey Salazar PA-C

## 2024-08-20 NOTE — ASSESSMENT & PLAN NOTE
Patient presented with recurrent dizziness with exam finding of nystagmus  MRI of the brain negative  meclizine 25 mg every 8 hours  PT eval appreciated.  Patient is stable for discharge home with outpatient vestibular PT

## 2024-08-20 NOTE — UTILIZATION REVIEW
Initial Clinical Review    Admission: Date/Time/Statement:   Admission Orders (From admission, onward)       Ordered        08/19/24 1745  INPATIENT ADMISSION  Once                          Orders Placed This Encounter   Procedures    INPATIENT ADMISSION     Standing Status:   Standing     Number of Occurrences:   1     Order Specific Question:   Level of Care     Answer:   Med Surg [16]     Order Specific Question:   Estimated length of stay     Answer:   More than 2 Midnights     Order Specific Question:   Certification     Answer:   I certify that inpatient services are medically necessary for this patient for a duration of greater than two midnights. See H&P and MD Progress Notes for additional information about the patient's course of treatment.     ED Arrival Information       Expected   -    Arrival   8/19/2024 12:05    Acuity   Urgent              Means of arrival   Ambulance    Escorted by   [facility]   Hospitalist    Admission type   Emergency              Arrival complaint   ems             Chief Complaint   Patient presents with    Dizziness     Dizzy and weak since Friday. Hx vertigo       Initial Presentation: 69 y.o. female to ED presents for Dizziness x3 days. Per pt, 5 days ago he noted this severe headache which resolved on its own as the first time she experienced. This past Saturday she was out shopping suddenly felt a feeling of dizziness. Lying edy did not improve dizziness. In ED, given meclizine, Valium, and headache cocktail with improvement in symptoms. Notes persistent Tinnitus in both ears. Reports a whooshing sound present since onset of symptoms. History of vertigo several years ago which was treated with vestibular therapy. PMH for  gastroparesis, hypothyroidism, hypertension, hyperlipidemia.  Admit to Inpatient Dx; Dizziness. Pt has horizontal nystagmus.   Hints exam; (Head impulse testing was abnormal), there was no vertical skew.  And nystagmus was  horizontal.  This likely represents a peripheral process this coupled with the fact that she has tinnitus and abnormal hearing on the right ear.   Plan; MRI Brain. Continue Meclizine. Zofran prn. Give aspirin load. Permissive hypertension. Statin. Continue Crestor and levothyroxine.  On exam; Right beating nystagmus present in both eyes.   Anticipated Length of Stay/Certification Statement: Patient will be admitted on an inpatient basis with an anticipated length of stay of greater than 2 midnights secondary to stroke workup.     Date: 8/20   Day 2:   Incidental Finding; Incidentally noted 8 mm nodular extra-axial lesion along the right parietal convexity may represent a meningioma in the absence of any known primary malignancy   F/u required: Postcontrast imaging recommended for further evaluation.   F/u should be done in within 3 months with Neurosurgery.    MRI Brain negative. Meclizine 25 mg q8h.   Plan for d/c today with oupt vestibular PT.     ED Triage Vitals   Temperature Pulse Respirations Blood Pressure SpO2 Pain Score   08/19/24 1207 08/19/24 1207 08/19/24 1207 08/19/24 1207 08/19/24 1207 08/19/24 1616   97.8 °F (36.6 °C) 69 18 (!) 190/90 98 % 5     Weight (last 2 days)       Date/Time Weight    08/19/24 1207 72.1 (158.95)            Vital Signs (last 3 days)       Date/Time Temp Pulse Resp BP MAP (mmHg) SpO2 O2 Device Patient Position - Orthostatic VS Divina Coma Scale Score Pain    08/20/24 1333 -- -- -- -- -- -- -- -- -- No Pain    08/20/24 0900 -- -- -- -- -- -- None (Room air) -- 15 No Pain    08/20/24 08:05:17 98.2 °F (36.8 °C) 75 -- 140/79 99 94 % -- -- -- --    08/20/24 0700 -- -- -- -- -- -- -- -- 15 --    08/20/24 0500 98.5 °F (36.9 °C) 56 16 128/69 -- 95 % None (Room air) Lying 15 No Pain    08/20/24 0300 -- -- -- -- -- -- -- -- 15 --    08/20/24 0100 98.7 °F (37.1 °C) 58 16 132/76 95 95 % -- Lying 15 --    08/19/24 2300 98.5 °F (36.9 °C) 65 16 120/68 85 95 % None (Room air) Lying 15 --     08/19/24 2200 98.6 °F (37 °C) -- 16 123/70 88 -- -- Lying 15 --    08/19/24 2100 98.6 °F (37 °C) 59 16 124/74 91 95 % None (Room air) Lying 15 --    08/19/24 2000 98.5 °F (36.9 °C) 65 16 132/72 92 95 % None (Room air) Lying 15 3    08/19/24 1900 -- 66 17 128/69 94 95 % None (Room air) Sitting -- --    08/19/24 1700 -- 66 17 122/64 86 96 % None (Room air) Sitting -- --    08/19/24 1616 -- -- -- -- -- -- -- -- -- 5    08/19/24 1445 -- 72 17 142/69 99 95 % -- -- -- --    08/19/24 1220 -- -- -- -- -- -- -- -- 15 --    08/19/24 1207 97.8 °F (36.6 °C) 69 18 190/90 -- 98 % None (Room air) Sitting -- --              Pertinent Labs/Diagnostic Test Results:   Radiology:  MRI brain wo contrast   Final Interpretation by Taras Ferrer MD (08/20 0910)      No mass effect, acute intracranial hemorrhage or evidence of recent infarction.      Incidentally noted 8 mm nodular extra-axial lesion along the right parietal convexity may represent a meningioma in the absence of any known primary malignancy. Postcontrast imaging recommended for further evaluation.      Workstation performed: [id number]         CT head without contrast   Final Interpretation by Giovani Srivastava MD (08/19 1506)      No acute intracranial abnormality.                  Workstation performed: [id number]           Cardiology:  No orders to display     GI:  No orders to display           Results from last 7 days   Lab Units 08/20/24  0543 08/19/24  1221   WBC Thousand/uL 6.35 7.93   HEMOGLOBIN g/dL 14.4 15.2   HEMATOCRIT % 44.2 46.2*   PLATELETS Thousands/uL 220 225   TOTAL NEUT ABS Thousands/µL 3.14 5.45         Results from last 7 days   Lab Units 08/20/24  0543 08/19/24  1221   SODIUM mmol/L 137 135   POTASSIUM mmol/L 3.7 5.5*   CHLORIDE mmol/L 106 104   CO2 mmol/L 23 23   ANION GAP mmol/L 8 8   BUN mg/dL 16 13   CREATININE mg/dL 0.74 0.78   EGFR ml/min/1.73sq m 82 77   CALCIUM mg/dL 8.9 9.3     Results from last 7 days   Lab Units 08/19/24  1221   AST U/L 24    ALT U/L 10   ALK PHOS U/L 36   TOTAL PROTEIN g/dL 7.5   ALBUMIN g/dL 4.4   TOTAL BILIRUBIN mg/dL 1.17*         Results from last 7 days   Lab Units 08/20/24  0543 08/19/24  1221   GLUCOSE RANDOM mg/dL 75 100         Results from last 7 days   Lab Units 08/20/24  0543   HEMOGLOBIN A1C % 5.5   EAG mg/dl 111           Results from last 7 days   Lab Units 08/19/24  1440 08/19/24  1221   HS TNI 0HR ng/L  --  3   HS TNI 2HR ng/L 4  --    HSTNI D2 ng/L 1  --          ED Treatment-Medication Administration from 08/19/2024 1205 to 08/19/2024 1945         Date/Time Order Dose Route Action     08/19/2024 1402 metoclopramide (REGLAN) injection 10 mg 10 mg Intravenous Given     08/19/2024 1402 diphenhydrAMINE (BENADRYL) injection 25 mg 25 mg Intravenous Given     08/19/2024 1406 magnesium sulfate 2 g/50 mL IVPB (premix) 2 g 2 g Intravenous New Bag     08/19/2024 1401 sodium chloride 0.9 % bolus 500 mL 500 mL Intravenous New Bag     08/19/2024 1619 ketorolac (TORADOL) injection 15 mg 15 mg Intravenous Given     08/19/2024 1616 acetaminophen (TYLENOL) tablet 975 mg 975 mg Oral Given     08/19/2024 1616 meclizine (ANTIVERT) tablet 25 mg 25 mg Oral Given     08/19/2024 1619 diazepam (VALIUM) injection 2.5 mg 2.5 mg Intravenous Given            Past Medical History:   Diagnosis Date    Colon polyp     Disease of thyroid gland     GERD (gastroesophageal reflux disease)     Hypertension     Vertigo      Present on Admission:   Mixed hyperlipidemia   Gastroparesis   Dizziness      Admitting Diagnosis: Dizziness [R42]  Headache [R51.9]  Age/Sex: 69 y.o. female  Admission Orders:  Scheduled Medications:  aspirin, 81 mg, Oral, Daily  enoxaparin, 40 mg, Subcutaneous, Daily  famotidine, 20 mg, Oral, Daily  levothyroxine, 75 mcg, Oral, Early Morning  meclizine, 25 mg, Oral, Q8H NANETTE  pravastatin, 40 mg, Oral, Daily With Dinner      Continuous IV Infusions: None     PRN Meds: None       None    Network Utilization Review  Department  ATTENTION: Please call with any questions or concerns to [phone] and carefully listen to the prompts so that you are directed to the right person. All voicemails are confidential.   For Discharge needs, contact Care Management DC Support Team at [phone] opt. 2  Send all requests for admission clinical reviews, approved or denied determinations and any other requests to dedicated fax number below belonging to the campus where the patient is receiving treatment. List of dedicated fax numbers for the Facilities:  FACILITY NAME UR FAX NUMBER   ADMISSION DENIALS (Administrative/Medical Necessity) [phone]   DISCHARGE SUPPORT TEAM (NETWORK) [phone]   PARENT CHILD HEALTH (Maternity/NICU/Pediatrics) [phone]   [facility] [phone]   [facility] [phone]   [facility] [phone]   General acute hospital [phone]   [facility] [phone]   [facility] [phone]   General acute hospital [phone]   [facility] [phone]   [facility] [phone]   Replaced by [facility] [phone]   [facility] [phone]   [facility] [phone]

## 2024-08-20 NOTE — PLAN OF CARE
Problem: PHYSICAL THERAPY ADULT  Goal: Performs mobility at highest level of function for planned discharge setting.  See evaluation for individualized goals.  Description: Treatment/Interventions: Functional transfer training, LE strengthening/ROM, Elevations, Therapeutic exercise, Endurance training, Patient/family training, Equipment eval/education, Bed mobility, Gait training, Compensatory technique education  Equipment Recommended: Walker       See flowsheet documentation for full assessment, interventions and recommendations.  8/20/2024 1422 by Martha Cook PT  Note: Prognosis: Good  Problem List: Impaired balance, Decreased mobility  Assessment: Rosette Irwin is a 69 y.o. Female who presents to [facility] on 8/19/24 due to dizziness and diagnosis of dizziness. Orders for PT eval and treat received, w/ activity orders of up and OOB as tolerated. Comorbidities affecting pt's functional mobility at time of evaluation include: h/o vertigo, gastroparesis, HTN. Personal factors affecting DC include: lives in 2 story house, stairs to enter home, and inability to navigate level surfaces w/o external assistance. At baseline, pt mobilizes independently w/ no AD, and w/ 0 fall(s) in the previous 6 months. Upon evaluation, pt presents w/ the following deficits: impaired balance, decreased endurance/activity tolerance, and gait deviations. Pt currently requires  supervision for bed mobility, supervision for transfers, supervision w/ RW for ambulation, supervision w/ B UE support for stair negotiation. Pt's clinical presentation is unstable/unpredictable due to abnormal lab values, need for increased assistance w/ functional mobility compared to baseline, recent drastic decline in mobility status, ongoing medical management. From a PT/mobility standpoint given the above findings, DC recommendation is level: III (Minimum Rehab Resource Intensity). During current admission, pt will benefit from continued skilled inpatient PT  in the acute care setting in order to address the above deficits and to maximize function and mobility prior to DC from acute care.  Barriers to Discharge:  (pt reports she can remain on the 1st floor of her house if needed, pt education to continue to use RW for improved ambulatory balance and to have her  assist w/ stair negotiation w/ pt confirming understanding)     Rehab Resource Intensity Level, PT: III (Minimum Resource Intensity) (OPPT for vestibular therapy)    See flowsheet documentation for full assessment.

## 2024-08-20 NOTE — ASSESSMENT & PLAN NOTE
Incidentally noted 8 mm nodular extra-axial lesion along the right parietal convexity may represent a meningioma in the absence of any known primary malignancy.   Routine outpatient neurosurgery appointment to discuss follow-up management

## 2024-08-20 NOTE — ASSESSMENT & PLAN NOTE
Lipid panel abnormal  Continue Crestor 5 mg daily--encouraged patient to be compliant with daily use.

## 2024-08-20 NOTE — INCIDENTAL FINDINGS
The following findings require follow up:  Radiographic finding   Finding: Incidentally noted 8 mm nodular extra-axial lesion along the right parietal convexity may represent a meningioma in the absence of any known primary malignancy.    Follow up required: Postcontrast imaging recommended for further evaluation.    Follow up should be done within 3 months with neurosurgery    Incidental finding results were discussed with the Patient and daughter Paulina by Lynsey Salazar PA-C on 08/20/24.   They expressed understanding and all questions answered.

## 2024-08-20 NOTE — PHYSICAL THERAPY NOTE
PHYSICAL THERAPY EVALUATION NOTE          Patient Name: Rosette Irwin  Today's Date: 2024          AGE:   69 y.o.  Mrn:   [id number]  ADMIT DX:  Dizziness [R42]  Headache [R51.9]    Past Medical History:  Past Medical History:   Diagnosis Date    Colon polyp     Disease of thyroid gland     GERD (gastroesophageal reflux disease)     Hypertension     Vertigo        Past Surgical History:  Past Surgical History:   Procedure Laterality Date    COLONOSCOPY      GALLBLADDER SURGERY      OOPHORECTOMY      UPPER GASTROINTESTINAL ENDOSCOPY       Length Of Stay: 1        PHYSICAL THERAPY EVALUATION:    Patient's identity confirmed via 2 patient identifiers (full name and ) at start of session       24 1333   PT Last Visit   PT Visit Date 24   Note Type   Note type Evaluation   Pain Assessment   Pain Assessment Tool 0-10   Pain Score No Pain   Restrictions/Precautions   Weight Bearing Precautions Per Order No   Other Precautions Chair Alarm;Bed Alarm;Fall Risk   Home Living   Type of Home House   Home Layout Two level;Stairs to enter with rails  (3 GEOVANNA, pt's bedroom and bathroom are on the 2nd floor, has a bedroom and full bath availabe on 1st floor if needed)   Bathroom Shower/Tub Tub/shower unit   Home Equipment Cane   Prior Function   Level of [place] Independent with functional mobility;Independent with ADLs;Independent with IADLS   Lives With Spouse  (pt reports her  is home at all times and available to assist as needed)   Receives Help From Family   IADLs Independent with driving;Independent with meal prep;Independent with medication management   Falls in the last 6 months 0   Comments At baseline pt amb ind w/o AD, performs all ADLs and IADLs ind   General   Family/Caregiver Present No   Cognition   Overall Cognitive Status WFL   Arousal/Participation Cooperative   Orientation Level Oriented X4   Memory Within functional limits    Following Commands Follows all commands and directions without difficulty   Comments Pt ID via name and ; pt agreeable to PT eval and mobility, pt very pleasant throughout   Subjective   Subjective pt reports continuously feeling off balance, increased nausea w/ mobility/activity   RLE Assessment   RLE Assessment WFL  (grossly asssessed w/ functional mobility)   LLE Assessment   LLE Assessment WFL  (grossly asssessed w/ functional mobility)   Light Touch   RLE Light Touch Grossly intact   LLE Light Touch Grossly intact   Bed Mobility   Supine to Sit 6  Modified independent   Additional items HOB elevated;Bedrails;Increased time required;Verbal cues   Additional Comments able to maintain sitting balance at EOB w/ supervision, reports increased dizziness w/ changes in position   Transfers   Sit to Stand 5  Supervision   Additional items Assist x 1;Increased time required;Verbal cues;Armrests   Stand to Sit 5  Supervision   Additional items Assist x 1;Armrests;Increased time required;Verbal cues   Toilet transfer 5  Supervision   Additional items Assist x 1;Increased time required;Standard toilet   Additional Comments 4 sit<>stand transfer trials performed, able to maintain static standing balance for multiple trials at bathroom sink and in front of chair   Ambulation/Elevation   Gait pattern Decreased foot clearance;Short stride;Excessively slow   Gait Assistance 5  Supervision  (min Ax1 w/ SPC for approx 2 steps, transitioned to RW for increased support w/ pt able to ambulate w/ supervision)   Additional items Assist x 1;Verbal cues   Assistive Device Rolling walker  (SPC for 2')   Distance 20'+25'  (limited by onset of nausea)   Stair Management Assistance 5  Supervision   Additional items Assist x 1;Verbal cues   Stair Management Technique Two rails;Step to pattern   Number of Stairs 3   Ambulation/Elevation Additional Comments when using RW pt demonstrated improved ambulatory balance, pt subjectively  reporting increased support w/ RW vs SPC   Balance   Static Sitting Fair +   Dynamic Sitting Fair   Static Standing Fair   Dynamic Standing Fair   Ambulatory Fair -  (w/ RW)   Activity Tolerance   Activity Tolerance Other (Comment)  (pt limited by nausea, RN notified and arrived to room to provide medication)   Medical Staff Made Aware DOMINGA Menon, PIERRE Menon   Nurse Made Aware LESTER Brown   Assessment   Prognosis Good   Problem List Impaired balance;Decreased mobility   Assessment Rosette Irwin is a 69 y.o. Female who presents to [facility] on 8/19/24 due to dizziness and diagnosis of dizziness. Orders for PT eval and treat received, w/ activity orders of up and OOB as tolerated. Comorbidities affecting pt's functional mobility at time of evaluation include: h/o vertigo, gastroparesis, HTN. Personal factors affecting DC include: lives in 2 story house, stairs to enter home, and inability to navigate level surfaces w/o external assistance. At baseline, pt mobilizes independently w/ no AD, and w/ 0 fall(s) in the previous 6 months. Upon evaluation, pt presents w/ the following deficits: impaired balance, decreased endurance/activity tolerance, and gait deviations. Pt currently requires  supervision for bed mobility, supervision for transfers, supervision w/ RW for ambulation, supervision w/ B UE support for stair negotiation. Pt's clinical presentation is unstable/unpredictable due to abnormal lab values, need for increased assistance w/ functional mobility compared to baseline, recent drastic decline in mobility status, ongoing medical management. From a PT/mobility standpoint given the above findings, DC recommendation is level: III (Minimum Rehab Resource Intensity). During current admission, pt will benefit from continued skilled inpatient PT in the acute care setting in order to address the above deficits and to maximize function and mobility prior to DC from acute care.   Barriers to Discharge   (pt reports she can remain  on the 1st floor of her house if needed, pt education to continue to use RW for improved ambulatory balance and to have her  assist w/ stair negotiation w/ pt confirming understanding)   Goals   Patient Goals to feel better   STG Expiration Date 08/30/24   Short Term Goal #1 Pt will: perform bed mobility w/ mod I to decrease pt's burden of care and increase pt's independence w/ repositioning in bed; perform transfers w/ mod I to promote OOB mobility; ambulate at least 350' w/ LRAD and mod I to increase pt's ambulatory endurance/tolerance; negotiate at least 12 stair(s) w/ UE support and mod I to facilitate pt returning to previous living environment; increase all balance ratings by at least 1 grade to decrease pt's risk of falls   PT Treatment Day 0   Plan   Treatment/Interventions Functional transfer training;LE strengthening/ROM;Elevations;Therapeutic exercise;Endurance training;Patient/family training;Equipment eval/education;Bed mobility;Gait training;Compensatory technique education   PT Frequency 2-3x/wk   Discharge Recommendation   Rehab Resource Intensity Level, PT III (Minimum Resource Intensity)  (OPPT for vestibular therapy)   Equipment Recommended Walker   Walker Package Recommended Wheeled walker   Change/add to Walker Package? No   AM-PAC Basic Mobility Inpatient   Turning in Flat Bed Without Bedrails 4   Lying on Back to Sitting on Edge of Flat Bed Without Bedrails 4   Moving Bed to Chair 3   Standing Up From Chair Using Arms 3   Walk in Room 3   Climb 3-5 Stairs With Railing 3   Basic Mobility Inpatient Raw Score 20   Basic Mobility Standardized Score 43.99   [facility] Highest Level Of Mobility   -HLM Goal 6: Walk 10 steps or more   -HLM Achieved 7: Walk 25 feet or more   End of Consult   Patient Position at End of Consult Bedside chair;Bed/Chair alarm activated;All needs within reach       The patient's AM-PAC Basic Mobility Inpatient Short Form Raw Score is 20. A Raw score of greater  than 16 suggests the patient may benefit from discharge to home. Please also refer to the recommendation of the Physical Therapist for safe discharge planning.    Pt will benefit from skilled inpatient PT during this admission in order to facilitate progress towards goals and to maximize functional independence prior to DC      DC rec: level III (Minimum Rehab Resource Intensity), OPPT for vestibular therapy         Martha Cook, PT, DPT  08/20/24

## 2024-08-20 NOTE — CASE MANAGEMENT
Case Management Discharge Planning Note    Patient name Rosette Irwin  Location S /S -01 MRN [id number]  : 1955 Date 2024       Current Admission Date: 2024  Current Admission Diagnosis:Dizziness   Patient Active Problem List    Diagnosis Date Noted Date Diagnosed    Meningioma (HCC) 2024     Gastroparesis 2021     Abdominal pain 2020     Chest pain 2020     Dizziness 2020     Hypertensive urgency 2020     Mixed hyperlipidemia 2015     GERD (gastroesophageal reflux disease) 2015     Hypothyroidism 10/25/2012       LOS (days): 1  Geometric Mean LOS (GMLOS) (days): 1.9  Days to GMLOS:1     OBJECTIVE:  Risk of Unplanned Readmission Score: 5.95         Current admission status: Inpatient   Preferred Pharmacy:   [facility]/pharmacy #[id number] - [place] - [address]  [address] [address]  Phone: [phone] Fax: [phone]    [facility] [address]  [address] [place] [address]  Phone: [phone] Fax: [phone]    Primary Care Provider: Vera Mark DO    Primary Insurance: [facility]  Secondary Insurance:     DISCHARGE DETAILS:    DME Referral Provided  Referral made for DME?: Yes  DME referral completed for the following items:: Walker  DME Supplier Name:: [employer]    Other Referral/Resources/Interventions Provided:  Referral Comments: RW delivered to bedside

## 2024-08-21 NOTE — UTILIZATION REVIEW
NOTIFICATION OF ADMISSION DISCHARGE   This is a Notification of Discharge from [facility]. Please be advised that this patient has been discharge from our facility. Below you will find the admission and discharge date and time including the patient’s disposition.   UTILIZATION REVIEW CONTACT:  Glenda Rosales  Utilization   Network Utilization Review Department  Phone: [phone] x carefully listen to the prompts. All voicemails are confidential.  Email: [email or web address]     ADMISSION INFORMATION  PRESENTATION DATE: 8/19/2024 12:05 PM  OBERVATION ADMISSION DATE: N/A  INPATIENT ADMISSION DATE: 8/19/24  5:45 PM   DISCHARGE DATE: 8/20/2024  4:01 PM   DISPOSITION:Home/Self Care    Network Utilization Review Department  ATTENTION: Please call with any questions or concerns to [phone] and carefully listen to the prompts so that you are directed to the right person. All voicemails are confidential.   For Discharge needs, contact Care Management DC Support Team at [phone] opt. 2  Send all requests for admission clinical reviews, approved or denied determinations and any other requests to dedicated fax number below belonging to the campus where the patient is receiving treatment. List of dedicated fax numbers for the Facilities:  FACILITY NAME UR FAX NUMBER   ADMISSION DENIALS (Administrative/Medical Necessity) [phone]   DISCHARGE SUPPORT TEAM ([facility]) [phone]   PARENT CHILD HEALTH (Maternity/NICU/Pediatrics) [phone]   [facility] [phone]   [facility] [phone]   [facility] [phone]   [facility] [phone]   [facility] [phone]   [facility] [phone]   [facility] [phone]   [facility] [phone]    [facility] [phone]   [facility] [phone]   [facility] [phone]   [facility] [phone]

## 2024-08-28 ENCOUNTER — OFFICE VISIT (OUTPATIENT)
Dept: NEUROSURGERY | Facility: CLINIC | Age: 69
End: 2024-08-28
Payer: COMMERCIAL

## 2024-08-28 VITALS
WEIGHT: 154 LBS | BODY MASS INDEX: 28.34 KG/M2 | HEIGHT: 62 IN | TEMPERATURE: 97 F | SYSTOLIC BLOOD PRESSURE: 136 MMHG | OXYGEN SATURATION: 99 % | DIASTOLIC BLOOD PRESSURE: 78 MMHG | HEART RATE: 77 BPM

## 2024-08-28 DIAGNOSIS — R42 DIZZINESS: Primary | ICD-10-CM

## 2024-08-28 DIAGNOSIS — D32.9 MENINGIOMA (HCC): ICD-10-CM

## 2024-08-28 PROCEDURE — 99204 OFFICE O/P NEW MOD 45 MIN: CPT | Performed by: PHYSICIAN ASSISTANT

## 2024-08-28 RX ORDER — MULTIVITAMIN
1 TABLET ORAL DAILY
COMMUNITY

## 2024-08-28 NOTE — LETTER
August 28, 2024     Lynsey Salazar PA-C  [address] [place] [id number]    Patient: Rosette Irwin   YOB: 1955   Date of Visit: 8/28/2024       Dear Dr. Salazar:    Thank you for referring Rosette Irwin to me for evaluation. Below are my notes for this consultation.    If you have questions, please do not hesitate to call me. I look forward to following your patient along with you.         Sincerely,        Daron Jefferson PA-C        CC: No Recipients    Daron Jefferson PA-C  8/28/2024  6:30 PM  Sign when Signing Visit  Neurosurgery Office Note  Rosette Irwin 69 y.o. female MRN: [id number]      Assessment & Plan     Patient is a 69 yrs old pleasant woman with PMHx of Hypertensive urgency,Gastroparesis, GERD, Hypothyroidism,Hyperlipidemia, Vertigo and dizziness referred by IM for abnormal brain MRI. Patient reported She had vertigo about 1-2 months ago and then continuous dizziness, sometimes associated with nausea. Brain image demonstrates an 8 mm right parietal convexity meningioma. MRI brain was done without contrast. Patient was started on Meclizine, and reports no improvement. She denies headache, seizure, vision issues, weakness in the extremities, or B/B dysfunction.     Patient denies Hx of DM, CHF, stroke, seizures, bleeding disorder except baby ASA. No hx of Smoking cigarettes, no Hx of Alcohol ingestion.    Exam-A&OX3, PERRL, EOMI 3 mm conj bilaterally. SF. Michele. Finger to nose test normal and without drift bilaterally. Strength 5/5 bilaterally and sensation to LT intact bilaterally. DTR 2+, no clonus bilaterally.    Hx, Exam, and images reviewed with the patient. Mx plan discussed. I would recommend MRI of brain w/wo contrast. CTA to evaluate any vascular abnormality. Advised to continue follow up with ENT. F/U after images results. Questions and concerns were answered to patient's satisfaction. Patient Verbalized understandings and agreed with the Plan.    Plan:  MRI of brain w/wo  "contrast  CTA head & Neck w/wo contrast  F/U after images results  Call office with any new neurological symptoms.  Call with questions or cocnerns        Chief Complaint   Patient presents with   • Consult     NEWPT MENINGIOMA MRI Brain 8/20/24        HISTORY    History of Present Illness     C/C; \"69 y.o. year old female here referred by IM for abnormal Brain MRI\"    HPI    See detailed discussion above.    REVIEW OF SYSTEMS    Review of Systems   Constitutional: Negative.    HENT:  Positive for trouble swallowing (left ear - patient reports she hears heart pulsating -- intensified ringing in BL ears but right is worse).    Eyes:  Positive for visual disturbance.        Right eye blurrier, \"feels\" right eye   Respiratory: Negative.     Cardiovascular: Negative.    Gastrointestinal:  Positive for nausea (slight nausea).   Endocrine: Negative.    Genitourinary: Negative.    Musculoskeletal:  Positive for gait problem (unsteadiness).   Skin: Negative.    Allergic/Immunologic: Negative.    Neurological:  Positive for dizziness (w/ rapid movement) and headaches (dull headaches; constant since hospital discharge.).   Hematological: Negative.        ROS obtained by MA. Reviewed. See HPI.     Meds/Allergies     Current Outpatient Medications   Medication Sig Dispense Refill   • amLODIPine (NORVASC) 5 mg tablet Take 1 tablet (5 mg total) by mouth daily 30 tablet 0   • DIGESTIVE ENZYMES PO Take by mouth     • famotidine (PEPCID) 20 mg tablet      • levothyroxine 75 mcg tablet Take 75 mcg by mouth daily     • meclizine (ANTIVERT) 25 mg tablet Take 1 tablet (25 mg total) by mouth every 8 (eight) hours Until resolution of symptoms 30 tablet 0   • Multiple Vitamin (multivitamin) tablet Take 1 tablet by mouth daily     • rosuvastatin (CRESTOR) 5 mg tablet Take 5 mg by mouth daily     • VITAMIN D PO Take by mouth       No current facility-administered medications for this visit.       Allergies   Allergen Reactions   • " "Atorvastatin Myalgia   • Sulfa Antibiotics Nausea Only   • Medical Tape Rash       PAST HISTORY    Past Medical History:   Diagnosis Date   • Colon polyp    • Disease of thyroid gland    • GERD (gastroesophageal reflux disease)    • Hypertension    • Vertigo        Past Surgical History:   Procedure Laterality Date   • COLONOSCOPY     • GALLBLADDER SURGERY     • OOPHORECTOMY     • UPPER GASTROINTESTINAL ENDOSCOPY         Social History     Tobacco Use   • Smoking status: Never   • Smokeless tobacco: Never   Vaping Use   • Vaping status: Never Used   Substance Use Topics   • Alcohol use: Not Currently   • Drug use: Never       Family History   Problem Relation Age of Onset   • Prostate cancer Father    • Breast cancer Paternal Grandmother    • Breast cancer Paternal Aunt    • Breast cancer Cousin    • Lymphoma Cousin    • Breast cancer Cousin    • Cancer Cousin    • Stomach cancer Cousin    • Squamous cell carcinoma Daughter          Above history personally reviewed.       EXAM    Vitals:Blood pressure 136/78, pulse 77, temperature (!) 97 °F (36.1 °C), temperature source Temporal, height 5' 2\" (1.575 m), weight 69.9 kg (154 lb), SpO2 99%.,Body mass index is 28.17 kg/m².     Physical Exam  Constitutional:       Appearance: Normal appearance.   HENT:      Head: Normocephalic and atraumatic.   Eyes:      Extraocular Movements: Extraocular movements intact.      Pupils: Pupils are equal, round, and reactive to light.   Pulmonary:      Effort: Pulmonary effort is normal.   Musculoskeletal:         General: Normal range of motion.      Cervical back: Normal range of motion.   Neurological:      General: No focal deficit present.      Mental Status: She is alert and oriented to person, place, and time.      Coordination: Finger-Nose-Finger Test normal.      Deep Tendon Reflexes:      Reflex Scores:       Tricep reflexes are 2+ on the right side and 2+ on the left side.       Bicep reflexes are 2+ on the right side and 2+ " on the left side.       Brachioradialis reflexes are 2+ on the right side and 2+ on the left side.       Patellar reflexes are 2+ on the right side and 2+ on the left side.       Achilles reflexes are 2+ on the right side and 2+ on the left side.  Psychiatric:         Speech: Speech normal.         Neurologic Exam     Mental Status   Oriented to person, place, and time.   Speech: speech is normal   Level of consciousness: alert    Cranial Nerves     CN III, IV, VI   Pupils are equal, round, and reactive to light.  Right pupil: Size: 3 mm. Shape: regular. Reactivity: brisk.   Left pupil: Size: 3 mm. Shape: regular. Reactivity: brisk.   Nystagmus: none     CN XI   CN XI normal.     Motor Exam   Muscle bulk: normal  Overall muscle tone: normal  Right arm tone: normal  Left arm tone: normal  Right arm pronator drift: absent  Left arm pronator drift: absent  Right leg tone: normal  Left leg tone: normal    Sensory Exam   Light touch normal.     Gait, Coordination, and Reflexes     Coordination   Finger to nose coordination: normal    Reflexes   Right brachioradialis: 2+  Left brachioradialis: 2+  Right biceps: 2+  Left biceps: 2+  Right triceps: 2+  Left triceps: 2+  Right patellar: 2+  Left patellar: 2+  Right achilles: 2+  Left achilles: 2+  Right : 2+  Left : 2+  Right Chadwick: absent  Left Chadwick: absent  Right ankle clonus: absent  Left ankle clonus: absent        MEDICAL DECISION MAKING    Imaging Studies:     MRI brain wo contrast    Result Date: 8/20/2024  Narrative: MRI BRAIN WITHOUT CONTRAST INDICATION: stroke. COMPARISON:   None. TECHNIQUE:  Multiplanar, multisequence imaging of the brain was performed. IMAGE QUALITY:  Diagnostic. FINDINGS: BRAIN PARENCHYMA:  There is no discrete mass effect or midline shift. There is no intracranial hemorrhage.  There is no evidence of acute infarction and diffusion imaging is unremarkable.  There are no white matter changes in the cerebral hemispheres. There is a  small nodular extra-axial lesion along the right parietal convexity on series 8, image 21. This measures 8 mm and is suspicious for the presence of a small meningioma in the absence of any known primary malignancy. No corresponding calcification in this region on prior CT. VENTRICLES:  Normal for the patient's age. SELLA AND PITUITARY GLAND:  Normal. ORBITS:  Normal. PARANASAL SINUSES:  Normal. VASCULATURE:  Evaluation of the major intracranial vasculature demonstrates appropriate flow voids. CALVARIUM AND SKULL BASE:  Normal. EXTRACRANIAL SOFT TISSUES:  Normal.     Impression: No mass effect, acute intracranial hemorrhage or evidence of recent infarction. Incidentally noted 8 mm nodular extra-axial lesion along the right parietal convexity may represent a meningioma in the absence of any known primary malignancy. Postcontrast imaging recommended for further evaluation. Workstation performed: [id number]     CT head without contrast    Result Date: 8/19/2024  Narrative: CT BRAIN - WITHOUT CONTRAST INDICATION:   headache, dizziness x 5 days. COMPARISON:  None. TECHNIQUE:  CT examination of the brain was performed.  Multiplanar 2D reformatted images were created from the source data. Radiation dose length product (DLP) for this visit:  946 mGy-cm .  This examination, like all CT scans performed in the [facility], was performed utilizing techniques to minimize radiation dose exposure, including the use of iterative reconstruction and automated exposure control. IMAGE QUALITY:  Diagnostic. FINDINGS: PARENCHYMA:  No intracranial mass, mass effect or midline shift. No CT signs of acute infarction.  No acute parenchymal hemorrhage. VENTRICLES AND EXTRA-AXIAL SPACES:  Normal for the patient's age. VISUALIZED ORBITS: Normal visualized orbits. PARANASAL SINUSES: Normal visualized paranasal sinuses. CALVARIUM AND EXTRACRANIAL SOFT TISSUES:  Normal.     Impression: No acute intracranial abnormality.  Workstation performed: [id number]       I have personally reviewed pertinent reports.   and I have personally reviewed pertinent films in PACS

## 2024-08-28 NOTE — LETTER
August 28, 2024     Lynsey Salazar PA-C  [address] [place] [id number]    Patient: Rosette Irwin   YOB: 1955   Date of Visit: 8/28/2024       Dear Dr. Salazar:    Thank you for referring Rosette Irwin to me for evaluation. Below are my notes for this consultation.    If you have questions, please do not hesitate to call me. I look forward to following your patient along with you.         Sincerely,        Daron Jefferson PA-C        CC: No Recipients    Daron Jefferson PA-C  8/28/2024  6:25 PM  Incomplete  Neurosurgery Office Note  Rosette Irwin 69 y.o. female MRN: [id number]      Assessment & Plan     Patient is a 69 yrs old pleasant woman with PMHx of Hypertensive urgency,Gastroparesis, GERD, Hypothyroidism,Hyperlipidemia, Vertigo and dizziness referred by IM for abnormal brain MRI. Patient reported She had vertigo and then recurring dizziness. Brain images  work up demonstrates an 8 mm right parietal convexity meningioma. MRI brain was done without contrast. Patient was started on Meclizine, and reports no improvement. She denies headache, seizure, vision issues, weakness in the extremities, or B/B dysfunction.     Patient denies Hx of DM, CHF, stroke, seizures, bleeding disorder except baby ASA. No hx of Smoking cigarettes, no Hx of Alcohol ingestion.    Exam-A&OX3, PERRL, EOMI 3 mm conj bilaterally. SF. Michele. Finger to nose test normal and without drift bilaterally. Strength 5/5 bilaterally and sensation to LT intact bilaterally. DTR 2+, no clonus bilaterally.    Hx, Exam, and images reviewed with the patient. Mx plan discussed. I would recommend MRI of brain w/wo contrast. CTA to evaluate any vascular abnormality. Advised to continue follow up with ENT. F/U after images results. Questions and concerns were answered to patient's satisfaction. Patient Verbalized understandings and agreed with the Plan.    Plan:  MRI of brain w/wo contrast  CTA head & Neck w/wo contrast  F/U after images  "results  Call office with any new neurological symptoms.  Call with questions or cocnerns        Chief Complaint   Patient presents with   • Consult     NEWPT MENINGIOMA MRI Brain 8/20/24 SL       HISTORY    History of Present Illness     C/C; \"69 y.o. year old female here referred by IM for abnormal Brain MRI\"    HPI    See detailed discussion above.    REVIEW OF SYSTEMS    Review of Systems   Constitutional: Negative.    HENT:  Positive for trouble swallowing (left ear - patient reports she hears heart pulsating -- intensified ringing in BL ears but right is worse).    Eyes:  Positive for visual disturbance.        Right eye blurrier, \"feels\" right eye   Respiratory: Negative.     Cardiovascular: Negative.    Gastrointestinal:  Positive for nausea (slight nausea).   Endocrine: Negative.    Genitourinary: Negative.    Musculoskeletal:  Positive for gait problem (unsteadiness).   Skin: Negative.    Allergic/Immunologic: Negative.    Neurological:  Positive for dizziness (w/ rapid movement) and headaches (dull headaches; constant since hospital discharge.).   Hematological: Negative.        ROS obtained by MA. Reviewed. See HPI.     Meds/Allergies     Current Outpatient Medications   Medication Sig Dispense Refill   • amLODIPine (NORVASC) 5 mg tablet Take 1 tablet (5 mg total) by mouth daily 30 tablet 0   • DIGESTIVE ENZYMES PO Take by mouth     • famotidine (PEPCID) 20 mg tablet      • levothyroxine 75 mcg tablet Take 75 mcg by mouth daily     • meclizine (ANTIVERT) 25 mg tablet Take 1 tablet (25 mg total) by mouth every 8 (eight) hours Until resolution of symptoms 30 tablet 0   • Multiple Vitamin (multivitamin) tablet Take 1 tablet by mouth daily     • rosuvastatin (CRESTOR) 5 mg tablet Take 5 mg by mouth daily     • VITAMIN D PO Take by mouth       No current facility-administered medications for this visit.       Allergies   Allergen Reactions   • Atorvastatin Myalgia   • Sulfa Antibiotics Nausea Only   • Medical " "Tape Rash       PAST HISTORY    Past Medical History:   Diagnosis Date   • Colon polyp    • Disease of thyroid gland    • GERD (gastroesophageal reflux disease)    • Hypertension    • Vertigo        Past Surgical History:   Procedure Laterality Date   • COLONOSCOPY     • GALLBLADDER SURGERY     • OOPHORECTOMY     • UPPER GASTROINTESTINAL ENDOSCOPY         Social History     Tobacco Use   • Smoking status: Never   • Smokeless tobacco: Never   Vaping Use   • Vaping status: Never Used   Substance Use Topics   • Alcohol use: Not Currently   • Drug use: Never       Family History   Problem Relation Age of Onset   • Prostate cancer Father    • Breast cancer Paternal Grandmother    • Breast cancer Paternal Aunt    • Breast cancer Cousin    • Lymphoma Cousin    • Breast cancer Cousin    • Cancer Cousin    • Stomach cancer Cousin    • Squamous cell carcinoma Daughter          Above history personally reviewed.       EXAM    Vitals:Blood pressure 136/78, pulse 77, temperature (!) 97 °F (36.1 °C), temperature source Temporal, height 5' 2\" (1.575 m), weight 69.9 kg (154 lb), SpO2 99%.,Body mass index is 28.17 kg/m².     Physical Exam  Constitutional:       Appearance: Normal appearance.   HENT:      Head: Normocephalic and atraumatic.   Eyes:      Extraocular Movements: Extraocular movements intact.      Pupils: Pupils are equal, round, and reactive to light.   Pulmonary:      Effort: Pulmonary effort is normal.   Musculoskeletal:         General: Normal range of motion.      Cervical back: Normal range of motion.   Neurological:      General: No focal deficit present.      Mental Status: She is alert and oriented to person, place, and time.      Coordination: Finger-Nose-Finger Test normal.      Deep Tendon Reflexes:      Reflex Scores:       Tricep reflexes are 2+ on the right side and 2+ on the left side.       Bicep reflexes are 2+ on the right side and 2+ on the left side.       Brachioradialis reflexes are 2+ on the right " side and 2+ on the left side.       Patellar reflexes are 2+ on the right side and 2+ on the left side.       Achilles reflexes are 2+ on the right side and 2+ on the left side.  Psychiatric:         Speech: Speech normal.         Neurologic Exam     Mental Status   Oriented to person, place, and time.   Speech: speech is normal   Level of consciousness: alert    Cranial Nerves     CN III, IV, VI   Pupils are equal, round, and reactive to light.  Right pupil: Size: 3 mm. Shape: regular. Reactivity: brisk.   Left pupil: Size: 3 mm. Shape: regular. Reactivity: brisk.   Nystagmus: none     CN XI   CN XI normal.     Motor Exam   Muscle bulk: normal  Overall muscle tone: normal  Right arm tone: normal  Left arm tone: normal  Right arm pronator drift: absent  Left arm pronator drift: absent  Right leg tone: normal  Left leg tone: normal    Sensory Exam   Light touch normal.     Gait, Coordination, and Reflexes     Coordination   Finger to nose coordination: normal    Reflexes   Right brachioradialis: 2+  Left brachioradialis: 2+  Right biceps: 2+  Left biceps: 2+  Right triceps: 2+  Left triceps: 2+  Right patellar: 2+  Left patellar: 2+  Right achilles: 2+  Left achilles: 2+  Right : 2+  Left : 2+  Right Chadwick: absent  Left Chadwick: absent  Right ankle clonus: absent  Left ankle clonus: absent        MEDICAL DECISION MAKING    Imaging Studies:     MRI brain wo contrast    Result Date: 8/20/2024  Narrative: MRI BRAIN WITHOUT CONTRAST INDICATION: stroke. COMPARISON:   None. TECHNIQUE:  Multiplanar, multisequence imaging of the brain was performed. IMAGE QUALITY:  Diagnostic. FINDINGS: BRAIN PARENCHYMA:  There is no discrete mass effect or midline shift. There is no intracranial hemorrhage.  There is no evidence of acute infarction and diffusion imaging is unremarkable.  There are no white matter changes in the cerebral hemispheres. There is a small nodular extra-axial lesion along the right parietal convexity on  series 8, image 21. This measures 8 mm and is suspicious for the presence of a small meningioma in the absence of any known primary malignancy. No corresponding calcification in this region on prior CT. VENTRICLES:  Normal for the patient's age. SELLA AND PITUITARY GLAND:  Normal. ORBITS:  Normal. PARANASAL SINUSES:  Normal. VASCULATURE:  Evaluation of the major intracranial vasculature demonstrates appropriate flow voids. CALVARIUM AND SKULL BASE:  Normal. EXTRACRANIAL SOFT TISSUES:  Normal.     Impression: No mass effect, acute intracranial hemorrhage or evidence of recent infarction. Incidentally noted 8 mm nodular extra-axial lesion along the right parietal convexity may represent a meningioma in the absence of any known primary malignancy. Postcontrast imaging recommended for further evaluation. Workstation performed: [id number]     CT head without contrast    Result Date: 8/19/2024  Narrative: CT BRAIN - WITHOUT CONTRAST INDICATION:   headache, dizziness x 5 days. COMPARISON:  None. TECHNIQUE:  CT examination of the brain was performed.  Multiplanar 2D reformatted images were created from the source data. Radiation dose length product (DLP) for this visit:  946 mGy-cm .  This examination, like all CT scans performed in the [facility], was performed utilizing techniques to minimize radiation dose exposure, including the use of iterative reconstruction and automated exposure control. IMAGE QUALITY:  Diagnostic. FINDINGS: PARENCHYMA:  No intracranial mass, mass effect or midline shift. No CT signs of acute infarction.  No acute parenchymal hemorrhage. VENTRICLES AND EXTRA-AXIAL SPACES:  Normal for the patient's age. VISUALIZED ORBITS: Normal visualized orbits. PARANASAL SINUSES: Normal visualized paranasal sinuses. CALVARIUM AND EXTRACRANIAL SOFT TISSUES:  Normal.     Impression: No acute intracranial abnormality. Workstation performed: [id number]       I have personally reviewed pertinent  "reports.   and I have personally reviewed pertinent films in PACS    Daron Jefferson PA-C  8/28/2024  6:05 PM  Sign when Signing Visit  Neurosurgery Office Note  Rosette Irwin 69 y.o. female MRN: [id number]      Assessment & Plan    Plan:  MRI of brain w/wo contrast  CTA head & Neck w/wo contrast  F/U after images results  Call office with any new neurological symptoms.  Call with questions or cocnerns        Chief Complaint   Patient presents with   • Consult     NEWPT MENINGIOMA MRI Brain 8/20/24        HISTORY    History of Present Illness    69 y.o. year old female     HPI    See Discussion    REVIEW OF SYSTEMS    Review of Systems   Constitutional: Negative.    HENT:  Positive for trouble swallowing (left ear - patient reports she hears heart pulsating -- intensified ringing in BL ears but right is worse).    Eyes:  Positive for visual disturbance.        Right eye blurrier, \"feels\" right eye   Respiratory: Negative.     Cardiovascular: Negative.    Gastrointestinal:  Positive for nausea (slight nausea).   Endocrine: Negative.    Genitourinary: Negative.    Musculoskeletal:  Positive for gait problem (unsteadiness).   Skin: Negative.    Allergic/Immunologic: Negative.    Neurological:  Positive for dizziness (w/ rapid movement) and headaches (dull headaches; constant since hospital discharge.).   Hematological: Negative.        ROS obtained by MA. Reviewed. See HPI.     Meds/Allergies    Current Outpatient Medications   Medication Sig Dispense Refill   • amLODIPine (NORVASC) 5 mg tablet Take 1 tablet (5 mg total) by mouth daily 30 tablet 0   • DIGESTIVE ENZYMES PO Take by mouth     • famotidine (PEPCID) 20 mg tablet      • levothyroxine 75 mcg tablet Take 75 mcg by mouth daily     • meclizine (ANTIVERT) 25 mg tablet Take 1 tablet (25 mg total) by mouth every 8 (eight) hours Until resolution of symptoms 30 tablet 0   • Multiple Vitamin (multivitamin) tablet Take 1 tablet by mouth daily     • rosuvastatin " "(CRESTOR) 5 mg tablet Take 5 mg by mouth daily     • VITAMIN D PO Take by mouth       No current facility-administered medications for this visit.       Allergies   Allergen Reactions   • Atorvastatin Myalgia   • Sulfa Antibiotics Nausea Only   • Medical Tape Rash       PAST HISTORY    Past Medical History:   Diagnosis Date   • Colon polyp    • Disease of thyroid gland    • GERD (gastroesophageal reflux disease)    • Hypertension    • Vertigo        Past Surgical History:   Procedure Laterality Date   • COLONOSCOPY     • GALLBLADDER SURGERY     • OOPHORECTOMY     • UPPER GASTROINTESTINAL ENDOSCOPY         Social History     Tobacco Use   • Smoking status: Never   • Smokeless tobacco: Never   Vaping Use   • Vaping status: Never Used   Substance Use Topics   • Alcohol use: Not Currently   • Drug use: Never       Family History   Problem Relation Age of Onset   • Prostate cancer Father    • Breast cancer Paternal Grandmother    • Breast cancer Paternal Aunt    • Breast cancer Cousin    • Lymphoma Cousin    • Breast cancer Cousin    • Cancer Cousin    • Stomach cancer Cousin    • Squamous cell carcinoma Daughter          Above history personally reviewed.       EXAM    Vitals:Blood pressure 136/78, pulse 77, temperature (!) 97 °F (36.1 °C), temperature source Temporal, height 5' 2\" (1.575 m), weight 69.9 kg (154 lb), SpO2 99%.,Body mass index is 28.17 kg/m².     Physical Exam    Neurologic Exam      MEDICAL DECISION MAKING    Imaging Studies:     MRI brain wo contrast    Result Date: 8/20/2024  Narrative: MRI BRAIN WITHOUT CONTRAST INDICATION: stroke. COMPARISON:   None. TECHNIQUE:  Multiplanar, multisequence imaging of the brain was performed. IMAGE QUALITY:  Diagnostic. FINDINGS: BRAIN PARENCHYMA:  There is no discrete mass effect or midline shift. There is no intracranial hemorrhage.  There is no evidence of acute infarction and diffusion imaging is unremarkable.  There are no white matter changes in the cerebral " hemispheres. There is a small nodular extra-axial lesion along the right parietal convexity on series 8, image 21. This measures 8 mm and is suspicious for the presence of a small meningioma in the absence of any known primary malignancy. No corresponding calcification in this region on prior CT. VENTRICLES:  Normal for the patient's age. SELLA AND PITUITARY GLAND:  Normal. ORBITS:  Normal. PARANASAL SINUSES:  Normal. VASCULATURE:  Evaluation of the major intracranial vasculature demonstrates appropriate flow voids. CALVARIUM AND SKULL BASE:  Normal. EXTRACRANIAL SOFT TISSUES:  Normal.     Impression: No mass effect, acute intracranial hemorrhage or evidence of recent infarction. Incidentally noted 8 mm nodular extra-axial lesion along the right parietal convexity may represent a meningioma in the absence of any known primary malignancy. Postcontrast imaging recommended for further evaluation. Workstation performed: [id number]     CT head without contrast    Result Date: 8/19/2024  Narrative: CT BRAIN - WITHOUT CONTRAST INDICATION:   headache, dizziness x 5 days. COMPARISON:  None. TECHNIQUE:  CT examination of the brain was performed.  Multiplanar 2D reformatted images were created from the source data. Radiation dose length product (DLP) for this visit:  946 mGy-cm .  This examination, like all CT scans performed in the [facility], was performed utilizing techniques to minimize radiation dose exposure, including the use of iterative reconstruction and automated exposure control. IMAGE QUALITY:  Diagnostic. FINDINGS: PARENCHYMA:  No intracranial mass, mass effect or midline shift. No CT signs of acute infarction.  No acute parenchymal hemorrhage. VENTRICLES AND EXTRA-AXIAL SPACES:  Normal for the patient's age. VISUALIZED ORBITS: Normal visualized orbits. PARANASAL SINUSES: Normal visualized paranasal sinuses. CALVARIUM AND EXTRACRANIAL SOFT TISSUES:  Normal.     Impression: No acute intracranial  abnormality. Workstation performed: [id number]

## 2024-08-28 NOTE — PROGRESS NOTES
"Neurosurgery Office Note  Rosette Irwin 69 y.o. female MRN: [id number]      Assessment & Plan     Patient is a 69 yrs old pleasant woman with PMHx of Hypertensive urgency,Gastroparesis, GERD, Hypothyroidism,Hyperlipidemia, Vertigo and dizziness referred by IM for abnormal brain MRI. Patient reported She had vertigo about 1-2 months ago and then continuous dizziness, sometimes associated with nausea. Brain image demonstrates an 8 mm right parietal convexity meningioma. MRI brain was done without contrast. Patient was started on Meclizine, and reports no improvement. She denies headache, seizure, vision issues, weakness in the extremities, or B/B dysfunction.     Patient denies Hx of DM, CHF, stroke, seizures, bleeding disorder except baby ASA. No hx of Smoking cigarettes, no Hx of Alcohol ingestion.    Exam-A&OX3, PERRL, EOMI 3 mm conj bilaterally. SF. Michele. Finger to nose test normal and without drift bilaterally. Strength 5/5 bilaterally and sensation to LT intact bilaterally. DTR 2+, no clonus bilaterally.    Hx, Exam, and images reviewed with the patient. Mx plan discussed. I would recommend MRI of brain w/wo contrast. CTA to evaluate any vascular abnormality. Advised to continue follow up with ENT. F/U after images results. Questions and concerns were answered to patient's satisfaction. Patient Verbalized understandings and agreed with the Plan.    Plan:  MRI of brain w/wo contrast  CTA head & Neck w/wo contrast  F/U after images results  Call office with any new neurological symptoms.  Call with questions or cocnerns        Chief Complaint   Patient presents with    Consult     NEWPT MENINGIOMA MRI Brain 8/20/24        HISTORY    History of Present Illness     C/C; \"69 y.o. year old female here referred by IM for abnormal Brain MRI\"    HPI    See detailed discussion above.    REVIEW OF SYSTEMS    Review of Systems   Constitutional: Negative.    HENT:  Positive for trouble swallowing (left ear - patient " "reports she hears heart pulsating -- intensified ringing in BL ears but right is worse).    Eyes:  Positive for visual disturbance.        Right eye blurrier, \"feels\" right eye   Respiratory: Negative.     Cardiovascular: Negative.    Gastrointestinal:  Positive for nausea (slight nausea).   Endocrine: Negative.    Genitourinary: Negative.    Musculoskeletal:  Positive for gait problem (unsteadiness).   Skin: Negative.    Allergic/Immunologic: Negative.    Neurological:  Positive for dizziness (w/ rapid movement) and headaches (dull headaches; constant since hospital discharge.).   Hematological: Negative.        ROS obtained by MA. Reviewed. See HPI.     Meds/Allergies     Current Outpatient Medications   Medication Sig Dispense Refill    amLODIPine (NORVASC) 5 mg tablet Take 1 tablet (5 mg total) by mouth daily 30 tablet 0    DIGESTIVE ENZYMES PO Take by mouth      famotidine (PEPCID) 20 mg tablet       levothyroxine 75 mcg tablet Take 75 mcg by mouth daily      meclizine (ANTIVERT) 25 mg tablet Take 1 tablet (25 mg total) by mouth every 8 (eight) hours Until resolution of symptoms 30 tablet 0    Multiple Vitamin (multivitamin) tablet Take 1 tablet by mouth daily      rosuvastatin (CRESTOR) 5 mg tablet Take 5 mg by mouth daily      VITAMIN D PO Take by mouth       No current facility-administered medications for this visit.       Allergies   Allergen Reactions    Atorvastatin Myalgia    Sulfa Antibiotics Nausea Only    Medical Tape Rash       PAST HISTORY    Past Medical History:   Diagnosis Date    Colon polyp     Disease of thyroid gland     GERD (gastroesophageal reflux disease)     Hypertension     Vertigo        Past Surgical History:   Procedure Laterality Date    COLONOSCOPY      GALLBLADDER SURGERY      OOPHORECTOMY      UPPER GASTROINTESTINAL ENDOSCOPY         Social History     Tobacco Use    Smoking status: Never    Smokeless tobacco: Never   Vaping Use    Vaping status: Never Used   Substance Use " "Topics    Alcohol use: Not Currently    Drug use: Never       Family History   Problem Relation Age of Onset    Prostate cancer Father     Breast cancer Paternal Grandmother     Breast cancer Paternal Aunt     Breast cancer Cousin     Lymphoma Cousin     Breast cancer Cousin     Cancer Cousin     Stomach cancer Cousin     Squamous cell carcinoma Daughter          Above history personally reviewed.       EXAM    Vitals:Blood pressure 136/78, pulse 77, temperature (!) 97 °F (36.1 °C), temperature source Temporal, height 5' 2\" (1.575 m), weight 69.9 kg (154 lb), SpO2 99%.,Body mass index is 28.17 kg/m².     Physical Exam  Constitutional:       Appearance: Normal appearance.   HENT:      Head: Normocephalic and atraumatic.   Eyes:      Extraocular Movements: Extraocular movements intact.      Pupils: Pupils are equal, round, and reactive to light.   Pulmonary:      Effort: Pulmonary effort is normal.   Musculoskeletal:         General: Normal range of motion.      Cervical back: Normal range of motion.   Neurological:      General: No focal deficit present.      Mental Status: She is alert and oriented to person, place, and time.      Coordination: Finger-Nose-Finger Test normal.      Deep Tendon Reflexes:      Reflex Scores:       Tricep reflexes are 2+ on the right side and 2+ on the left side.       Bicep reflexes are 2+ on the right side and 2+ on the left side.       Brachioradialis reflexes are 2+ on the right side and 2+ on the left side.       Patellar reflexes are 2+ on the right side and 2+ on the left side.       Achilles reflexes are 2+ on the right side and 2+ on the left side.  Psychiatric:         Speech: Speech normal.         Neurologic Exam     Mental Status   Oriented to person, place, and time.   Speech: speech is normal   Level of consciousness: alert    Cranial Nerves     CN III, IV, VI   Pupils are equal, round, and reactive to light.  Right pupil: Size: 3 mm. Shape: regular. Reactivity: brisk. "   Left pupil: Size: 3 mm. Shape: regular. Reactivity: brisk.   Nystagmus: none     CN XI   CN XI normal.     Motor Exam   Muscle bulk: normal  Overall muscle tone: normal  Right arm tone: normal  Left arm tone: normal  Right arm pronator drift: absent  Left arm pronator drift: absent  Right leg tone: normal  Left leg tone: normal    Sensory Exam   Light touch normal.     Gait, Coordination, and Reflexes     Coordination   Finger to nose coordination: normal    Reflexes   Right brachioradialis: 2+  Left brachioradialis: 2+  Right biceps: 2+  Left biceps: 2+  Right triceps: 2+  Left triceps: 2+  Right patellar: 2+  Left patellar: 2+  Right achilles: 2+  Left achilles: 2+  Right : 2+  Left : 2+  Right Chadwick: absent  Left Chadwick: absent  Right ankle clonus: absent  Left ankle clonus: absent        MEDICAL DECISION MAKING    Imaging Studies:     MRI brain wo contrast    Result Date: 8/20/2024  Narrative: MRI BRAIN WITHOUT CONTRAST INDICATION: stroke. COMPARISON:   None. TECHNIQUE:  Multiplanar, multisequence imaging of the brain was performed. IMAGE QUALITY:  Diagnostic. FINDINGS: BRAIN PARENCHYMA:  There is no discrete mass effect or midline shift. There is no intracranial hemorrhage.  There is no evidence of acute infarction and diffusion imaging is unremarkable.  There are no white matter changes in the cerebral hemispheres. There is a small nodular extra-axial lesion along the right parietal convexity on series 8, image 21. This measures 8 mm and is suspicious for the presence of a small meningioma in the absence of any known primary malignancy. No corresponding calcification in this region on prior CT. VENTRICLES:  Normal for the patient's age. SELLA AND PITUITARY GLAND:  Normal. ORBITS:  Normal. PARANASAL SINUSES:  Normal. VASCULATURE:  Evaluation of the major intracranial vasculature demonstrates appropriate flow voids. CALVARIUM AND SKULL BASE:  Normal. EXTRACRANIAL SOFT TISSUES:  Normal.      Impression: No mass effect, acute intracranial hemorrhage or evidence of recent infarction. Incidentally noted 8 mm nodular extra-axial lesion along the right parietal convexity may represent a meningioma in the absence of any known primary malignancy. Postcontrast imaging recommended for further evaluation. Workstation performed: [id number]     CT head without contrast    Result Date: 8/19/2024  Narrative: CT BRAIN - WITHOUT CONTRAST INDICATION:   headache, dizziness x 5 days. COMPARISON:  None. TECHNIQUE:  CT examination of the brain was performed.  Multiplanar 2D reformatted images were created from the source data. Radiation dose length product (DLP) for this visit:  946 mGy-cm .  This examination, like all CT scans performed in the [facility], was performed utilizing techniques to minimize radiation dose exposure, including the use of iterative reconstruction and automated exposure control. IMAGE QUALITY:  Diagnostic. FINDINGS: PARENCHYMA:  No intracranial mass, mass effect or midline shift. No CT signs of acute infarction.  No acute parenchymal hemorrhage. VENTRICLES AND EXTRA-AXIAL SPACES:  Normal for the patient's age. VISUALIZED ORBITS: Normal visualized orbits. PARANASAL SINUSES: Normal visualized paranasal sinuses. CALVARIUM AND EXTRACRANIAL SOFT TISSUES:  Normal.     Impression: No acute intracranial abnormality. Workstation performed: [id number]       I have personally reviewed pertinent reports.   and I have personally reviewed pertinent films in PACS

## 2024-09-17 ENCOUNTER — HOSPITAL ENCOUNTER (OUTPATIENT)
Dept: CT IMAGING | Facility: HOSPITAL | Age: 69
Discharge: HOME | End: 2024-09-17
Payer: COMMERCIAL

## 2024-09-17 DIAGNOSIS — R42 DIZZINESS: ICD-10-CM

## 2024-09-17 PROCEDURE — 70496 CT ANGIOGRAPHY HEAD: CPT

## 2024-09-17 PROCEDURE — 70498 CT ANGIOGRAPHY NECK: CPT

## 2024-09-17 RX ADMIN — IOHEXOL 85 ML: 350 INJECTION, SOLUTION INTRAVENOUS at 08:34

## 2024-09-18 ENCOUNTER — APPOINTMENT (OUTPATIENT)
Dept: LAB | Facility: CLINIC | Age: 69
End: 2024-09-18
Payer: COMMERCIAL

## 2024-09-18 DIAGNOSIS — D32.9 MENINGIOMA (HCC): ICD-10-CM

## 2024-09-18 DIAGNOSIS — E87.5 HYPERPOTASSEMIA: ICD-10-CM

## 2024-09-18 LAB
ALBUMIN SERPL BCG-MCNC: 4.4 G/DL (ref 3.5–5)
ALP SERPL-CCNC: 42 U/L (ref 34–104)
ALT SERPL W P-5'-P-CCNC: 11 U/L (ref 7–52)
ANION GAP SERPL CALCULATED.3IONS-SCNC: 6 MMOL/L (ref 4–13)
AST SERPL W P-5'-P-CCNC: 13 U/L (ref 13–39)
BILIRUB SERPL-MCNC: 1.01 MG/DL (ref 0.2–1)
BUN SERPL-MCNC: 14 MG/DL (ref 5–25)
CALCIUM SERPL-MCNC: 9.6 MG/DL (ref 8.4–10.2)
CHLORIDE SERPL-SCNC: 103 MMOL/L (ref 96–108)
CO2 SERPL-SCNC: 30 MMOL/L (ref 21–32)
CREAT SERPL-MCNC: 0.79 MG/DL (ref 0.6–1.3)
GFR SERPL CREATININE-BSD FRML MDRD: 76 ML/MIN/1.73SQ M
GLUCOSE SERPL-MCNC: 98 MG/DL (ref 65–140)
POTASSIUM SERPL-SCNC: 4.4 MMOL/L (ref 3.5–5.3)
PROT SERPL-MCNC: 7.3 G/DL (ref 6.4–8.4)
SODIUM SERPL-SCNC: 139 MMOL/L (ref 135–147)

## 2024-09-18 PROCEDURE — 36415 COLL VENOUS BLD VENIPUNCTURE: CPT

## 2024-09-18 PROCEDURE — 80053 COMPREHEN METABOLIC PANEL: CPT

## 2024-09-25 ENCOUNTER — HOSPITAL ENCOUNTER (OUTPATIENT)
Dept: MRI IMAGING | Facility: HOSPITAL | Age: 69
Discharge: HOME | End: 2024-09-25
Payer: COMMERCIAL

## 2024-09-25 DIAGNOSIS — D32.9 MENINGIOMA (HCC): ICD-10-CM

## 2024-09-25 PROCEDURE — A9585 GADOBUTROL INJECTION: HCPCS | Performed by: PHYSICIAN ASSISTANT

## 2024-09-25 PROCEDURE — 70553 MRI BRAIN STEM W/O & W/DYE: CPT

## 2024-09-25 RX ORDER — GADOBUTROL 604.72 MG/ML
7 INJECTION INTRAVENOUS
Status: COMPLETED | OUTPATIENT
Start: 2024-09-25 | End: 2024-09-25

## 2024-09-25 RX ADMIN — GADOBUTROL 7 ML: 604.72 INJECTION INTRAVENOUS at 20:09

## 2024-10-02 ENCOUNTER — TELEPHONE (OUTPATIENT)
Dept: NEUROSURGERY | Facility: CLINIC | Age: 69
End: 2024-10-02

## 2024-10-02 ENCOUNTER — OFFICE VISIT (OUTPATIENT)
Dept: NEUROSURGERY | Facility: CLINIC | Age: 69
End: 2024-10-02
Payer: COMMERCIAL

## 2024-10-02 VITALS
DIASTOLIC BLOOD PRESSURE: 70 MMHG | TEMPERATURE: 98.1 F | HEIGHT: 62 IN | HEART RATE: 67 BPM | WEIGHT: 154 LBS | OXYGEN SATURATION: 97 % | SYSTOLIC BLOOD PRESSURE: 138 MMHG | BODY MASS INDEX: 28.34 KG/M2

## 2024-10-02 DIAGNOSIS — R51.9 HEADACHE: Primary | ICD-10-CM

## 2024-10-02 PROCEDURE — 99213 OFFICE O/P EST LOW 20 MIN: CPT | Performed by: PHYSICIAN ASSISTANT

## 2024-10-02 NOTE — TELEPHONE ENCOUNTER
10/2/24 Patient checked out and was prn referral for neurology was placed for patient to schedule an appointment.

## 2024-10-02 NOTE — PROGRESS NOTES
Ambulatory Visit  Name: Rosette Irwin      : 1955      MRN: [id number]  Encounter Provider: Daron Jefferson PA-C  Encounter Date: 10/2/2024   Encounter department: [facility] NEUROSURGICAL [facility]    Assessment & Plan  Headache  Patient is a 69 yrs old pleasant woman with Hx of Dizziness, vertigo and headache. MRI brain images wo contrast  showed suspected meningioma in the right parietal convexity. She had MRI w/wo and the results turned out negative for Meningioma. Patient had also CTA head and images shows asymmetric small caliber vessel at and beyond P2 segment, unknown clinical significance and etiology, could be developmental.  Patient reports is feeling better, dizziness, or vertigo improved spontaneously.  Has chronic occipital headache and also occasional neck pain help occipital headache worse when she lies down on her back.  Patient takes Tylenol.  Did not see neurology.  No nausea, vomiting, seizures, speech or swallowing problem.  Has occasional back pain and left thigh numbness but she reports she managed to live without does not need any spinal surgery.  No gait issues or bowel/bladder dysfunction.    Exam-alert and oriented x 3.  PERRL.  EOMI x 3 mm conjugate bilaterally.  Moves all extremities.  Finger-to-nose test is normal and without drift bilaterally. strength is 5/5 bilaterally.  Sensation to light intact throughout.  DTR 2+ no clonus bilaterally.  Gait stable.    Hx, Exam and images reviewed with the patient.  Management plan discussed.  From neurosurgery perspective image findings could be developmental and I do not think patient needs any neurosurgical intervention at this time.  I would defer to neurology for headache management.  Patient could have occipital neuralgia or pain referred from her neck.  Offered referral to pain management but patient not interested at this time.  Advised to continue taking Tylenol and follow-up with her PCP and neurology.  All questions and  "concerns were answered to patient's satisfaction.  Patient verbalized understanding's and agreed with the plan.    Plan:  Referral to Neurology  Continue F/U with PCP  Offered referral to pain Mx, but patient not interested  From NSX Perspective, no procedure or regular F/U images is required.  F/U on PRN basis.  Call with questions or concerns  Orders:    Ambulatory referral to Neurology; Future      C/C: \" Here to discuss images results\"    History of Present Illness     See detailed discussion above    ROS personally reviewed & updated as follows:  Review of Systems   Constitutional: Negative.    HENT:  Positive for tinnitus (intensified ringing in BL ears but right is worse).    Eyes:  Positive for visual disturbance.        Right eye blurrier, \"feels\" right eye - pt reports blurriness has worsened   Respiratory: Negative.     Cardiovascular: Negative.    Gastrointestinal: Negative.  Nausea: slight nausea.   Endocrine: Negative.    Genitourinary: Negative.    Musculoskeletal: Negative.  Gait problem: unsteadiness.   Skin: Negative.    Allergic/Immunologic: Negative.    Neurological:  Positive for headaches (dull headaches; constant & worst since hospital discharge.). Dizziness: w/ rapid movement.  Hematological: Negative.    Psychiatric/Behavioral: Negative.       I have personally reviewed the MA's review of systems and made changes as necessary.      Objective     /70 (BP Location: Right arm, Patient Position: Sitting, Cuff Size: Adult)   Pulse 67   Temp 98.1 °F (36.7 °C) (Temporal)   Ht 5' 2\" (1.575 m)   Wt 69.9 kg (154 lb)   SpO2 97%   BMI 28.17 kg/m²     Physical Exam  Constitutional:       Appearance: Normal appearance.   HENT:      Head: Normocephalic and atraumatic.   Eyes:      Extraocular Movements: Extraocular movements intact.      Pupils: Pupils are equal, round, and reactive to light.   Pulmonary:      Effort: Pulmonary effort is normal.   Musculoskeletal:         General: Normal range " of motion.      Cervical back: Normal range of motion.   Neurological:      General: No focal deficit present.      Mental Status: She is alert and oriented to person, place, and time.      Coordination: Finger-Nose-Finger Test normal.      Deep Tendon Reflexes:      Reflex Scores:       Tricep reflexes are 2+ on the right side and 2+ on the left side.       Bicep reflexes are 2+ on the right side and 2+ on the left side.       Brachioradialis reflexes are 2+ on the right side and 2+ on the left side.       Patellar reflexes are 2+ on the right side and 2+ on the left side.       Achilles reflexes are 2+ on the right side and 2+ on the left side.  Psychiatric:         Speech: Speech normal.       Neurologic Exam     Mental Status   Oriented to person, place, and time.   Speech: speech is normal   Level of consciousness: alert    Cranial Nerves     CN III, IV, VI   Pupils are equal, round, and reactive to light.  Nystagmus: none     CN XI   CN XI normal.     Motor Exam   Muscle bulk: normal  Overall muscle tone: normal  Right arm tone: normal  Left arm tone: normal  Right arm pronator drift: absent  Left arm pronator drift: absent  Right leg tone: normal  Left leg tone: normal    Sensory Exam   Light touch normal.     Gait, Coordination, and Reflexes     Coordination   Finger to nose coordination: normal    Reflexes   Right brachioradialis: 2+  Left brachioradialis: 2+  Right biceps: 2+  Left biceps: 2+  Right triceps: 2+  Left triceps: 2+  Right patellar: 2+  Left patellar: 2+  Right achilles: 2+  Left achilles: 2+  Right : 2+  Left : 2+  Right Chadwick: absent  Left Chadwick: absent  Right ankle clonus: absent  Left ankle clonus: absent      I personally reviewed the following image studies in PACS and associated radiology reports:   and MRI brain. My interpretation of the radiology images/reports is: findings as described in the assessment section above.    Administrative Statements   I have spent a total  time of 30 minutes in caring for this patient on the day of the visit/encounter including Diagnostic results, Prognosis, Risks and benefits of tx options, Instructions for management, Patient and family education, Importance of tx compliance, Risk factor reductions, Impressions, Counseling / Coordination of care, Documenting in the medical record, Reviewing / ordering tests, medicine, procedures  , and Obtaining or reviewing history  .

## 2024-11-11 ENCOUNTER — OFFICE VISIT (OUTPATIENT)
Dept: GASTROENTEROLOGY | Facility: AMBULARY SURGERY CENTER | Age: 69
End: 2024-11-11
Payer: COMMERCIAL

## 2024-11-11 VITALS
SYSTOLIC BLOOD PRESSURE: 134 MMHG | DIASTOLIC BLOOD PRESSURE: 74 MMHG | HEIGHT: 62 IN | BODY MASS INDEX: 28.85 KG/M2 | OXYGEN SATURATION: 98 % | WEIGHT: 156.8 LBS | HEART RATE: 76 BPM

## 2024-11-11 DIAGNOSIS — K31.84 GASTROPARESIS: Primary | ICD-10-CM

## 2024-11-11 DIAGNOSIS — R14.0 ABDOMINAL BLOATING: ICD-10-CM

## 2024-11-11 DIAGNOSIS — K21.9 GASTROESOPHAGEAL REFLUX DISEASE WITHOUT ESOPHAGITIS: ICD-10-CM

## 2024-11-11 DIAGNOSIS — K59.09 OTHER CONSTIPATION: ICD-10-CM

## 2024-11-11 PROCEDURE — G2211 COMPLEX E/M VISIT ADD ON: HCPCS | Performed by: PHYSICIAN ASSISTANT

## 2024-11-11 PROCEDURE — 99214 OFFICE O/P EST MOD 30 MIN: CPT | Performed by: PHYSICIAN ASSISTANT

## 2024-11-11 NOTE — PROGRESS NOTES
Ambulatory Visit  Name: Rosette Irwin      : 1955      MRN: [id number]  Encounter Provider: Guerline Hudson PA-C  Encounter Date: 2024   Encounter department: [facility] [place]    Assessment & Plan  Gastroparesis  Well controlled with diet, no nausea or vomiting, does get upper abdominal bloating  -small frequent, low residue meals  -can try gas-x or beano for gas  -avoid gas producing foods       Gastroesophageal reflux disease without esophagitis  -continue pepcid 20 mg BID  -antireflux diet and measures       Abdominal bloating  -see above, likely gastroparesis related       Other constipation  -improved  -miralax as needed        Follow up in 1 year, sooner if needed    History of Present Illness     Rosette Irwin is a 69 y.o. female with a history of GERD, gastroparesis, hypothyroidism, hyperlipidemia, constipation who presents for a yearly follow-up.  Her symptoms have been stable.  She has a history of gastroparesis but controlled as well with her diet.  She denies any nausea or vomiting but does get upper abdominal bloating.  She takes digestive enzymes as well as Gas-X and Beano as needed.  Her constipation is relatively well-controlled.  She used to use MiraLAX as needed however recently reports that she is applying castor oil to her umbilicus which helps with her constipation.  Denies any blood in the stool or black tarry stools.  Her acid reflux is pretty well-controlled on Pepcid twice daily.  She denies any unexplained weight loss.  Her last colonoscopy was in  and was unremarkable and was recommended for repeat in 10 years. She was recently in the hospital for being dizzy and off balance       Review of Systems   Constitutional:  Negative for activity change, appetite change, fever and unexpected weight change.   HENT:  Negative for drooling, mouth sores, sore throat, trouble swallowing and voice change.    Eyes:  Negative for pain, discharge and visual  "disturbance.   Respiratory:  Negative for cough, choking, chest tightness and shortness of breath.    Cardiovascular:  Negative for chest pain, palpitations and leg swelling.   Gastrointestinal:  Positive for abdominal distention, abdominal pain and constipation. Negative for anal bleeding, blood in stool, diarrhea and rectal pain.   Genitourinary:  Positive for frequency. Negative for decreased urine volume, difficulty urinating, dysuria, flank pain, hematuria and urgency.   Musculoskeletal:  Negative for arthralgias, back pain, gait problem, myalgias and neck stiffness.   Skin:  Negative for color change, pallor and rash.   Neurological:  Positive for headaches. Negative for dizziness, syncope and light-headedness.   Hematological:  Negative for adenopathy.   Psychiatric/Behavioral:  Negative for confusion. The patient is not nervous/anxious.            Objective     /74 (BP Location: Left arm, Patient Position: Sitting, Cuff Size: Standard)   Pulse 76   Ht 5' 2\" (1.575 m)   Wt 71.1 kg (156 lb 12.8 oz)   SpO2 98%   BMI 28.68 kg/m²     Physical Exam  Constitutional:       General: She is not in acute distress.     Appearance: Normal appearance. She is well-developed.   HENT:      Head: Normocephalic and atraumatic.      Mouth/Throat:      Mouth: Mucous membranes are moist.   Eyes:      General: No scleral icterus.  Neck:      Trachea: No tracheal deviation.   Cardiovascular:      Rate and Rhythm: Normal rate and regular rhythm.      Heart sounds: Normal heart sounds. No murmur heard.  Pulmonary:      Effort: Pulmonary effort is normal. No respiratory distress.      Breath sounds: Normal breath sounds. No wheezing or rales.   Abdominal:      General: Bowel sounds are normal. There is no distension.      Palpations: Abdomen is soft. There is no mass.      Tenderness: There is abdominal tenderness. There is no guarding or rebound.      Comments: Epigastric discomfort    Musculoskeletal:         General: " Normal range of motion.      Cervical back: Normal range of motion and neck supple.   Skin:     General: Skin is warm and dry.      Coloration: Skin is not pale.      Findings: No rash.   Neurological:      Mental Status: She is alert and oriented to person, place, and time. Mental status is at baseline.   Psychiatric:         Mood and Affect: Mood normal.         Behavior: Behavior normal.

## 2024-11-11 NOTE — ASSESSMENT & PLAN NOTE
Well controlled with diet, no nausea or vomiting, does get upper abdominal bloating  -small frequent, low residue meals  -can try gas-x or beano for gas  -avoid gas producing foods

## 2024-12-16 NOTE — PROGRESS NOTES
"Neurology Residency Continuity Clinic Note    Patient ID: Rosette Irwin 69 y.o. female  Format of Visit: In-Person  Nature of Visit: Consultation  Referral Reason: \"Headache\"  Referring Provider: Daron Jefferson PA-C   Primary Care Provider: Vera Mark DO    Assessment/Plan:  Assessment & Plan  Headache  Assessment:  Patient is a 69-year-old female with past medical history of hypothyroid, hyperlipidemia, GERD, hypertension, gastroparesis the presents to the residency neurology clinic for evaluation of headache.  Patient reported headaches started at age 66, reported bilateral/apical/parietal in location onset, without radiation, character is dull/aching/throbbing, with pain rated as 3/10.  Accompanying symptoms of sonophobia, photophobia, vertigo, tinnitus, ataxia, ptosis.  Onset is gradual.  Patient reports around 5 days/month without headache.  Most headaches are similar nature to another.  Patient reported perhaps lying down making headache worse, and odors and working in front of a computer screen to be precipitating factors.  Patient reported worst time of day to be evening, unsure of seasonal, clustering pattern, and does not a incur functional impairment.  Patient utilizes only acetaminophen as abortive agent, utilizes around twice per week, and denied other preventive/abortive agents.  Per patient has attempted chiropractic use in the past.  Patient denied history of head or neck trauma, head and neck surgery, family history of headaches, I reported that father had \"aneurysm in stomach\".  Patient had an MRI of the brain completed in September 2024 which demonstrated no evidence of meningioma and \"normal MRI of the brain\".  CTA head and neck demonstrated unremarkable CT of the brain, and CT angiography demonstrated here is normal caliber right PCA P1 segment with asymmetric small caliber vessel at and beyond P2 segment. Clinical significance and etiology of the finding is uncertain (could be " "developmental).  Patient will initiate supplementation with riboflavin, magnesium glycinate, education was provided for appropriate fluid intake, maintenance of sleep, stress, diet, and a CT venogram of the head and neck will be completed for further vascular assessment considering pulsatile tinnitus, headache, and dizziness are reported with questionable positional component of headache.     Plan:  - Case discussed with attending neurologist Dr. Crowell  - Please start taking vitamin B2 (Riboflavin) 400mg by mouth once per day in the morning  - Please start taking magnesium glycinate 400mg by mouth once per day at bedtime  - Please ensure adequate fluid intake (aim for 32 ounces of water daily)  - Please call and schedule a CT venogram of the head and neck  - Consider sleep study in the future.  - Continue health maintenance of sleep, stress, diet modifications  - Please call office for any questions or concerns  - Please follow up with neurology in 3 months.    Orders:  •  Ambulatory referral to Neurology  •  CT VENOGRAM HEAD AND NECK; Future      Subjective:  Rosette Irwin is an/a R-handed 69 y.o.female with a PMH of hypothyroid, hyperlipidemia, GERD, hypertension, gastroparesis amongst others that presented to the residency neurology clinic for evaluation of headache. The patient was accompanied by no-one at bedside.     During office visit today, 12/17/2024, the patient reported:  She complains of a headache. She does have a headache at this time.    Description of Headaches:  Location of pain: bilateral, apical, parietal  Radiation of pain?:bilateral, apical, parietal  Character of pain:aching, dull, and throbbing  Severity of pain:3  Accompanying symptoms:sonophobia, photophobia, vertigo, tinnitus, ataxia, ptosis  Prodromal sx?: nausea, sonophobia, photophobia, scotomata, vertigo, tinnitus, neck stiffness, ptosis  Rapidity of onset: gradual  Typical duration of individual headache: \"I always have a dull " "headache\". If treated then 1-hour, if not treated around 24H   Frequency of headaches: On a daily basis. Three years ago there was an episode where the patient feel off balance, nauseous, light sensitivity, nausea, vomiting, went to ED and was found to have gastroparesis. The 2nd episode occurred in August of 2024: this episode was described as \"my head hurts, did not wake up with a headache, went to grocery shopping, started to feel off balance, nauseated, and needed to lay down. The patient did not report a severe headache and reported feeling more of a heaviness. Uncertain if there is a positional component  Are you ever headache free? yes - patient reported 5 days without a headache   Are most headaches similar in presentation? yes  Exacerbating factors:lying down  Typical precipitants: odors, working in front of CRT screen    Temporal Pattern of Headaches:  Started having HA's: Patient started getting headaches three years ago 66. Patient unsure if she had headaches as a child  Worst time of day: evening  Awaken from sleep?: no  Seasonal pattern?: Patient unsure  'Clustering' of HA's over time? no  Overall pattern since problem began: stable  Degree of Functional Impairment: no    Current Use of Meds to Treat HA:  Abortive meds? acetaminophen  Daily use? APAP about 2 per week  Preventive meds?  None  Non-Medical/Alternative treatments for HA: yes - chiropractor (\"he cracked my neck - \"might have been useful\")    Additional Relevant History:  History of head/neck trauma? no  History of head/neck surgery? no  Family h/o headache problems?no  Family history of aneurysms? Father had aneurysm \"in lower stomach\"  Use of meds that might worsen HA's; none  Exposure to carbon monoxide? Not or extended periods of time, the [employer] stove alarm did go off twice in the last 10 years  Substance use: alcohol: none, illicit drugs: never, tobacco: never, caffeine: decaf in the AM (1-2 cups of green tea)    Prior " Evaluation/Treatments:  Have you seen another physician for your headaches? yes - ENT/PCP/Neurosurgery  Prior work-up: MRI brain  Trigger point injections? no  Botox injections? no  Epidural injections? no  Prior PREVENTATIVE medications:  None  Prior ABORTIVE mediations: acetaminophen, potential nurtec sample was given (patient reported feeling better).    The following portions of the patient's history were reviewed and updated as appropriate: allergies, current medications, past family history, past medical history, past social history, past surgical history, and problem list.    Objective:  Vitals:    12/17/24 0951   BP: 120/82   Pulse: 79     Physical Exam  Vitals and nursing note reviewed.   Constitutional:       General: She is not in acute distress.     Appearance: She is not ill-appearing or toxic-appearing.   HENT:      Head: Normocephalic.      Right Ear: Hearing normal.      Left Ear: Hearing normal.      Nose: Nose normal.      Mouth/Throat:      Mouth: Mucous membranes are moist.      Pharynx: Oropharynx is clear. No oropharyngeal exudate.   Eyes:      General:         Right eye: No discharge.         Left eye: No discharge.      Extraocular Movements: Extraocular movements intact.   Cardiovascular:      Rate and Rhythm: Normal rate.   Pulmonary:      Effort: No respiratory distress.   Abdominal:      General: There is no distension.   Musculoskeletal:      Cervical back: Normal range of motion.      Right lower leg: No edema.      Left lower leg: No edema.   Skin:     General: Skin is warm and dry.      Coloration: Skin is not jaundiced or pale.      Findings: No erythema.   Neurological:      Mental Status: She is alert.      Cranial Nerves: Cranial nerve deficit present.      Sensory: No sensory deficit.      Motor: Motor strength is normal.No weakness.      Coordination: Coordination normal.      Deep Tendon Reflexes: Reflexes normal.      Reflex Scores:       Tricep reflexes are 2+ on the right  side and 2+ on the left side.       Bicep reflexes are 2+ on the right side and 2+ on the left side.       Brachioradialis reflexes are 2+ on the right side and 2+ on the left side.       Patellar reflexes are 2+ on the right side and 2+ on the left side.       Achilles reflexes are 2+ on the right side and 2+ on the left side.  Psychiatric:         Mood and Affect: Mood normal.         Speech: Speech normal.         Neurological Exam  Mental Status  Alert. Speech is normal.    Cranial Nerves  CN II: Right visual acuity: Counts fingers. Left visual acuity: Counts fingers. Right normal visual field. Left normal visual field. Right funduscopic exam: not visualized. Left funduscopic exam: not visualized.  CN III, IV, VI: Extraocular movements intact bilaterally. Right ptosis.   Right pupil: 3 mm. Round. Reactive to light.   Left pupil: 3 mm. Round. Reactive to light.  CN V:  Right: Facial sensation is normal.  Left: Facial sensation is normal on the left.  CN VII:  Right: There is no facial weakness.  Left: There is no facial weakness.  CN VIII:  Right: Hearing is normal.  Left: Hearing is normal.  CN IX, X: Palate elevates symmetrically  CN XI:  Right: Sternocleidomastoid strength is normal. Trapezius strength is normal.  Left: Sternocleidomastoid strength is normal. Trapezius strength is normal.  CN XII: Tongue midline without atrophy or fasciculations.    Motor  Normal muscle bulk throughout. No fasciculations present. Normal muscle tone. No abnormal involuntary movements. Strength is 5/5 throughout all four extremities.    Sensory  Light touch is normal in upper and lower extremities.     Reflexes                                            Right                      Left  Brachioradialis                    2+                         2+  Biceps                                 2+                         2+  Triceps                                2+                         2+  Patellar                                 2+                         2+  Achilles                                2+                         2+    Right pathological reflexes: Gerardo's absent. Crossed adductor absent. Ankle clonus absent.  Left pathological reflexes: Gerardo's absent. Crossed adductor absent. Ankle clonus absent.    Coordination  Right: Finger-to-nose normal. Rapid alternating movement normal. Heel-to-shin normal.Left: Finger-to-nose normal. Rapid alternating movement normal. Heel-to-shin normal.    Gait  Casual gait is normal including stance, stride, and arm swing. Tandem gait abnormality:  - mild instability on tandem gait. .      Imaging Studies:  MRI BRAIN [facility], 9/25/24:   IMPRESSION: Normal MRI of the brain. No evidence of meningioma.    [facility], 9/17/2024:   IMPRESSION:  CT Brain: Unremarkable CT of the brain.  CT Angiography:  1.  The major vessels of the [place]. There is normal caliber right PCA P1 segment with asymmetric small caliber vessel at and beyond P2 segment. Clinical significance and etiology of the finding is uncertain (could be developmental).  2.  No stenosis in the cervical carotid or vertebral arteries.  ADDENDUM:  Difficult to visualized subcentimeter right parietal convexity extra-axial lesion described in prior MRI due to CT modality limitation. Follow prior MRI recommendation.    MRI BRAIN WO, 8/20/2024:  IMPRESSION:  No mass effect, acute intracranial hemorrhage or evidence of recent infarction.  Incidentally noted 8 mm nodular extra-axial lesion along the right parietal convexity may represent a meningioma in the absence of any known primary malignancy. Postcontrast imaging recommended for further evaluation.    [facility], 8/19/2024:  IMPRESSION:  No acute intracranial abnormality.    MRI LS WO, 9/6/23:  Impression:  Multilevel spondylosis as outlined, including severe bilateral foraminal narrowing at L5-S1 in association with malalignment.     Lab Studies:  Hemoglobin A1c, 8/20/2024: 5.5  Lipid panel,  8/20/2024: 245, 168, 34, 177  TSH, 6/24/2024: 0.82    ROS:  Review of Systems   Constitutional:  Negative for activity change, diaphoresis, fatigue and fever.   HENT:  Positive for tinnitus. Negative for hearing loss, rhinorrhea, sinus pain, trouble swallowing and voice change.    Eyes:  Negative for photophobia, redness and visual disturbance.   Respiratory:  Negative for choking, shortness of breath and wheezing.    Cardiovascular:  Negative for palpitations.   Gastrointestinal:  Negative for constipation, diarrhea, nausea and vomiting.   Genitourinary:  Negative for dysuria and urgency.   Musculoskeletal:  Negative for gait problem, neck pain and neck stiffness.   Neurological:  Positive for dizziness and headaches. Negative for tremors, seizures, syncope, facial asymmetry, speech difficulty, weakness, light-headedness and numbness.   Psychiatric/Behavioral:  Negative for agitation, confusion, hallucinations and sleep disturbance. The patient is not nervous/anxious and is not hyperactive.        This note was completed in part utilizing Dragon Dictation Software. Grammatical errors, random word insertions, spelling mistakes, and incomplete sentences may be an occasional consequence of this system secondary to software limitations, ambient noise, and hardware issues.  If you have any questions or concerns about the content, text, or information contained within the body of this dictation, please contact the provider for clarification.

## 2024-12-17 ENCOUNTER — CONSULT (OUTPATIENT)
Dept: NEUROLOGY | Facility: CLINIC | Age: 69
End: 2024-12-17
Payer: COMMERCIAL

## 2024-12-17 VITALS
WEIGHT: 151 LBS | SYSTOLIC BLOOD PRESSURE: 120 MMHG | BODY MASS INDEX: 27.79 KG/M2 | HEIGHT: 62 IN | HEART RATE: 79 BPM | DIASTOLIC BLOOD PRESSURE: 82 MMHG

## 2024-12-17 DIAGNOSIS — R51.9 HEADACHE: Primary | ICD-10-CM

## 2024-12-17 PROCEDURE — 99203 OFFICE O/P NEW LOW 30 MIN: CPT | Performed by: PSYCHIATRY & NEUROLOGY

## 2024-12-17 NOTE — ASSESSMENT & PLAN NOTE
"Assessment:  Patient is a 69-year-old female with past medical history of hypothyroid, hyperlipidemia, GERD, hypertension, gastroparesis the presents to the residency neurology clinic for evaluation of headache.  Patient reported headaches started at age 66, reported bilateral/apical/parietal in location onset, without radiation, character is dull/aching/throbbing, with pain rated as 3/10.  Accompanying symptoms of sonophobia, photophobia, vertigo, tinnitus, ataxia, ptosis.  Onset is gradual.  Patient reports around 5 days/month without headache.  Most headaches are similar nature to another.  Patient reported perhaps lying down making headache worse, and odors and working in front of a computer screen to be precipitating factors.  Patient reported worst time of day to be evening, unsure of seasonal, clustering pattern, and does not a incur functional impairment.  Patient utilizes only acetaminophen as abortive agent, utilizes around twice per week, and denied other preventive/abortive agents.  Per patient has attempted chiropractic use in the past.  Patient denied history of head or neck trauma, head and neck surgery, family history of headaches, I reported that father had \"aneurysm in stomach\".  Patient had an MRI of the brain completed in September 2024 which demonstrated no evidence of meningioma and \"normal MRI of the brain\".  CTA head and neck demonstrated unremarkable CT of the brain, and CT angiography demonstrated here is normal caliber right PCA P1 segment with asymmetric small caliber vessel at and beyond P2 segment. Clinical significance and etiology of the finding is uncertain (could be developmental).  Patient will initiate supplementation with riboflavin, magnesium glycinate, education was provided for appropriate fluid intake, maintenance of sleep, stress, diet, and a CT venogram of the head and neck will be completed for further vascular assessment considering pulsatile tinnitus, headache, and " dizziness are reported with questionable positional component of headache.     Plan:  - Case discussed with attending neurologist Dr. Crowell  - Please start taking vitamin B2 (Riboflavin) 400mg by mouth once per day in the morning  - Please start taking magnesium glycinate 400mg by mouth once per day at bedtime  - Please ensure adequate fluid intake (aim for 32 ounces of water daily)  - Please call and schedule a CT venogram of the head and neck  - Consider sleep study in the future.  - Continue health maintenance of sleep, stress, diet modifications  - Please call office for any questions or concerns  - Please follow up with neurology in 3 months.    Orders:  •  Ambulatory referral to Neurology  •  CT VENOGRAM HEAD AND NECK; Future

## 2024-12-17 NOTE — PATIENT INSTRUCTIONS
- Please start taking vitamin B2 (Riboflavin) 400mg by mouth once per day in the morning  - Please start taking magnesium glycinate 400mg by mouth once per day at bedtime  - Please ensure adequate fluid intake (aim for 32 ounces of water daily)  - Please call and schedule a CT venogram of the head and neck  - Continue health maintenance of sleep, stress, diet modifications  - Please call office for any questions or concerns  - Please follow up with neurology in 3 months.

## 2025-02-21 ENCOUNTER — TELEPHONE (OUTPATIENT)
Age: 70
End: 2025-02-21

## 2025-02-21 DIAGNOSIS — R51.9 HEADACHE: Primary | ICD-10-CM

## 2025-02-21 NOTE — TELEPHONE ENCOUNTER
I called and spoke with the patient; advised lab order placed and no need for fasting.  Patient was appreciative for the phone call.

## 2025-02-21 NOTE — TELEPHONE ENCOUNTER
Pt calling in stating she needs to complete a CMP blood work for CT scan coming up on 2/27/25. Asking for Dr Aguilar to place scripts in for her.  Pt would like to know if she also has to fast for this bloodwork.     Please assist, thank you.

## 2025-02-24 ENCOUNTER — APPOINTMENT (OUTPATIENT)
Dept: LAB | Facility: HOSPITAL | Age: 70
End: 2025-02-24
Payer: COMMERCIAL

## 2025-02-24 DIAGNOSIS — R51.9 HEADACHE: ICD-10-CM

## 2025-02-24 LAB
ALBUMIN SERPL BCG-MCNC: 4.5 G/DL (ref 3.5–5)
ALP SERPL-CCNC: 53 U/L (ref 34–104)
ALT SERPL W P-5'-P-CCNC: 11 U/L (ref 7–52)
ANION GAP SERPL CALCULATED.3IONS-SCNC: 7 MMOL/L (ref 4–13)
AST SERPL W P-5'-P-CCNC: 14 U/L (ref 13–39)
BILIRUB SERPL-MCNC: 0.62 MG/DL (ref 0.2–1)
BUN SERPL-MCNC: 15 MG/DL (ref 5–25)
CALCIUM SERPL-MCNC: 9.8 MG/DL (ref 8.4–10.2)
CHLORIDE SERPL-SCNC: 100 MMOL/L (ref 96–108)
CO2 SERPL-SCNC: 30 MMOL/L (ref 21–32)
CREAT SERPL-MCNC: 0.8 MG/DL (ref 0.6–1.3)
GFR SERPL CREATININE-BSD FRML MDRD: 74 ML/MIN/1.73SQ M
GLUCOSE SERPL-MCNC: 88 MG/DL (ref 65–140)
POTASSIUM SERPL-SCNC: 4.1 MMOL/L (ref 3.5–5.3)
PROT SERPL-MCNC: 7.4 G/DL (ref 6.4–8.4)
SODIUM SERPL-SCNC: 137 MMOL/L (ref 135–147)

## 2025-02-24 PROCEDURE — 36415 COLL VENOUS BLD VENIPUNCTURE: CPT

## 2025-02-24 PROCEDURE — 80053 COMPREHEN METABOLIC PANEL: CPT

## 2025-02-27 ENCOUNTER — HOSPITAL ENCOUNTER (OUTPATIENT)
Dept: CT IMAGING | Facility: HOSPITAL | Age: 70
End: 2025-02-27
Payer: COMMERCIAL

## 2025-02-27 DIAGNOSIS — R51.9 HEADACHE: ICD-10-CM

## 2025-02-27 PROCEDURE — 70498 CT ANGIOGRAPHY NECK: CPT

## 2025-02-28 ENCOUNTER — RESULTS FOLLOW-UP (OUTPATIENT)
Dept: NEUROLOGY | Facility: CLINIC | Age: 70
End: 2025-02-28

## 2025-02-28 RX ADMIN — IOHEXOL 75 ML: 350 INJECTION, SOLUTION INTRAVENOUS at 10:05

## 2025-03-05 NOTE — PROGRESS NOTES
Neurology Residency Continuity Clinic Note    Patient ID: Rosette Irwin 69 y.o. female  Format of Visit: In-Person  Nature of Visit: Follow-Up  Primary Care Provider: Vera Mark DO  Last office visit: 12/17/24  Last office visit Provider: Dr. Lauren DO (PGY-IV)  Last office visit Attending: Dr. Socrates MD    Assessment/Plan:  Assessment & Plan  Headache  Assessment:  Patient is a 69-year-old female with past medical history of hypothyroid, hyperlipidemia, hypertension, GERD, gastroparesis, headache that presented to the residency neurology clinic for follow-up in terms of headache.  Since last office visit patient reported that headaches have remained about the same in character, occasionally worsening in severity.  Patient is not had continued episodes of dizziness.  Patient reported that headaches continue to show a pattern where when she lies down to be worsened.  Patient is not had headache free days in the last 30 days.  Patient to continue with magnesium riboflavin supplementation, patient reported that there was a good response with attempting to use Nurtec in the past therefore a trial with rizatriptan will be completed to determine if patient does have good response, which will help guide therapy towards migraine headache.  Patient was counseled to continue following up with otolaryngology and neurosurgery in regards to CTV findings of focal thickening of the right sigmoid plate.  CT venogram demonstrated no dural sinus venous thrombosis.  Patient does have tinnitus and headaches that have predilection for the right-hand side.  Patient otherwise to follow-up with neurology in 6 months.    Plan:  - Case discussed with attending neurologist Dr. Fairbanks  - Please follow up with otolaryngology and neurosurgery to follow up on the CTV findings as discussed in clinic today  - Please continue with magnesium and riboflavin   - Please start rizatriptan (10 mg total) by mouth as needed for migraine Take at the  "onset of migraine; if symptoms continue or return, may take another dose at least 2 hours after first dose. Take no more than 2 doses in a day.  - Please continue to follow up with your PCP and other specialists  - Please call the office for any questions or concerns  - Please follow up with neurology in about 6 months    Orders:  •  rizatriptan (Maxalt) 10 mg tablet; Take 1 tablet (10 mg total) by mouth as needed for migraine Take at the onset of migraine; if symptoms continue or return, may take another dose at least 2 hours after first dose. Take no more than 2 doses in a day.    Subjective:  Rosette Irwin is an/a RIGHT-handed 69 y.o.female with a PMH of hypothyroid, hyperlipidemia, GERD, hypertension, gastroparesis, and headache that presents to the residency neurology clinic for follow-up visit in terms of headache.  Patient was not accompanied by family bedside.     During the last office visit the patient reported:  Patient was last seen in consultation on 12/17/2024 for workup of headache.  Patient reported headaches started at age 66, headaches described bilateral, apical, parietal in location onset, without radiation, character is dull/aching/throbbing, pain rated 3/10.  Accompanying symptoms of sonophobia, photophobia, vertigo, tinnitus, ataxia, ptosis.  Gradual onset.  5 days/month without headache.  Most headaches are similar to one another.  Laying down and odors and working from computer screen may be precipitating factors.  Worst time of day is evening.  MRI brain in September 2024 was reported to be normal MRI of the brain\".  CTA head and neck demonstrated artery small caliber vessel at and beyond P2 suspected to be developmental however uncertain.  Patient was initiated on supplementation of riboflavin, magnesium glycinate, and education provided for appropriate fluid intake, maintenance of sleep, stress, diet, and CT venogram of the head and neck was ordered for further vascular assessment.    - " "CT venogram demonstrated \"focal thickening of the right sigmoid plate, correlate for ipsilateral tinnitus otherwise, unremarkable head CT and CT venogram.\"  - Patient was contacted in regards to above results.  Patient was asked to continue to follow-up with neurosurgery and otolaryngology.    During office visit today, 3/6/2025, the patient reported:  - The patient reported that overall the headaches have remained about similar and reported that when laying down at night to go to bed that the headaches may sometimes get worse where the patient reported that severity being different not the character of the headaches. Overall pain level of pain is between a 3-5/10. The patient would get these headaches about every day. In a 30 day period the patient reported that there have been no headache free days. These headaches do not seem to interfere with her life. The patient does get chiropractic manipulation, cervical manipulation occurs once per week.  - In terms of vertigo the patient reported that she has not have any episodes of vertigo since the initial hospitalized     The following portions of the patient's history were reviewed and updated as appropriate: allergies, current medications, past family history, past medical history, past social history, past surgical history, and problem list.    Objective:  Vitals:    03/06/25 1344   BP: 120/78   Pulse: 74   SpO2: 97%     Physical Exam  Vitals and nursing note reviewed.   Constitutional:       General: She is not in acute distress.     Appearance: She is not ill-appearing or toxic-appearing.   HENT:      Head: Normocephalic.      Right Ear: Hearing normal.      Left Ear: Hearing normal.      Nose: Nose normal.      Mouth/Throat:      Mouth: Mucous membranes are moist.      Pharynx: Oropharynx is clear. No oropharyngeal exudate.   Eyes:      General:         Right eye: No discharge.         Left eye: No discharge.      Extraocular Movements: Extraocular movements " intact.   Cardiovascular:      Rate and Rhythm: Normal rate.   Pulmonary:      Effort: No respiratory distress.   Abdominal:      General: There is no distension.   Musculoskeletal:      Cervical back: Normal range of motion.      Right lower leg: No edema.      Left lower leg: No edema.   Skin:     General: Skin is warm and dry.      Coloration: Skin is not jaundiced or pale.      Findings: No erythema.   Neurological:      Mental Status: She is alert.      Cranial Nerves: Cranial nerve deficit present.      Sensory: No sensory deficit.      Motor: Motor strength is normal.No weakness.      Coordination: Coordination normal.      Deep Tendon Reflexes: Reflexes normal.      Reflex Scores:       Tricep reflexes are 2+ on the right side and 2+ on the left side.       Bicep reflexes are 2+ on the right side and 2+ on the left side.       Brachioradialis reflexes are 2+ on the right side and 2+ on the left side.       Patellar reflexes are 2+ on the right side and 2+ on the left side.       Achilles reflexes are 2+ on the right side and 2+ on the left side.  Psychiatric:         Mood and Affect: Mood normal.         Speech: Speech normal.         Neurological Exam  Mental Status  Alert. Speech is normal.    Cranial Nerves  CN II: Right visual acuity: Counts fingers. Left visual acuity: Counts fingers. Right normal visual field. Left normal visual field. Right funduscopic exam: not visualized. Left funduscopic exam: not visualized.  CN III, IV, VI: Extraocular movements intact bilaterally. Right ptosis.   Right pupil: 3 mm. Round. Reactive to light.   Left pupil: 3 mm. Round. Reactive to light.  CN V:  Right: Facial sensation is normal.  Left: Facial sensation is normal on the left.  CN VII:  Right: There is no facial weakness.  Left: There is no facial weakness.  CN VIII:  Right: Hearing is normal.  Left: Hearing is normal.  CN IX, X: Palate elevates symmetrically  CN XI:  Right: Sternocleidomastoid strength is normal.  Trapezius strength is normal.  Left: Sternocleidomastoid strength is normal. Trapezius strength is normal.  CN XII: Tongue midline without atrophy or fasciculations.    Motor  Normal muscle bulk throughout. No fasciculations present. Normal muscle tone. Strength is 5/5 throughout all four extremities.    Sensory  Light touch is normal in upper and lower extremities.     Reflexes                                            Right                      Left  Brachioradialis                    2+                         2+  Biceps                                 2+                         2+  Triceps                                2+                         2+  Patellar                                2+                         2+  Achilles                                2+                         2+    Right pathological reflexes: Crossed adductor absent. Ankle clonus absent.  Left pathological reflexes: Crossed adductor absent. Ankle clonus absent.    Coordination  Right: Finger-to-nose normal. Rapid alternating movement normal. Heel-to-shin normal.Left: Finger-to-nose normal. Rapid alternating movement normal. Heel-to-shin normal.    Gait  Casual gait is normal including stance, stride, and arm swing.    ROS:  Review of Systems   Constitutional:  Negative for activity change, diaphoresis, fatigue and fever.   HENT:  Positive for tinnitus. Negative for hearing loss, rhinorrhea, sinus pain, trouble swallowing and voice change.    Eyes:  Negative for photophobia, redness and visual disturbance.   Respiratory:  Negative for choking, shortness of breath and wheezing.    Cardiovascular:  Negative for palpitations.   Gastrointestinal:  Negative for constipation, diarrhea, nausea and vomiting.   Genitourinary:  Negative for dysuria and urgency.   Musculoskeletal:  Negative for gait problem, neck pain and neck stiffness.   Neurological:  Positive for headaches. Negative for dizziness, tremors, seizures, syncope, facial  asymmetry, speech difficulty, weakness, light-headedness and numbness.   Psychiatric/Behavioral:  Negative for agitation, confusion, hallucinations and sleep disturbance. The patient is not nervous/anxious and is not hyperactive.      This note was completed in part utilizing Dragon Dictation Software. Grammatical errors, random word insertions, spelling mistakes, and incomplete sentences may be an occasional consequence of this system secondary to software limitations, ambient noise, and hardware issues.  If you have any questions or concerns about the content, text, or information contained within the body of this dictation, please contact the provider for clarification.

## 2025-03-06 ENCOUNTER — OFFICE VISIT (OUTPATIENT)
Dept: NEUROLOGY | Facility: CLINIC | Age: 70
End: 2025-03-06

## 2025-03-06 VITALS
HEIGHT: 62 IN | BODY MASS INDEX: 29.44 KG/M2 | OXYGEN SATURATION: 97 % | WEIGHT: 160 LBS | DIASTOLIC BLOOD PRESSURE: 78 MMHG | HEART RATE: 74 BPM | SYSTOLIC BLOOD PRESSURE: 120 MMHG

## 2025-03-06 DIAGNOSIS — R51.9 HEADACHE: Primary | ICD-10-CM

## 2025-03-06 DIAGNOSIS — R51.9 HEADACHE: ICD-10-CM

## 2025-03-06 RX ORDER — RIZATRIPTAN BENZOATE 10 MG/1
10 TABLET ORAL AS NEEDED
Qty: 18 TABLET | Refills: 0 | Status: SHIPPED | OUTPATIENT
Start: 2025-03-06 | End: 2025-03-07 | Stop reason: SDUPTHER

## 2025-03-06 NOTE — PATIENT INSTRUCTIONS
- Please follow up with otolaryngology and neurosurgery to follow up on the CTV findings as discussed in clinic today  - Please continue with magnesium and riboflavin   - Please start rizatriptan (10 mg total) by mouth as needed for migraine Take at the onset of migraine; if symptoms continue or return, may take another dose at least 2 hours after first dose. Take no more than 2 doses in a day.  - Please continue to follow up with your PCP and other specialists  - Please call the office for any questions or concerns  - Please follow up with neurology in about 6 months

## 2025-03-06 NOTE — ASSESSMENT & PLAN NOTE
Assessment:  Patient is a 69-year-old female with past medical history of hypothyroid, hyperlipidemia, hypertension, GERD, gastroparesis, headache that presented to the residency neurology clinic for follow-up in terms of headache.  Since last office visit patient reported that headaches have remained about the same in character, occasionally worsening in severity.  Patient is not had continued episodes of dizziness.  Patient reported that headaches continue to show a pattern where when she lies down to be worsened.  Patient is not had headache free days in the last 30 days.  Patient to continue with magnesium riboflavin supplementation, patient reported that there was a good response with attempting to use Nurtec in the past therefore a trial with rizatriptan will be completed to determine if patient does have good response, which will help guide therapy towards migraine headache.  Patient was counseled to continue following up with otolaryngology and neurosurgery in regards to CTV findings of focal thickening of the right sigmoid plate.  CT venogram demonstrated no dural sinus venous thrombosis.  Patient does have tinnitus and headaches that have predilection for the right-hand side.  Patient otherwise to follow-up with neurology in 6 months.    Plan:  - Case discussed with attending neurologist Dr. Fairbanks  - Please follow up with otolaryngology and neurosurgery to follow up on the CTV findings as discussed in clinic today  - Please continue with magnesium and riboflavin   - Please start rizatriptan (10 mg total) by mouth as needed for migraine Take at the onset of migraine; if symptoms continue or return, may take another dose at least 2 hours after first dose. Take no more than 2 doses in a day.  - Please continue to follow up with your PCP and other specialists  - Please call the office for any questions or concerns  - Please follow up with neurology in about 6 months    Orders:  •  rizatriptan (Maxalt) 10 mg  tablet; Take 1 tablet (10 mg total) by mouth as needed for migraine Take at the onset of migraine; if symptoms continue or return, may take another dose at least 2 hours after first dose. Take no more than 2 doses in a day.

## 2025-03-07 ENCOUNTER — TELEPHONE (OUTPATIENT)
Dept: NEUROLOGY | Facility: CLINIC | Age: 70
End: 2025-03-07

## 2025-03-07 DIAGNOSIS — R51.9 HEADACHE: Primary | ICD-10-CM

## 2025-03-07 RX ORDER — RIZATRIPTAN BENZOATE 10 MG/1
10 TABLET ORAL AS NEEDED
Qty: 12 TABLET | Refills: 0 | Status: SHIPPED | OUTPATIENT
Start: 2025-03-07

## 2025-03-07 RX ORDER — SUMATRIPTAN 50 MG/1
TABLET, FILM COATED ORAL
Qty: 1 TABLET | Refills: 0 | OUTPATIENT
Start: 2025-03-07

## 2025-03-07 NOTE — TELEPHONE ENCOUNTER
Pharmacy comment: Alternative Requested:THE PRESCRIBED MEDICATION IS NOT COVERED BY INSURANCE. PLEASE CONSIDER CHANGING TO ONE OF THE SUGGESTED COVERED ALTERNATIVES.     Note sent to clinical team for PA

## 2025-03-07 NOTE — TELEPHONE ENCOUNTER
Reason for call:   [x] Prior Auth  [] Other:     Caller:  [] Patient  [x] Pharmacy  Name:   Address:   Callback Number:     Medication:  rizatriptan (Maxalt) 10 mg as needed     Ordering Provider:   [] PCP/Provider -   [x] Speciality/Provider -[facility]      Has the patient tried other medications and failed? If failed, which medications did they fail?    [] No   [] Yes -     Is the patient's insurance updated in EPIC?   [x] Yes   [] No     Is a copy of the patient's insurance scanned in EPIC?   [x] Yes   [] No

## 2025-03-07 NOTE — TELEPHONE ENCOUNTER
Called [facility] pharmacy and left a message of all of the below and for cb if any questions.

## 2025-03-07 NOTE — TELEPHONE ENCOUNTER
Gilberto Aguilar DO to Me   AKI    3/7/25  1:55 PM  Dear Nancy,    Thank you for the note. The updated Rx was sent to the pharmacy.    Dr. Lauren Izquierdo DO  [facility] Neurology Residency - PGY-IV

## 2025-03-07 NOTE — TELEPHONE ENCOUNTER
Liz from [facility] called and states that there is a quantity limit for rizatriptan. Ins will only cover 12 tabs per 30 days. We will receive a denial for 18 tabs per 30 days but 12 tabs per 30 days is covered. Can send new script to the pharmacy.     Rx entered. Pls review and sign off if agreeable

## 2025-04-08 DIAGNOSIS — R51.9 HEADACHE: ICD-10-CM

## 2025-04-08 RX ORDER — RIZATRIPTAN BENZOATE 10 MG/1
10 TABLET ORAL AS NEEDED
Qty: 12 TABLET | Refills: 5 | Status: SHIPPED | OUTPATIENT
Start: 2025-04-08